# Patient Record
Sex: MALE | Race: WHITE | NOT HISPANIC OR LATINO | Employment: OTHER | URBAN - NONMETROPOLITAN AREA
[De-identification: names, ages, dates, MRNs, and addresses within clinical notes are randomized per-mention and may not be internally consistent; named-entity substitution may affect disease eponyms.]

---

## 2017-01-17 ENCOUNTER — APPOINTMENT (OUTPATIENT)
Dept: LAB | Facility: HOSPITAL | Age: 72
End: 2017-01-17
Attending: SURGERY
Payer: COMMERCIAL

## 2017-01-17 ENCOUNTER — TRANSFERRED RECORDS (OUTPATIENT)
Dept: HEALTH INFORMATION MANAGEMENT | Facility: HOSPITAL | Age: 72
End: 2017-01-17

## 2017-01-17 ENCOUNTER — RESULTS ONLY (OUTPATIENT)
Dept: LAB | Age: 72
End: 2017-01-17

## 2017-01-17 PROCEDURE — 88305 TISSUE EXAM BY PATHOLOGIST: CPT | Mod: TC | Performed by: SURGERY

## 2017-01-19 LAB — COPATH REPORT: NORMAL

## 2017-03-13 ENCOUNTER — AMBULATORY - GICH (OUTPATIENT)
Dept: SCHEDULING | Facility: OTHER | Age: 72
End: 2017-03-13

## 2018-01-16 RX ORDER — LISINOPRIL 20 MG/1
TABLET ORAL
Status: ON HOLD | COMMUNITY
End: 2021-09-12

## 2018-01-16 RX ORDER — TIOTROPIUM BROMIDE 18 UG/1
18 CAPSULE ORAL; RESPIRATORY (INHALATION)
COMMUNITY
End: 2021-08-04

## 2018-01-16 RX ORDER — ASPIRIN 81 MG/1
81 TABLET ORAL DAILY
COMMUNITY

## 2018-01-16 RX ORDER — AMLODIPINE BESYLATE 2.5 MG/1
TABLET ORAL
Status: ON HOLD | COMMUNITY
End: 2021-09-12

## 2018-01-16 RX ORDER — HYDROCODONE BITARTRATE AND ACETAMINOPHEN 5; 325 MG/1; MG/1
1 TABLET ORAL
COMMUNITY
Start: 2014-02-26 | End: 2021-08-04

## 2021-04-27 ENCOUNTER — TRANSFERRED RECORDS (OUTPATIENT)
Dept: HEALTH INFORMATION MANAGEMENT | Facility: CLINIC | Age: 76
End: 2021-04-27

## 2021-07-06 ENCOUNTER — TRANSCRIBE ORDERS (OUTPATIENT)
Dept: OTHER | Age: 76
End: 2021-07-06

## 2021-07-06 DIAGNOSIS — J44.9 COPD (CHRONIC OBSTRUCTIVE PULMONARY DISEASE) (H): Primary | ICD-10-CM

## 2021-07-08 NOTE — TELEPHONE ENCOUNTER
RECORDS STATUS - ALL OTHER DIAGNOSIS      RECORDS RECEIVED FROM: [facility]   DATE RECEIVED:    NOTES STATUS DETAILS   OFFICE NOTE from referring provider Self, notes in CE 4/27/21: Dr. Chon Riojas   OFFICE NOTE from medical oncologist     DISCHARGE SUMMARY from hospital     DISCHARGE REPORT from the ER CE - [facility] 2/9/20   Pulmonary Function Test/Spirometry Received 7/9 4/27/21, 8/22/19, 6/27/19,  6/19/18, 5/31/17, 10/27/15: [facility]   OPERATIVE REPORT CE - [facility] 1/13/16: Colonoscopy   MEDICATION LIST [facility]   CLINICAL TRIAL TREATMENTS TO DATE     LABS     PATHOLOGY REPORTS NA    ANYTHING RELATED TO DIAGNOSIS Epic/CE 6/11/21   GENONOMIC TESTING     TYPE:     IMAGING (NEED IMAGES & REPORT)     XR PACS 1/22/21, 6/22/18, 5/25/18: [facility]   CT SCANS PACS 6/11/21, 6/19/20, 6/12/19, 12/18/18: [facility]   MRI     MAMMO     ULTRASOUND     PET

## 2021-08-04 ENCOUNTER — VIRTUAL VISIT (OUTPATIENT)
Dept: PULMONOLOGY | Facility: CLINIC | Age: 76
End: 2021-08-04
Attending: INTERNAL MEDICINE
Payer: COMMERCIAL

## 2021-08-04 ENCOUNTER — PRE VISIT (OUTPATIENT)
Dept: PULMONOLOGY | Facility: CLINIC | Age: 76
End: 2021-08-04

## 2021-08-04 DIAGNOSIS — J43.2 CENTRILOBULAR EMPHYSEMA (H): Primary | ICD-10-CM

## 2021-08-04 PROCEDURE — 99204 OFFICE O/P NEW MOD 45 MIN: CPT | Mod: 95 | Performed by: INTERNAL MEDICINE

## 2021-08-04 NOTE — LETTER
8/4/2021       RE: Brent Villagomez  [address]  [place] [address]     Dear Colleague,    Thank you for referring your patient, Brent Villagomez, to the [facility] at [facility]. Please see a copy of my visit note below.    Brent is a 76 year old who is being evaluated via a billable video visit.      How would you like to obtain your AVS? MyChart  If the video visit is dropped, the invitation should be resent by: Send to e-mail at: [email or web address]  Will anyone else be joining your video visit? No     Stefania Shelley SEAN        Video-Visit Details    Type of service:  Video Visit    Video Time: 15 minutes    Originating Location (pt. Location): Home    Distant Location (provider location):  [facility]     Platform used for Video Visit: [facility]  Interventional Pulmonary Clinic Virtual Visit Note    August 4, 2021    Chief complaint:  Brent Villagomez is a 76 year old male seen for   Chief Complaint   Patient presents with     Video Visit     New pt- Consideration of endobronchial valve procedure for severe emphysema       Reason for clinic visit / Chief complaint:   Severe COPD    Assessment and Plan:  Severe COPD, evaluation for lung volume reduction.  Patient was sent by his pulmonologist for further evaluation of or his candidacy for bronchoscopic lung volume reduction.  He has had COPD at least for 10 to 15 years.  He has been on Trelegy and nebulizer which he uses recently every 4 hours.  He is also on home oxygen treatment as below.  We discussed bronchoscopic lung volume reduction and need for further testing to determine if he is a good candidate or not.  We will 6-minute walk test, comprehensive PFTs and Olympus selected CT chest.  He is in agreement with the plan.  I will see him again after these tests are done to discuss the results.  Former smoker  Hypertension on  medications      Billing: Based on Medical Decision Making (Complexity):  L4    History of Present Illness:  76 years old gentleman with known severe COPD has been on inhalers/nebulizer with limited physical capacity.  He has had PFTs done in April 2021 revealing FEV1 of 1.2 L.  His main symptom is dyspnea on exertion.  He appears to be having exacerbations averaging once a year but he has never been in the hospital or emergency room because of COPD in the past.     HOT 2 lpm/NC sitting, 4 lpm on exertion  trelogy inhaler  Nebs--q4 hours  Prednisone use-- once a year  No ED visit or hospitalization  Smoker 50 pack year, quit 5 years ago    Evaluation Checklist for Bronchoscopic Lung Volume Reduction (Spiration Valves):    Before Referring Your Patient to Interventional Pulmonology:  []Have the studies listed below been completed?  []My patient meets the below mentioned inclusion criteria  []My patient does not meet any exclusion criteria     Checklist of Studies to Complete Within 12 weeks of Initial Evaluation:  []CT scan of the chest (ok if within 6 months)  []Transthoracic echocardiogram   []Arterial blood gas on room air   []6 minute walk test   []Spirometry, lung volumes, and diffusing capacity     Inclusion Criteria Checklist:  []Heterogeneous, upper lobe predominant COPD    []Completion of a comprehensive pulmonary rehabilitation program or able to ambulate >140 meters (459 feet) on 6MWT   []FEV1 ?45% of predicted value  []TLC ?100% of predicted value  []RV ?150% of predicted value  []PaCO2 ?50 mmHg  []PaO2 ?45 mmHg (on room air)  []Willing to undergo bronchoscopy and follow-up  []No co-existing medical problem that would preclude bronchoscopy  []If female with childbearing potential, negative pregnancy test result    Exclusion Criteria Checklist:  []Homogeneous emphysema as seen on CT scan of the chest  []FEV1 ?20% of predicted value with DLCO ?20%  []Active asthma, chronic bronchitis, or clinically  "significant bronchiectasis  []Smoking within the last 4 months  []History of recurrent infections with clinically significant sputum production  []Giant bulla (greater than 1/3 volume of lung)  []Pulmonary arterial hypertension (peak systolic pulmonary artery pressure >45 mmHg)  []Requires >6 L/min O2 to keep SpO2 ?89% at rest  []Significant comorbidity      Hernán Jerome, et al. \"A multicenter trial of an intrabronchial valve for treatment of severe emphysema.\" The Journal of thoracic and cardiovascular surgery 133.1 (2007): 65-73      Allergies   Allergen Reactions     No Clinical Screening - See Comments      Dust and pollen        Past Medical History:   Diagnosis Date     Chronic hepatitis C (H)     No Comments Provided     Disorder of porphyrin metabolism (H)     No Comments Provided     Lumbar sprain     No Comments Provided     Other and unspecified alcohol dependence, unspecified drinking behavior     No Comments Provided     Other specified chronic obstructive airways disease     No Comments Provided     Personal history of other specified diseases     occupational, hammering     Polycythemia, secondary     No Comments Provided     Unspecified visual loss     near and far        Past Surgical History:   Procedure Laterality Date     CLOSED REDUCTION ANKLE      Fx ankle with plate        Social History     Socioeconomic History     Marital status: Single     Spouse name: Not on file     Number of children: Not on file     Years of education: Not on file     Highest education level: Not on file   Occupational History     Not on file   Tobacco Use     Smoking status: Current Every Day Smoker     Packs/day: 1.00     Types: Cigarettes     Smokeless tobacco: Never Used   Substance and Sexual Activity     Alcohol use: No     Drug use: Unknown     Types: Other     Comment: Drug use: No     Sexual activity: Not on file   Other Topics Concern     Not on file   Social History Narrative    Living on [address] [facility] [address] [place].  Lives by self, seperated, 2 children grown.  Disabled from COPD, Hepatitis C.  p 7/19/2013.     Social Determinants of Health     Financial Resource Strain:      Difficulty of Paying Living Expenses:    Food Insecurity:      Worried About Running Out of Food in the Last Year:      Ran Out of Food in the Last Year:    Transportation Needs:      Lack of Transportation (Medical):      Lack of Transportation (Non-Medical):    Physical Activity:      Days of Exercise per Week:      Minutes of Exercise per Session:    Stress:      Feeling of Stress :    Social Connections:      Frequency of Communication with Friends and Family:      Frequency of Social Gatherings with Friends and Family:      Attends Tenri[employer] Services:      Active Member of Clubs or Organizations:      Attends Club or Organization Meetings:      Marital Status:    Intimate Partner Violence:      Fear of Current or Ex-Partner:      Emotionally Abused:      Physically Abused:      Sexually Abused:         Family History   Problem Relation Age of Onset     Heart Disease Mother         Heart Disease,valve disease/rheumatic     Other - See Comments Father         sudden death age 70     Arthritis Father         Arthritis     Cancer Brother         Cancer,Skin cancer        Immunization History   Administered Date(s) Administered     COVID-19,PF,Pfizer 02/24/2021, 03/24/2021       Current Outpatient Medications   Medication Sig     amLODIPine (NORVASC) 2.5 MG tablet      aspirin EC 81 MG EC tablet Take 81 mg by mouth     lisinopril (PRINIVIL/ZESTRIL) 20 MG tablet      No current facility-administered medications for this visit.        Review of Systems:  I have done 10 points of review systems and all negative except for those mentioned in HPI    Physical examination  Constitutional: Oriented, not in distress  Vitals: unavailable  Eyes: No icterus, nystagmus, pupils isocoric  Head and neck: normal posture and movements  Respiratory:  Normal tidal breathing, no shortness of breath, no audible wheezing or stridor over the phone or video visit  Musculoskeletal: Normal muscle mass, no deformity on hands/fingers  Integumentary:  No rash on visible skin areas on video visit  Neurological: Alert, orientedx3, no motor deficits  Psychiatric:  Mood and affect are appropriate with insight into his/her medical condition    Data:      LARISA Cole MD      Again, thank you for allowing me to participate in the care of your patient.      Sincerely,    Fausto Cole MD

## 2021-08-04 NOTE — LETTER
8/4/2021      RE: Brent Villagomez  [address] [address]         [facility]  Interventional Pulmonary Clinic Virtual Visit Note    August 4, 2021    Chief complaint:  Brent Villagomez is a 76 year old male seen for   Chief Complaint   Patient presents with     Video Visit     New pt- Consideration of endobronchial valve procedure for severe emphysema       Reason for clinic visit / Chief complaint:   Severe COPD    Assessment and Plan:  Severe COPD, evaluation for lung volume reduction.  Patient was sent by his pulmonologist for further evaluation of or his candidacy for bronchoscopic lung volume reduction.  He has had COPD at least for 10 to 15 years.  He has been on Trelegy and nebulizer which he uses recently every 4 hours.  He is also on home oxygen treatment as below.  We discussed bronchoscopic lung volume reduction and need for further testing to determine if he is a good candidate or not.  We will 6-minute walk test, comprehensive PFTs and Olympus selected CT chest.  He is in agreement with the plan.  I will see him again after these tests are done to discuss the results.  Former smoker  Hypertension on medications      Billing: Based on Medical Decision Making (Complexity):  L4    History of Present Illness:  76 years old gentleman with known severe COPD has been on inhalers/nebulizer with limited physical capacity.  He has had PFTs done in April 2021 revealing FEV1 of 1.2 L.  His main symptom is dyspnea on exertion.  He appears to be having exacerbations averaging once a year but he has never been in the hospital or emergency room because of COPD in the past.     HOT 2 lpm/NC sitting, 4 lpm on exertion  trelogy inhaler  Nebs--q4 hours  Prednisone use-- once a year  No ED visit or hospitalization  Smoker 50 pack year, quit 5 years ago    Evaluation Checklist for Bronchoscopic Lung Volume Reduction (Spiration Valves):    Before Referring Your Patient to Interventional Pulmonology:  []Have  "the studies listed below been completed?  []My patient meets the below mentioned inclusion criteria  []My patient does not meet any exclusion criteria     Checklist of Studies to Complete Within 12 weeks of Initial Evaluation:  []CT scan of the chest (ok if within 6 months)  []Transthoracic echocardiogram   []Arterial blood gas on room air   []6 minute walk test   []Spirometry, lung volumes, and diffusing capacity     Inclusion Criteria Checklist:  []Heterogeneous, upper lobe predominant COPD    []Completion of a comprehensive pulmonary rehabilitation program or able to ambulate >140 meters (459 feet) on 6MWT   []FEV1 ?45% of predicted value  []TLC ?100% of predicted value  []RV ?150% of predicted value  []PaCO2 ?50 mmHg  []PaO2 ?45 mmHg (on room air)  []Willing to undergo bronchoscopy and follow-up  []No co-existing medical problem that would preclude bronchoscopy  []If female with childbearing potential, negative pregnancy test result    Exclusion Criteria Checklist:  []Homogeneous emphysema as seen on CT scan of the chest  []FEV1 ?20% of predicted value with DLCO ?20%  []Active asthma, chronic bronchitis, or clinically significant bronchiectasis  []Smoking within the last 4 months  []History of recurrent infections with clinically significant sputum production  []Giant bulla (greater than 1/3 volume of lung)  []Pulmonary arterial hypertension (peak systolic pulmonary artery pressure >45 mmHg)  []Requires >6 L/min O2 to keep SpO2 ?89% at rest  []Significant comorbidity      Hernán Jerome, et al. \"A multicenter trial of an intrabronchial valve for treatment of severe emphysema.\" The Journal of thoracic and cardiovascular surgery 133.1 (2007): 65-73      Allergies   Allergen Reactions     No Clinical Screening - See Comments      Dust and pollen        Past Medical History:   Diagnosis Date     Chronic hepatitis C (H)     No Comments Provided     Disorder of porphyrin metabolism (H)     No Comments Provided     " Lumbar sprain     No Comments Provided     Other and unspecified alcohol dependence, unspecified drinking behavior     No Comments Provided     Other specified chronic obstructive airways disease     No Comments Provided     Personal history of other specified diseases     occupational, hammering     Polycythemia, secondary     No Comments Provided     Unspecified visual loss     near and far        Past Surgical History:   Procedure Laterality Date     CLOSED REDUCTION ANKLE      Fx ankle with plate        Social History     Socioeconomic History     Marital status: Single     Spouse name: Not on file     Number of children: Not on file     Years of education: Not on file     Highest education level: Not on file   Occupational History     Not on file   Tobacco Use     Smoking status: Current Every Day Smoker     Packs/day: 1.00     Types: Cigarettes     Smokeless tobacco: Never Used   Substance and Sexual Activity     Alcohol use: No     Drug use: Unknown     Types: Other     Comment: Drug use: No     Sexual activity: Not on file   Other Topics Concern     Not on file   Social History Narrative    Living on [address].  Lives by self, seperated, 2 children grown.  Disabled from COPD, Hepatitis C.  p 7/19/2013.     Social Determinants of Health     Financial Resource Strain:      Difficulty of Paying Living Expenses:    Food Insecurity:      Worried About Running Out of Food in the Last Year:      Ran Out of Food in the Last Year:    Transportation Needs:      Lack of Transportation (Medical):      Lack of Transportation (Non-Medical):    Physical Activity:      Days of Exercise per Week:      Minutes of Exercise per Session:    Stress:      Feeling of Stress :    Social Connections:      Frequency of Communication with Friends and Family:      Frequency of Social Gatherings with Friends and Family:      Attends [employer]ism Services:      Active Member of Clubs or Organizations:      Attends  Club or Organization Meetings:      Marital Status:    Intimate Partner Violence:      Fear of Current or Ex-Partner:      Emotionally Abused:      Physically Abused:      Sexually Abused:         Family History   Problem Relation Age of Onset     Heart Disease Mother         Heart Disease,valve disease/rheumatic     Other - See Comments Father         sudden death age 70     Arthritis Father         Arthritis     Cancer Brother         Cancer,Skin cancer        Immunization History   Administered Date(s) Administered     COVID-19,PF,Pfizer 02/24/2021, 03/24/2021       Current Outpatient Medications   Medication Sig     amLODIPine (NORVASC) 2.5 MG tablet      aspirin EC 81 MG EC tablet Take 81 mg by mouth     lisinopril (PRINIVIL/ZESTRIL) 20 MG tablet      No current facility-administered medications for this visit.        Review of Systems:  I have done 10 points of review systems and all negative except for those mentioned in HPI    Physical examination  Constitutional: Oriented, not in distress  Vitals: unavailable  Eyes: No icterus, nystagmus, pupils isocoric  Head and neck: normal posture and movements  Respiratory: Normal tidal breathing, no shortness of breath, no audible wheezing or stridor over the phone or video visit  Musculoskeletal: Normal muscle mass, no deformity on hands/fingers  Integumentary:  No rash on visible skin areas on video visit  Neurological: Alert, orientedx3, no motor deficits  Psychiatric:  Mood and affect are appropriate with insight into his/her medical condition    Data:      LARISA Cole MD

## 2021-08-04 NOTE — PROGRESS NOTES
Brent is a 76 year old who is being evaluated via a billable video visit.      How would you like to obtain your AVS? MyChart  If the video visit is dropped, the invitation should be resent by: Send to e-mail at: [email or web address]  Will anyone else be joining your video visit? Jazmyne     Stefania BOLDEN        Video-Visit Details    Type of service:  Video Visit    Video Time: 15 minutes    Originating Location (pt. Location): Home    Distant Location (provider location):  [facility]     Platform used for Video Visit: [facility]  Interventional Pulmonary Clinic Virtual Visit Note    August 4, 2021    Chief complaint:  Brent Villagomez is a 76 year old male seen for   Chief Complaint   Patient presents with     Video Visit     New pt- Consideration of endobronchial valve procedure for severe emphysema       Reason for clinic visit / Chief complaint:   Severe COPD    Assessment and Plan:  Severe COPD, evaluation for lung volume reduction.  Patient was sent by his pulmonologist for further evaluation of or his candidacy for bronchoscopic lung volume reduction.  He has had COPD at least for 10 to 15 years.  He has been on Trelegy and nebulizer which he uses recently every 4 hours.  He is also on home oxygen treatment as below.  We discussed bronchoscopic lung volume reduction and need for further testing to determine if he is a good candidate or not.  We will 6-minute walk test, comprehensive PFTs and Olymp[facility] selected CT chest.  He is in agreement with the plan.  I will see him again after these tests are done to discuss the results.  Former smoker  Hypertension on medications      Billing: Based on Medical Decision Making (Complexity):  L4    History of Present Illness:  76 years old gentleman with known severe COPD has been on inhalers/nebulizer with limited physical capacity.  He has had PFTs done in April 2021 revealing FEV1 of 1.2 L.  His main symptom is dyspnea on  exertion.  He appears to be having exacerbations averaging once a year but he has never been in the hospital or emergency room because of COPD in the past.     HOT 2 lpm/NC sitting, 4 lpm on exertion  trelogy inhaler  Nebs--q4 hours  Prednisone use-- once a year  No ED visit or hospitalization  Smoker 50 pack year, quit 5 years ago    Evaluation Checklist for Bronchoscopic Lung Volume Reduction (Spiration Valves):    Before Referring Your Patient to Interventional Pulmonology:  []Have the studies listed below been completed?  []My patient meets the below mentioned inclusion criteria  []My patient does not meet any exclusion criteria     Checklist of Studies to Complete Within 12 weeks of Initial Evaluation:  []CT scan of the chest (ok if within 6 months)  []Transthoracic echocardiogram   []Arterial blood gas on room air   []6 minute walk test   []Spirometry, lung volumes, and diffusing capacity     Inclusion Criteria Checklist:  []Heterogeneous, upper lobe predominant COPD    []Completion of a comprehensive pulmonary rehabilitation program or able to ambulate >140 meters (459 feet) on 6MWT   []FEV1 ?45% of predicted value  []TLC ?100% of predicted value  []RV ?150% of predicted value  []PaCO2 ?50 mmHg  []PaO2 ?45 mmHg (on room air)  []Willing to undergo bronchoscopy and follow-up  []No co-existing medical problem that would preclude bronchoscopy  []If female with childbearing potential, negative pregnancy test result    Exclusion Criteria Checklist:  []Homogeneous emphysema as seen on CT scan of the chest  []FEV1 ?20% of predicted value with DLCO ?20%  []Active asthma, chronic bronchitis, or clinically significant bronchiectasis  []Smoking within the last 4 months  []History of recurrent infections with clinically significant sputum production  []Giant bulla (greater than 1/3 volume of lung)  []Pulmonary arterial hypertension (peak systolic pulmonary artery pressure >45 mmHg)  []Requires >6 L/min O2 to keep SpO2  "?89% at rest  []Significant comorbidity      Hernán Jerome, et al. \"A multicenter trial of an intrabronchial valve for treatment of severe emphysema.\" The Journal of thoracic and cardiovascular surgery 133.1 (2007): 65-73      Allergies   Allergen Reactions     No Clinical Screening - See Comments      Dust and pollen        Past Medical History:   Diagnosis Date     Chronic hepatitis C (H)     No Comments Provided     Disorder of porphyrin metabolism (H)     No Comments Provided     Lumbar sprain     No Comments Provided     Other and unspecified alcohol dependence, unspecified drinking behavior     No Comments Provided     Other specified chronic obstructive airways disease     No Comments Provided     Personal history of other specified diseases     occupational, hammering     Polycythemia, secondary     No Comments Provided     Unspecified visual loss     near and far        Past Surgical History:   Procedure Laterality Date     CLOSED REDUCTION ANKLE      Fx ankle with plate        Social History     Socioeconomic History     Marital status: Single     Spouse name: Not on file     Number of children: Not on file     Years of education: Not on file     Highest education level: Not on file   Occupational History     Not on file   Tobacco Use     Smoking status: Current Every Day Smoker     Packs/day: 1.00     Types: Cigarettes     Smokeless tobacco: Never Used   Substance and Sexual Activity     Alcohol use: No     Drug use: Unknown     Types: Other     Comment: Drug use: No     Sexual activity: Not on file   Other Topics Concern     Not on file   Social History Narrative    Living [place] [facility] [place] [facility].  Lives by self, seperated, 2 children grown.  Disabled from COPD, Hepatitis C.  p 7/19/2013.     Social Determinants of Health     Financial Resource Strain:      Difficulty of Paying Living Expenses:    Food Insecurity:      Worried About Running Out of Food in the Last Year:      Ran Out " of Food in the Last Year:    Transportation Needs:      Lack of Transportation (Medical):      Lack of Transportation (Non-Medical):    Physical Activity:      Days of Exercise per Week:      Minutes of Exercise per Session:    Stress:      Feeling of Stress :    Social Connections:      Frequency of Communication with Friends and Family:      Frequency of Social Gatherings with Friends and Family:      Attends [employer] Services:      Active Member of Clubs or Organizations:      Attends Club or Organization Meetings:      Marital Status:    Intimate Partner Violence:      Fear of Current or Ex-Partner:      Emotionally Abused:      Physically Abused:      Sexually Abused:         Family History   Problem Relation Age of Onset     Heart Disease Mother         Heart Disease,valve disease/rheumatic     Other - See Comments Father         sudden death age 70     Arthritis Father         Arthritis     Cancer Brother         Cancer,Skin cancer        Immunization History   Administered Date(s) Administered     COVID-19,PF,Pfizer 02/24/2021, 03/24/2021       Current Outpatient Medications   Medication Sig     amLODIPine (NORVASC) 2.5 MG tablet      aspirin EC 81 MG EC tablet Take 81 mg by mouth     lisinopril (PRINIVIL/ZESTRIL) 20 MG tablet      No current facility-administered medications for this visit.        Review of Systems:  I have done 10 points of review systems and all negative except for those mentioned in HPI    Physical examination  Constitutional: Oriented, not in distress  Vitals: unavailable  Eyes: No icterus, nystagmus, pupils isocoric  Head and neck: normal posture and movements  Respiratory: Normal tidal breathing, no shortness of breath, no audible wheezing or stridor over the phone or video visit  Musculoskeletal: Normal muscle mass, no deformity on hands/fingers  Integumentary:  No rash on visible skin areas on video visit  Neurological: Alert, orientedx3, no motor deficits  Psychiatric:  Mood and  affect are appropriate with insight into his/her medical condition    Data:      LARISA Cole MD

## 2021-08-07 ENCOUNTER — HEALTH MAINTENANCE LETTER (OUTPATIENT)
Age: 76
End: 2021-08-07

## 2021-08-10 ENCOUNTER — TELEPHONE (OUTPATIENT)
Dept: ONCOLOGY | Facility: CLINIC | Age: 76
End: 2021-08-10

## 2021-08-11 ENCOUNTER — TELEPHONE (OUTPATIENT)
Dept: ONCOLOGY | Facility: CLINIC | Age: 76
End: 2021-08-11

## 2021-08-11 ENCOUNTER — TELEPHONE (OUTPATIENT)
Dept: PULMONOLOGY | Facility: CLINIC | Age: 76
End: 2021-08-11

## 2021-08-11 NOTE — TELEPHONE ENCOUNTER
[facility] Call Center    Phone Message    May a detailed message be left on voicemail: yes     Reason for Call: Other: Geri calling from [facility] because pt is trying to have a PFT and 6 minute walk done there, but they need the orders. Please fax orders for PFT and 6 minute walk to [phone]. Please call back if any questions.     Action Taken: Message routed to:  [facility] ([facility]): pulm    Travel Screening: Not Applicable

## 2021-08-11 NOTE — TELEPHONE ENCOUNTER
Faxed the PFT order, patient demographic information, and 6 minute walk order to the below fax number. Confirmation received.

## 2021-08-13 ENCOUNTER — TELEPHONE (OUTPATIENT)
Dept: ONCOLOGY | Facility: CLINIC | Age: 76
End: 2021-08-13

## 2021-08-17 ENCOUNTER — TRANSFERRED RECORDS (OUTPATIENT)
Dept: HEALTH INFORMATION MANAGEMENT | Facility: CLINIC | Age: 76
End: 2021-08-17

## 2021-08-19 ENCOUNTER — ANCILLARY PROCEDURE (OUTPATIENT)
Dept: CT IMAGING | Facility: CLINIC | Age: 76
End: 2021-08-19
Attending: INTERNAL MEDICINE
Payer: COMMERCIAL

## 2021-08-19 DIAGNOSIS — J43.2 CENTRILOBULAR EMPHYSEMA (H): ICD-10-CM

## 2021-08-19 PROCEDURE — 71250 CT THORAX DX C-: CPT | Mod: GC | Performed by: RADIOLOGY

## 2021-08-25 NOTE — TELEPHONE ENCOUNTER
I have faxed the the orders for PFT and 6 min walk to Michelle @ [phone]    Suzanne Bernard MA      
[phone]

## 2021-09-11 ENCOUNTER — APPOINTMENT (OUTPATIENT)
Dept: GENERAL RADIOLOGY | Facility: OTHER | Age: 76
DRG: 193 | End: 2021-09-11
Attending: PHYSICIAN ASSISTANT
Payer: COMMERCIAL

## 2021-09-11 ENCOUNTER — HOSPITAL ENCOUNTER (INPATIENT)
Facility: OTHER | Age: 76
LOS: 5 days | Discharge: HOME | DRG: 193 | End: 2021-09-16
Attending: PHYSICIAN ASSISTANT | Admitting: FAMILY MEDICINE
Payer: COMMERCIAL

## 2021-09-11 ENCOUNTER — APPOINTMENT (OUTPATIENT)
Dept: CT IMAGING | Facility: OTHER | Age: 76
DRG: 193 | End: 2021-09-11
Attending: PHYSICIAN ASSISTANT
Payer: COMMERCIAL

## 2021-09-11 DIAGNOSIS — Z79.51 LONG TERM CURRENT USE OF INHALED STEROID: ICD-10-CM

## 2021-09-11 DIAGNOSIS — J96.21 ACUTE ON CHRONIC RESPIRATORY FAILURE WITH HYPOXIA (H): Primary | ICD-10-CM

## 2021-09-11 DIAGNOSIS — J44.1 OBSTRUCTIVE CHRONIC BRONCHITIS WITH EXACERBATION (H): ICD-10-CM

## 2021-09-11 DIAGNOSIS — Z79.82 ENCOUNTER FOR LONG-TERM (CURRENT) USE OF ASPIRIN: ICD-10-CM

## 2021-09-11 DIAGNOSIS — R09.02 HYPOXEMIA: ICD-10-CM

## 2021-09-11 DIAGNOSIS — B18.2 CHRONIC HEPATITIS C WITH HEPATIC COMA (H): ICD-10-CM

## 2021-09-11 DIAGNOSIS — F17.210 CIGARETTE SMOKER: ICD-10-CM

## 2021-09-11 DIAGNOSIS — J44.1 COPD EXACERBATION (H): ICD-10-CM

## 2021-09-11 DIAGNOSIS — R09.02 HYPOXIA: ICD-10-CM

## 2021-09-11 DIAGNOSIS — Z11.52 ENCOUNTER FOR SCREENING LABORATORY TESTING FOR COVID-19 VIRUS: ICD-10-CM

## 2021-09-11 DIAGNOSIS — R06.82 TACHYPNEA: ICD-10-CM

## 2021-09-11 DIAGNOSIS — Z79.899 ENCOUNTER FOR LONG-TERM (CURRENT) USE OF MEDICATIONS: ICD-10-CM

## 2021-09-11 DIAGNOSIS — Z79.899 NEED FOR PROPHYLACTIC CHEMOTHERAPY: ICD-10-CM

## 2021-09-11 DIAGNOSIS — J18.9 PNEUMONIA OF RIGHT LOWER LOBE DUE TO INFECTIOUS ORGANISM: ICD-10-CM

## 2021-09-11 LAB
ALBUMIN SERPL-MCNC: 4.1 G/DL (ref 3.5–5.7)
ALBUMIN UR-MCNC: NEGATIVE MG/DL
ALP SERPL-CCNC: 63 U/L (ref 34–104)
ALT SERPL W P-5'-P-CCNC: 28 U/L (ref 7–52)
ANION GAP SERPL CALCULATED.3IONS-SCNC: 8 MMOL/L (ref 3–14)
APPEARANCE UR: CLEAR
AST SERPL W P-5'-P-CCNC: 26 U/L (ref 13–39)
BASE EXCESS BLDA CALC-SCNC: 0.9 MMOL/L (ref -9–1.8)
BASOPHILS # BLD AUTO: 0.1 10E3/UL (ref 0–0.2)
BASOPHILS NFR BLD AUTO: 1 %
BILIRUB SERPL-MCNC: 0.6 MG/DL (ref 0.3–1)
BILIRUB UR QL STRIP: NEGATIVE
BUN SERPL-MCNC: 21 MG/DL (ref 7–25)
CALCIUM SERPL-MCNC: 9.5 MG/DL (ref 8.6–10.3)
CHLORIDE BLD-SCNC: 104 MMOL/L (ref 98–107)
CO2 SERPL-SCNC: 27 MMOL/L (ref 21–31)
COLOR UR AUTO: ABNORMAL
CREAT SERPL-MCNC: 1.01 MG/DL (ref 0.7–1.3)
CRP SERPL-MCNC: 43.6 MG/L
D DIMER PPP FEU-MCNC: 1.4 UG/ML FEU (ref 0–0.5)
EOSINOPHIL # BLD AUTO: 0.4 10E3/UL (ref 0–0.7)
EOSINOPHIL NFR BLD AUTO: 3 %
ERYTHROCYTE [DISTWIDTH] IN BLOOD BY AUTOMATED COUNT: 12.7 % (ref 10–15)
GFR SERPL CREATININE-BSD FRML MDRD: 73 ML/MIN/1.73M2
GLUCOSE BLD-MCNC: 99 MG/DL (ref 70–105)
GLUCOSE UR STRIP-MCNC: NEGATIVE MG/DL
HCO3 BLD-SCNC: 25 MMOL/L (ref 21–28)
HCT VFR BLD AUTO: 45.4 % (ref 40–53)
HGB BLD-MCNC: 16.2 G/DL (ref 13.3–17.7)
HGB UR QL STRIP: NEGATIVE
IMM GRANULOCYTES # BLD: 0 10E3/UL
IMM GRANULOCYTES NFR BLD: 0 %
KETONES UR STRIP-MCNC: NEGATIVE MG/DL
LACTATE SERPL-SCNC: 1.2 MMOL/L (ref 0.7–2)
LEUKOCYTE ESTERASE UR QL STRIP: NEGATIVE
LYMPHOCYTES # BLD AUTO: 1.3 10E3/UL (ref 0.8–5.3)
LYMPHOCYTES NFR BLD AUTO: 11 %
MAGNESIUM SERPL-MCNC: 1.8 MG/DL (ref 1.9–2.7)
MCH RBC QN AUTO: 35.1 PG (ref 26.5–33)
MCHC RBC AUTO-ENTMCNC: 35.7 G/DL (ref 31.5–36.5)
MCV RBC AUTO: 99 FL (ref 78–100)
MONOCYTES # BLD AUTO: 1.2 10E3/UL (ref 0–1.3)
MONOCYTES NFR BLD AUTO: 11 %
MUCOUS THREADS #/AREA URNS LPF: PRESENT /LPF
NEUTROPHILS # BLD AUTO: 8.1 10E3/UL (ref 1.6–8.3)
NEUTROPHILS NFR BLD AUTO: 74 %
NITRATE UR QL: NEGATIVE
NRBC # BLD AUTO: 0 10E3/UL
NRBC BLD AUTO-RTO: 0 /100
O2/TOTAL GAS SETTING VFR VENT: 2 %
OXYHGB MFR BLD: 91 % (ref 92–100)
PCO2 BLD: 38 MM HG (ref 35–45)
PH BLD: 7.43 [PH] (ref 7.35–7.45)
PH UR STRIP: 6 [PH] (ref 5–9)
PLATELET # BLD AUTO: 210 10E3/UL (ref 150–450)
PO2 BLD: 57 MM HG (ref 80–105)
POTASSIUM BLD-SCNC: 4.2 MMOL/L (ref 3.5–5.1)
PROT SERPL-MCNC: 6.8 G/DL (ref 6.4–8.9)
RBC # BLD AUTO: 4.61 10E6/UL (ref 4.4–5.9)
RBC URINE: <1 /HPF
SARS-COV-2 RNA RESP QL NAA+PROBE: NEGATIVE
SODIUM SERPL-SCNC: 139 MMOL/L (ref 134–144)
SP GR UR STRIP: 1.03 (ref 1–1.03)
TROPONIN I SERPL-MCNC: 9.9 PG/ML (ref 0–34)
UROBILINOGEN UR STRIP-MCNC: NORMAL MG/DL
WBC # BLD AUTO: 10.9 10E3/UL (ref 4–11)
WBC URINE: 0 /HPF

## 2021-09-11 PROCEDURE — U0003 INFECTIOUS AGENT DETECTION BY NUCLEIC ACID (DNA OR RNA); SEVERE ACUTE RESPIRATORY SYNDROME CORONAVIRUS 2 (SARS-COV-2) (CORONAVIRUS DISEASE [COVID-19]), AMPLIFIED PROBE TECHNIQUE, MAKING USE OF HIGH THROUGHPUT TECHNOLOGIES AS DESCRIBED BY CMS-2020-01-R: HCPCS | Performed by: PHYSICIAN ASSISTANT

## 2021-09-11 PROCEDURE — 99285 EMERGENCY DEPT VISIT HI MDM: CPT | Mod: 25 | Performed by: PHYSICIAN ASSISTANT

## 2021-09-11 PROCEDURE — 93005 ELECTROCARDIOGRAM TRACING: CPT | Performed by: PHYSICIAN ASSISTANT

## 2021-09-11 PROCEDURE — 71045 X-RAY EXAM CHEST 1 VIEW: CPT

## 2021-09-11 PROCEDURE — 96375 TX/PRO/DX INJ NEW DRUG ADDON: CPT | Mod: XU | Performed by: PHYSICIAN ASSISTANT

## 2021-09-11 PROCEDURE — 120N000001 HC R&B MED SURG/OB

## 2021-09-11 PROCEDURE — 71275 CT ANGIOGRAPHY CHEST: CPT

## 2021-09-11 PROCEDURE — C9803 HOPD COVID-19 SPEC COLLECT: HCPCS | Performed by: PHYSICIAN ASSISTANT

## 2021-09-11 PROCEDURE — 83605 ASSAY OF LACTIC ACID: CPT | Performed by: PHYSICIAN ASSISTANT

## 2021-09-11 PROCEDURE — 258N000003 HC RX IP 258 OP 636: Performed by: PHYSICIAN ASSISTANT

## 2021-09-11 PROCEDURE — 999N000105 HC STATISTIC NO DOCUMENTATION TO SUPPORT CHARGE

## 2021-09-11 PROCEDURE — 250N000009 HC RX 250: Performed by: PHYSICIAN ASSISTANT

## 2021-09-11 PROCEDURE — 87040 BLOOD CULTURE FOR BACTERIA: CPT | Performed by: PHYSICIAN ASSISTANT

## 2021-09-11 PROCEDURE — 94640 AIRWAY INHALATION TREATMENT: CPT

## 2021-09-11 PROCEDURE — 96366 THER/PROPH/DIAG IV INF ADDON: CPT | Performed by: PHYSICIAN ASSISTANT

## 2021-09-11 PROCEDURE — 99285 EMERGENCY DEPT VISIT HI MDM: CPT | Performed by: PHYSICIAN ASSISTANT

## 2021-09-11 PROCEDURE — 83735 ASSAY OF MAGNESIUM: CPT | Performed by: PHYSICIAN ASSISTANT

## 2021-09-11 PROCEDURE — 36415 COLL VENOUS BLD VENIPUNCTURE: CPT | Performed by: PHYSICIAN ASSISTANT

## 2021-09-11 PROCEDURE — 85025 COMPLETE CBC W/AUTO DIFF WBC: CPT | Performed by: PHYSICIAN ASSISTANT

## 2021-09-11 PROCEDURE — 96365 THER/PROPH/DIAG IV INF INIT: CPT | Mod: XU | Performed by: PHYSICIAN ASSISTANT

## 2021-09-11 PROCEDURE — 81001 URINALYSIS AUTO W/SCOPE: CPT | Performed by: PHYSICIAN ASSISTANT

## 2021-09-11 PROCEDURE — 250N000011 HC RX IP 250 OP 636: Performed by: PHYSICIAN ASSISTANT

## 2021-09-11 PROCEDURE — 93010 ELECTROCARDIOGRAM REPORT: CPT | Performed by: INTERNAL MEDICINE

## 2021-09-11 PROCEDURE — 85379 FIBRIN DEGRADATION QUANT: CPT | Performed by: PHYSICIAN ASSISTANT

## 2021-09-11 PROCEDURE — 86140 C-REACTIVE PROTEIN: CPT | Performed by: PHYSICIAN ASSISTANT

## 2021-09-11 PROCEDURE — 36600 WITHDRAWAL OF ARTERIAL BLOOD: CPT | Performed by: PHYSICIAN ASSISTANT

## 2021-09-11 PROCEDURE — 80053 COMPREHEN METABOLIC PANEL: CPT | Performed by: PHYSICIAN ASSISTANT

## 2021-09-11 PROCEDURE — 82805 BLOOD GASES W/O2 SATURATION: CPT | Performed by: PHYSICIAN ASSISTANT

## 2021-09-11 PROCEDURE — 94664 DEMO&/EVAL PT USE INHALER: CPT

## 2021-09-11 PROCEDURE — 84484 ASSAY OF TROPONIN QUANT: CPT | Performed by: PHYSICIAN ASSISTANT

## 2021-09-11 PROCEDURE — 999N000157 HC STATISTIC RCP TIME EA 10 MIN

## 2021-09-11 RX ORDER — AZITHROMYCIN 500 MG/5ML
500 INJECTION, POWDER, LYOPHILIZED, FOR SOLUTION INTRAVENOUS
Status: DISCONTINUED | OUTPATIENT
Start: 2021-09-12 | End: 2021-09-12

## 2021-09-11 RX ORDER — ALBUTEROL SULFATE 0.83 MG/ML
2.5 SOLUTION RESPIRATORY (INHALATION)
Status: DISCONTINUED | OUTPATIENT
Start: 2021-09-11 | End: 2021-09-12

## 2021-09-11 RX ORDER — MONTELUKAST SODIUM 10 MG/1
10 TABLET ORAL AT BEDTIME
Status: ON HOLD | COMMUNITY
Start: 2021-08-17 | End: 2023-04-15

## 2021-09-11 RX ORDER — CEFTRIAXONE SODIUM 1 G/50ML
1 INJECTION, SOLUTION INTRAVENOUS EVERY 24 HOURS
Status: DISCONTINUED | OUTPATIENT
Start: 2021-09-12 | End: 2021-09-14

## 2021-09-11 RX ORDER — AMLODIPINE BESYLATE 5 MG/1
2.5 TABLET ORAL DAILY
COMMUNITY
Start: 2021-05-24

## 2021-09-11 RX ORDER — IPRATROPIUM BROMIDE AND ALBUTEROL SULFATE 2.5; .5 MG/3ML; MG/3ML
3 SOLUTION RESPIRATORY (INHALATION)
Status: DISCONTINUED | OUTPATIENT
Start: 2021-09-11 | End: 2021-09-16 | Stop reason: HOSPADM

## 2021-09-11 RX ORDER — BUPROPION HYDROCHLORIDE 150 MG/1
TABLET, EXTENDED RELEASE ORAL
COMMUNITY
Start: 2021-05-27 | End: 2021-09-11

## 2021-09-11 RX ORDER — ALBUTEROL SULFATE 90 UG/1
2 AEROSOL, METERED RESPIRATORY (INHALATION) EVERY 6 HOURS PRN
COMMUNITY
Start: 2021-04-27 | End: 2021-09-23

## 2021-09-11 RX ORDER — SODIUM CHLORIDE 9 MG/ML
INJECTION, SOLUTION INTRAVENOUS CONTINUOUS
Status: DISCONTINUED | OUTPATIENT
Start: 2021-09-11 | End: 2021-09-16 | Stop reason: HOSPADM

## 2021-09-11 RX ORDER — ALBUTEROL SULFATE 0.83 MG/ML
SOLUTION RESPIRATORY (INHALATION)
Status: ON HOLD | COMMUNITY
Start: 2021-08-26 | End: 2021-09-12

## 2021-09-11 RX ORDER — NEBULIZER ACCESSORIES
EACH MISCELLANEOUS
COMMUNITY
Start: 2021-09-01

## 2021-09-11 RX ORDER — ONDANSETRON 4 MG/1
4 TABLET, ORALLY DISINTEGRATING ORAL EVERY 6 HOURS PRN
Status: DISCONTINUED | OUTPATIENT
Start: 2021-09-11 | End: 2021-09-16 | Stop reason: HOSPADM

## 2021-09-11 RX ORDER — ONDANSETRON 2 MG/ML
4 INJECTION INTRAMUSCULAR; INTRAVENOUS EVERY 6 HOURS PRN
Status: DISCONTINUED | OUTPATIENT
Start: 2021-09-11 | End: 2021-09-16 | Stop reason: HOSPADM

## 2021-09-11 RX ORDER — ALBUTEROL SULFATE 0.83 MG/ML
2.5 SOLUTION RESPIRATORY (INHALATION) EVERY 4 HOURS PRN
COMMUNITY
Start: 2021-08-26 | End: 2021-09-23

## 2021-09-11 RX ORDER — SODIUM CHLORIDE 9 MG/ML
INJECTION, SOLUTION INTRAVENOUS CONTINUOUS
Status: DISCONTINUED | OUTPATIENT
Start: 2021-09-11 | End: 2021-09-12

## 2021-09-11 RX ORDER — NEBULIZER ACCESSORIES
KIT MISCELLANEOUS
COMMUNITY
Start: 2021-08-30

## 2021-09-11 RX ORDER — PREDNISONE 20 MG/1
TABLET ORAL
COMMUNITY
Start: 2021-01-22 | End: 2021-09-11

## 2021-09-11 RX ORDER — LISINOPRIL AND HYDROCHLOROTHIAZIDE 12.5; 2 MG/1; MG/1
1 TABLET ORAL DAILY
COMMUNITY
Start: 2021-05-27 | End: 2022-10-25

## 2021-09-11 RX ORDER — METHYLPREDNISOLONE SODIUM SUCCINATE 125 MG/2ML
125 INJECTION, POWDER, LYOPHILIZED, FOR SOLUTION INTRAMUSCULAR; INTRAVENOUS 2 TIMES DAILY
Status: DISCONTINUED | OUTPATIENT
Start: 2021-09-12 | End: 2021-09-12

## 2021-09-11 RX ORDER — BUDESONIDE 0.5 MG/2ML
0.5 INHALANT ORAL 2 TIMES DAILY
Status: DISCONTINUED | OUTPATIENT
Start: 2021-09-12 | End: 2021-09-16 | Stop reason: HOSPADM

## 2021-09-11 RX ORDER — LORAZEPAM 1 MG/1
1 TABLET ORAL 3 TIMES DAILY PRN
COMMUNITY
Start: 2021-08-27 | End: 2021-09-23

## 2021-09-11 RX ORDER — LORAZEPAM 0.5 MG/1
TABLET ORAL
Status: ON HOLD | COMMUNITY
Start: 2021-02-26 | End: 2021-09-12

## 2021-09-11 RX ORDER — CEFTRIAXONE SODIUM 1 G/50ML
1 INJECTION, SOLUTION INTRAVENOUS ONCE
Status: COMPLETED | OUTPATIENT
Start: 2021-09-11 | End: 2021-09-11

## 2021-09-11 RX ORDER — METHYLPREDNISOLONE SODIUM SUCCINATE 125 MG/2ML
125 INJECTION, POWDER, LYOPHILIZED, FOR SOLUTION INTRAMUSCULAR; INTRAVENOUS ONCE
Status: COMPLETED | OUTPATIENT
Start: 2021-09-11 | End: 2021-09-11

## 2021-09-11 RX ORDER — AZITHROMYCIN 250 MG/1
TABLET, FILM COATED ORAL
COMMUNITY
Start: 2021-01-22 | End: 2021-09-11

## 2021-09-11 RX ORDER — TAMSULOSIN HYDROCHLORIDE 0.4 MG/1
0.8 CAPSULE ORAL DAILY
COMMUNITY
Start: 2021-08-17 | End: 2022-10-25

## 2021-09-11 RX ORDER — IOPAMIDOL 755 MG/ML
100 INJECTION, SOLUTION INTRAVASCULAR ONCE
Status: COMPLETED | OUTPATIENT
Start: 2021-09-11 | End: 2021-09-11

## 2021-09-11 RX ORDER — LORAZEPAM 1 MG/1
1 TABLET ORAL
Status: ON HOLD | COMMUNITY
Start: 2021-08-23 | End: 2021-09-12

## 2021-09-11 RX ORDER — SULFACETAMIDE SODIUM 100 MG/ML
SOLUTION/ DROPS OPHTHALMIC
COMMUNITY
Start: 2021-06-25 | End: 2021-09-11

## 2021-09-11 RX ORDER — LORATADINE 10 MG/1
10 TABLET ORAL 2 TIMES DAILY
COMMUNITY
Start: 2021-07-14

## 2021-09-11 RX ORDER — MAGNESIUM OXIDE 400 MG/1
400 TABLET ORAL 2 TIMES DAILY
Status: DISCONTINUED | OUTPATIENT
Start: 2021-09-12 | End: 2021-09-16 | Stop reason: HOSPADM

## 2021-09-11 RX ADMIN — IPRATROPIUM BROMIDE AND ALBUTEROL SULFATE 3 ML: 2.5; .5 SOLUTION RESPIRATORY (INHALATION) at 23:47

## 2021-09-11 RX ADMIN — CEFTRIAXONE SODIUM 1 G: 1 INJECTION, SOLUTION INTRAVENOUS at 22:09

## 2021-09-11 RX ADMIN — SODIUM CHLORIDE: 9 INJECTION, SOLUTION INTRAVENOUS at 20:24

## 2021-09-11 RX ADMIN — IOPAMIDOL 100 ML: 755 INJECTION, SOLUTION INTRAVENOUS at 21:19

## 2021-09-11 RX ADMIN — METHYLPREDNISOLONE SODIUM SUCCINATE 125 MG: 125 INJECTION, POWDER, FOR SOLUTION INTRAMUSCULAR; INTRAVENOUS at 22:58

## 2021-09-11 ASSESSMENT — ENCOUNTER SYMPTOMS
FACIAL ASYMMETRY: 0
FLANK PAIN: 0
EYE PAIN: 0
ABDOMINAL PAIN: 0
WHEEZING: 1
FACIAL SWELLING: 0
CONFUSION: 0
SHORTNESS OF BREATH: 1
BACK PAIN: 0
TREMORS: 0
AGITATION: 0
SEIZURES: 0
STRIDOR: 0
FEVER: 0

## 2021-09-11 ASSESSMENT — MIFFLIN-ST. JEOR: SCORE: 1569.53

## 2021-09-11 NOTE — ED TRIAGE NOTES
ED Nursing Triage Note (General)   ________________________________    Brent Villagomez is a 76 year old Male that presents to triage ambulance  With history of irregular hr and waking up today from a nap without his oxygen on (hx of COPD). EMS stated heart rate was between 70-90 en route.   There were no vitals taken for this visit.t  Patient appears alert , in no acute distress., and cooperative behavior.    GCS Total = 15  Airway: intact  Breathing noted as Normal  Circulation Normal  Skin:  Normal      PRE HOSPITAL PRIOR LIVING SITUATION Alone

## 2021-09-12 PROBLEM — B18.2 CHRONIC HEPATITIS C WITHOUT HEPATIC COMA (H): Status: RESOLVED | Noted: 2021-09-12 | Resolved: 2021-09-12

## 2021-09-12 PROBLEM — B18.2 CHRONIC HEPATITIS C WITHOUT HEPATIC COMA (H): Status: ACTIVE | Noted: 2021-09-12

## 2021-09-12 LAB
ALBUMIN SERPL-MCNC: 3.8 G/DL (ref 3.5–5.7)
ALP SERPL-CCNC: 65 U/L (ref 34–104)
ALT SERPL W P-5'-P-CCNC: 27 U/L (ref 7–52)
ANION GAP SERPL CALCULATED.3IONS-SCNC: 8 MMOL/L (ref 3–14)
AST SERPL W P-5'-P-CCNC: 23 U/L (ref 13–39)
ATRIAL RATE - MUSE: 69 BPM
ATRIAL RATE - MUSE: 82 BPM
BASE EXCESS BLDA CALC-SCNC: -0.4 MMOL/L (ref -9–1.8)
BASOPHILS # BLD AUTO: 0 10E3/UL (ref 0–0.2)
BASOPHILS NFR BLD AUTO: 0 %
BILIRUB SERPL-MCNC: 0.5 MG/DL (ref 0.3–1)
BUN SERPL-MCNC: 21 MG/DL (ref 7–25)
CALCIUM SERPL-MCNC: 9.4 MG/DL (ref 8.6–10.3)
CHLORIDE BLD-SCNC: 106 MMOL/L (ref 98–107)
CO2 SERPL-SCNC: 23 MMOL/L (ref 21–31)
CREAT SERPL-MCNC: 0.9 MG/DL (ref 0.7–1.3)
DIASTOLIC BLOOD PRESSURE - MUSE: NORMAL MMHG
DIASTOLIC BLOOD PRESSURE - MUSE: NORMAL MMHG
EOSINOPHIL # BLD AUTO: 0 10E3/UL (ref 0–0.7)
EOSINOPHIL NFR BLD AUTO: 0 %
ERYTHROCYTE [DISTWIDTH] IN BLOOD BY AUTOMATED COUNT: 12.7 % (ref 10–15)
GFR SERPL CREATININE-BSD FRML MDRD: 84 ML/MIN/1.73M2
GLUCOSE BLD-MCNC: 155 MG/DL (ref 70–105)
HCO3 BLD-SCNC: 24 MMOL/L (ref 21–28)
HCT VFR BLD AUTO: 45.3 % (ref 40–53)
HGB BLD-MCNC: 16 G/DL (ref 13.3–17.7)
IMM GRANULOCYTES # BLD: 0 10E3/UL
IMM GRANULOCYTES NFR BLD: 1 %
INTERPRETATION ECG - MUSE: NORMAL
INTERPRETATION ECG - MUSE: NORMAL
LACTATE SERPL-SCNC: 1.5 MMOL/L (ref 0.7–2)
LYMPHOCYTES # BLD AUTO: 0.6 10E3/UL (ref 0.8–5.3)
LYMPHOCYTES NFR BLD AUTO: 10 %
MAGNESIUM SERPL-MCNC: 2 MG/DL (ref 1.9–2.7)
MCH RBC QN AUTO: 34.6 PG (ref 26.5–33)
MCHC RBC AUTO-ENTMCNC: 35.3 G/DL (ref 31.5–36.5)
MCV RBC AUTO: 98 FL (ref 78–100)
MONOCYTES # BLD AUTO: 0.1 10E3/UL (ref 0–1.3)
MONOCYTES NFR BLD AUTO: 2 %
NEUTROPHILS # BLD AUTO: 4.7 10E3/UL (ref 1.6–8.3)
NEUTROPHILS NFR BLD AUTO: 87 %
NRBC # BLD AUTO: 0 10E3/UL
NRBC BLD AUTO-RTO: 0 /100
O2/TOTAL GAS SETTING VFR VENT: 28 %
OXYHGB MFR BLD: 87 % (ref 92–100)
P AXIS - MUSE: 70 DEGREES
P AXIS - MUSE: 95 DEGREES
PCO2 BLD: 38 MM HG (ref 35–45)
PH BLD: 7.41 [PH] (ref 7.35–7.45)
PLATELET # BLD AUTO: 200 10E3/UL (ref 150–450)
PO2 BLD: 53 MM HG (ref 80–105)
POTASSIUM BLD-SCNC: 4.4 MMOL/L (ref 3.5–5.1)
PR INTERVAL - MUSE: 192 MS
PR INTERVAL - MUSE: 198 MS
PROCALCITONIN SERPL-MCNC: 1.01 NG/ML
PROT SERPL-MCNC: 6.5 G/DL (ref 6.4–8.9)
QRS DURATION - MUSE: 78 MS
QRS DURATION - MUSE: 86 MS
QT - MUSE: 350 MS
QT - MUSE: 408 MS
QTC - MUSE: 408 MS
QTC - MUSE: 437 MS
R AXIS - MUSE: -60 DEGREES
R AXIS - MUSE: -66 DEGREES
RBC # BLD AUTO: 4.62 10E6/UL (ref 4.4–5.9)
SODIUM SERPL-SCNC: 137 MMOL/L (ref 134–144)
SYSTOLIC BLOOD PRESSURE - MUSE: NORMAL MMHG
SYSTOLIC BLOOD PRESSURE - MUSE: NORMAL MMHG
T AXIS - MUSE: 50 DEGREES
T AXIS - MUSE: 57 DEGREES
VENTRICULAR RATE- MUSE: 69 BPM
VENTRICULAR RATE- MUSE: 82 BPM
WBC # BLD AUTO: 5.4 10E3/UL (ref 4–11)

## 2021-09-12 PROCEDURE — 250N000011 HC RX IP 250 OP 636: Performed by: PHYSICIAN ASSISTANT

## 2021-09-12 PROCEDURE — 94640 AIRWAY INHALATION TREATMENT: CPT | Mod: 76

## 2021-09-12 PROCEDURE — 99222 1ST HOSP IP/OBS MODERATE 55: CPT | Performed by: FAMILY MEDICINE

## 2021-09-12 PROCEDURE — 83735 ASSAY OF MAGNESIUM: CPT | Performed by: FAMILY MEDICINE

## 2021-09-12 PROCEDURE — 93010 ELECTROCARDIOGRAM REPORT: CPT | Performed by: INTERNAL MEDICINE

## 2021-09-12 PROCEDURE — 999N000157 HC STATISTIC RCP TIME EA 10 MIN

## 2021-09-12 PROCEDURE — 258N000003 HC RX IP 258 OP 636: Performed by: PHYSICIAN ASSISTANT

## 2021-09-12 PROCEDURE — 250N000009 HC RX 250: Performed by: FAMILY MEDICINE

## 2021-09-12 PROCEDURE — 84145 PROCALCITONIN (PCT): CPT | Performed by: FAMILY MEDICINE

## 2021-09-12 PROCEDURE — 94640 AIRWAY INHALATION TREATMENT: CPT

## 2021-09-12 PROCEDURE — 258N000003 HC RX IP 258 OP 636: Performed by: FAMILY MEDICINE

## 2021-09-12 PROCEDURE — 93005 ELECTROCARDIOGRAM TRACING: CPT

## 2021-09-12 PROCEDURE — 85025 COMPLETE CBC W/AUTO DIFF WBC: CPT | Performed by: PHYSICIAN ASSISTANT

## 2021-09-12 PROCEDURE — 250N000009 HC RX 250: Performed by: PHYSICIAN ASSISTANT

## 2021-09-12 PROCEDURE — 250N000013 HC RX MED GY IP 250 OP 250 PS 637: Performed by: FAMILY MEDICINE

## 2021-09-12 PROCEDURE — 82040 ASSAY OF SERUM ALBUMIN: CPT | Performed by: PHYSICIAN ASSISTANT

## 2021-09-12 PROCEDURE — 84450 TRANSFERASE (AST) (SGOT): CPT | Performed by: PHYSICIAN ASSISTANT

## 2021-09-12 PROCEDURE — 250N000012 HC RX MED GY IP 250 OP 636 PS 637: Performed by: FAMILY MEDICINE

## 2021-09-12 PROCEDURE — 87205 SMEAR GRAM STAIN: CPT | Performed by: FAMILY MEDICINE

## 2021-09-12 PROCEDURE — 36415 COLL VENOUS BLD VENIPUNCTURE: CPT | Performed by: PHYSICIAN ASSISTANT

## 2021-09-12 PROCEDURE — 120N000001 HC R&B MED SURG/OB

## 2021-09-12 PROCEDURE — 82805 BLOOD GASES W/O2 SATURATION: CPT | Performed by: FAMILY MEDICINE

## 2021-09-12 PROCEDURE — 36415 COLL VENOUS BLD VENIPUNCTURE: CPT | Performed by: FAMILY MEDICINE

## 2021-09-12 PROCEDURE — 83605 ASSAY OF LACTIC ACID: CPT | Performed by: FAMILY MEDICINE

## 2021-09-12 PROCEDURE — 36600 WITHDRAWAL OF ARTERIAL BLOOD: CPT | Performed by: FAMILY MEDICINE

## 2021-09-12 PROCEDURE — 250N000011 HC RX IP 250 OP 636: Performed by: FAMILY MEDICINE

## 2021-09-12 RX ORDER — MULTIVITAMIN,THERAPEUTIC
1 TABLET ORAL DAILY
COMMUNITY

## 2021-09-12 RX ORDER — LORAZEPAM 0.5 MG/1
.5-1 TABLET ORAL EVERY 4 HOURS PRN
Status: DISCONTINUED | OUTPATIENT
Start: 2021-09-12 | End: 2021-09-16 | Stop reason: HOSPADM

## 2021-09-12 RX ORDER — TAMSULOSIN HYDROCHLORIDE 0.4 MG/1
0.4 CAPSULE ORAL DAILY
Status: DISCONTINUED | OUTPATIENT
Start: 2021-09-12 | End: 2021-09-16 | Stop reason: HOSPADM

## 2021-09-12 RX ORDER — LISINOPRIL 20 MG/1
20 TABLET ORAL DAILY
Status: DISCONTINUED | OUTPATIENT
Start: 2021-09-12 | End: 2021-09-16 | Stop reason: HOSPADM

## 2021-09-12 RX ORDER — POLYETHYLENE GLYCOL 3350 17 G
2 POWDER IN PACKET (EA) ORAL
COMMUNITY
End: 2022-10-25

## 2021-09-12 RX ORDER — FAMOTIDINE 20 MG/1
20 TABLET, FILM COATED ORAL 2 TIMES DAILY
Status: DISCONTINUED | OUTPATIENT
Start: 2021-09-12 | End: 2021-09-16 | Stop reason: HOSPADM

## 2021-09-12 RX ORDER — AZITHROMYCIN 250 MG/1
250 TABLET, FILM COATED ORAL DAILY
Status: DISCONTINUED | OUTPATIENT
Start: 2021-09-12 | End: 2021-09-14

## 2021-09-12 RX ORDER — PREDNISONE 20 MG/1
40 TABLET ORAL DAILY
Status: DISCONTINUED | OUTPATIENT
Start: 2021-09-12 | End: 2021-09-16 | Stop reason: HOSPADM

## 2021-09-12 RX ORDER — ALBUTEROL SULFATE 0.83 MG/ML
2.5 SOLUTION RESPIRATORY (INHALATION)
Status: DISCONTINUED | OUTPATIENT
Start: 2021-09-12 | End: 2021-09-16 | Stop reason: HOSPADM

## 2021-09-12 RX ORDER — AMLODIPINE BESYLATE 5 MG/1
5 TABLET ORAL DAILY
Status: DISCONTINUED | OUTPATIENT
Start: 2021-09-12 | End: 2021-09-16 | Stop reason: HOSPADM

## 2021-09-12 RX ORDER — HYDROCHLOROTHIAZIDE 12.5 MG/1
12.5 CAPSULE ORAL DAILY
Status: DISCONTINUED | OUTPATIENT
Start: 2021-09-12 | End: 2021-09-16 | Stop reason: HOSPADM

## 2021-09-12 RX ORDER — ACETAMINOPHEN 325 MG/1
650 TABLET ORAL EVERY 4 HOURS PRN
Status: DISCONTINUED | OUTPATIENT
Start: 2021-09-12 | End: 2021-09-16 | Stop reason: HOSPADM

## 2021-09-12 RX ORDER — DOCUSATE SODIUM 100 MG/1
100 CAPSULE, LIQUID FILLED ORAL 2 TIMES DAILY PRN
COMMUNITY
End: 2022-08-12

## 2021-09-12 RX ORDER — LISINOPRIL AND HYDROCHLOROTHIAZIDE 12.5; 2 MG/1; MG/1
1 TABLET ORAL DAILY
Status: DISCONTINUED | OUTPATIENT
Start: 2021-09-12 | End: 2021-09-12 | Stop reason: CLARIF

## 2021-09-12 RX ADMIN — FAMOTIDINE 20 MG: 20 TABLET ORAL at 21:36

## 2021-09-12 RX ADMIN — MAGNESIUM OXIDE TAB 400 MG (241.3 MG ELEMENTAL MG) 400 MG: 400 (241.3 MG) TAB at 17:27

## 2021-09-12 RX ADMIN — LORAZEPAM 0.5 MG: 0.5 TABLET ORAL at 10:25

## 2021-09-12 RX ADMIN — LORAZEPAM 1 MG: 0.5 TABLET ORAL at 21:48

## 2021-09-12 RX ADMIN — ACETAMINOPHEN 650 MG: 325 TABLET, FILM COATED ORAL at 15:10

## 2021-09-12 RX ADMIN — ACETAMINOPHEN 650 MG: 325 TABLET, FILM COATED ORAL at 21:36

## 2021-09-12 RX ADMIN — SODIUM CHLORIDE: 9 INJECTION, SOLUTION INTRAVENOUS at 00:46

## 2021-09-12 RX ADMIN — FAMOTIDINE 20 MG: 20 TABLET ORAL at 10:12

## 2021-09-12 RX ADMIN — PREDNISONE 40 MG: 20 TABLET ORAL at 10:12

## 2021-09-12 RX ADMIN — IPRATROPIUM BROMIDE AND ALBUTEROL SULFATE 3 ML: 2.5; .5 SOLUTION RESPIRATORY (INHALATION) at 20:15

## 2021-09-12 RX ADMIN — TAMSULOSIN HYDROCHLORIDE 0.4 MG: 0.4 CAPSULE ORAL at 10:13

## 2021-09-12 RX ADMIN — MAGNESIUM OXIDE TAB 400 MG (241.3 MG ELEMENTAL MG) 400 MG: 400 (241.3 MG) TAB at 07:50

## 2021-09-12 RX ADMIN — AZITHROMYCIN MONOHYDRATE 250 MG: 250 TABLET ORAL at 10:12

## 2021-09-12 RX ADMIN — BUDESONIDE 0.5 MG: 0.5 INHALANT RESPIRATORY (INHALATION) at 07:27

## 2021-09-12 RX ADMIN — LORAZEPAM 1 MG: 0.5 TABLET ORAL at 15:15

## 2021-09-12 RX ADMIN — ENOXAPARIN SODIUM 40 MG: 100 INJECTION SUBCUTANEOUS at 10:12

## 2021-09-12 RX ADMIN — SODIUM CHLORIDE: 9 INJECTION, SOLUTION INTRAVENOUS at 12:06

## 2021-09-12 RX ADMIN — IPRATROPIUM BROMIDE AND ALBUTEROL SULFATE 3 ML: 2.5; .5 SOLUTION RESPIRATORY (INHALATION) at 07:27

## 2021-09-12 RX ADMIN — LISINOPRIL 20 MG: 20 TABLET ORAL at 10:13

## 2021-09-12 RX ADMIN — CEFTRIAXONE SODIUM 1 G: 1 INJECTION, SOLUTION INTRAVENOUS at 21:36

## 2021-09-12 RX ADMIN — AMLODIPINE BESYLATE 5 MG: 5 TABLET ORAL at 10:13

## 2021-09-12 RX ADMIN — AZITHROMYCIN MONOHYDRATE 500 MG: 500 INJECTION, POWDER, LYOPHILIZED, FOR SOLUTION INTRAVENOUS at 01:40

## 2021-09-12 RX ADMIN — BUDESONIDE 0.5 MG: 0.5 INHALANT RESPIRATORY (INHALATION) at 20:15

## 2021-09-12 RX ADMIN — FAMOTIDINE 20 MG: 10 INJECTION, SOLUTION INTRAVENOUS at 01:40

## 2021-09-12 RX ADMIN — IPRATROPIUM BROMIDE AND ALBUTEROL SULFATE 3 ML: 2.5; .5 SOLUTION RESPIRATORY (INHALATION) at 11:01

## 2021-09-12 RX ADMIN — HYDROCHLOROTHIAZIDE 12.5 MG: 12.5 CAPSULE, GELATIN COATED ORAL at 10:12

## 2021-09-12 RX ADMIN — IPRATROPIUM BROMIDE AND ALBUTEROL SULFATE 3 ML: 2.5; .5 SOLUTION RESPIRATORY (INHALATION) at 14:55

## 2021-09-12 ASSESSMENT — ACTIVITIES OF DAILY LIVING (ADL)
ADLS_ACUITY_SCORE: 17

## 2021-09-12 ASSESSMENT — MIFFLIN-ST. JEOR: SCORE: 1550.93

## 2021-09-12 NOTE — PROGRESS NOTES
Incentive Spirometry education completed.  Pt goal 2800 mls.  Pt achieved 2000 mls.  Pt instructed to perform 10/hr while awake with at least one deep breath and cough per hour until able to perform baseline activity.  RT will follow and re-assess as need.      Carmella Adames, RT on 9/12/2021 at 7:40 AM

## 2021-09-12 NOTE — PROGRESS NOTES
" [facility] ADMISSION NOTE    Patient admitted to room 333 at approximately 0030 via cart from emergency room. Patient was accompanied by spouse and transport tech.     Verbal SBAR report received from Elena prior to patient arrival.     Patient ambulated to bed with stand-by assist. Patient alert and oriented X 3. The patient is not having any pain.  . Admission vital signs: Blood pressure (!) 131/91, pulse 78, temperature 99.4  F (37.4  C), temperature source Tympanic, resp. rate 28, height 1.727 m (5' 8\"), weight 85.5 kg (188 lb 8 oz), SpO2 94 %. Patient was oriented to plan of care, call light, bed controls, tv, telephone, bathroom and visiting hours.     Risk Assessment    The following safety risks were identified during admission: fall. Yellow risk band applied: YES.     Skin Initial Assessment    This writer admitted this patient and completed a full skin assessment and Armani score in the Adult PCS flowsheet. Appropriate interventions initiated as needed.        Education    Patient has a [place] to Observation order: No  Observation education completed and documented: N/A      Robin Uribe RN    "

## 2021-09-12 NOTE — ASSESSMENT & PLAN NOTE
On home oxygen chronically, some increased oxygen needs in the ED  Monitor, wean back to baseline if possible  Will need ongoing oxygen support at discharge

## 2021-09-12 NOTE — H&P
[facility]    History and Physical - Hospitalist Service       Date of Admission:  9/11/2021    Assessment & Plan      Brent Villagomez is a 76 year old male admitted on 9/11/2021. He presents from home with concerns of irregular heart rate, but on arrival to the ED was complaining of increased shortness of breath and wheezing, worsening over the past day or so.  Patient has severe COPD, on home oxygen followed by pulmonary at [facility].  In the ED, he had a low-grade temperature and was working harder than baseline with some increased oxygen needs.  Lab work showed a normal CBC, electrolytes and liver tests were normal with a normal lactate but an elevated CRP and elevated D-dimer.  Chest x-ray showed a right perihilar infiltrate.  CT imaging to rule out pulmonary embolism was negative for pulmonary embolism.  Patient has been Covid vaccinated, and Covid testing was negative.  Patient has been admitted for the treatment of pneumonia with COPD exacerbation.  Currently being treated for bacterial pneumonia with Rocephin and azithromycin and also being treated for COPD exacerbation with steroids and frequent scheduled nebs.  Overnight he is feeling better at his baseline oxygen needs.  This morning will check a procalcitonin and encourage him to be up and about see how he does.  Cultures are pending.  This point will likely stay in the hospital until tomorrow unless he continues to improve and wants to be discharged later today.    COPD exacerbation (H)  Presenting to ER with increased shortness of breath  History of severe COPD on home oxygen at 2 L  Emergency department with increased oxygen requirements, chest x-ray showing right sided infiltrate, CT imaging not showing pulmonary embolism  Has been treated with steroids, nebs with improvement overnight  Continue antibiotics, continue frequent scheduled nebs and steroids  May be feeling closer to baseline, consider discharge sooner although at  this point he would rather stay in the hospital for another day.    Pneumonia of right lower lobe due to infectious organism  Present with increased shortness of breath, cough bringing up colored phlegm  Chest x-ray with increase markings consistent with right perihilar or basilar pneumonia  Has been started on Rocephin/azithromycin for community-acquired pneumonia  Continue for now, procalcitonin pending    Acute on chronic respiratory failure with hypoxia (H)  On home oxygen chronically, some increased oxygen needs in the ED  Monitor, wean back to baseline if possible  Will need ongoing oxygen support at discharge    Hypertension  Controlled at home taking lisinopril/hydrochlorothiazide and Norvasc  Continue home dosing         Diet: Regular Diet Adult    DVT Prophylaxis: Enoxaparin (Lovenox) SQ  Burris Catheter: Not present  Central Lines: None  Code Status: Full Code      Clinically Significant Risk Factors Present on Admission              # Platelet Defect: home medication list includes an antiplatelet medication      Disposition Plan   Expected discharge:  tomorrow recommended to prior living arrangement once antibiotic plan established and Stable pulmonary status.     The patient's care was discussed with the Patient.    Bryan Mason MD  [facility]  Securely message with the Vocera Web Console (learn more here)  Text page via [facility] Paging/Directory      ______________________________________________________________________    Chief Complaint   76-year-old male presenting with increased shortness of breath    History is obtained from the patient, electronic health record and emergency department physician    History of Present Illness   Brent Villagomez is a 76 year old male who presents to the emergency department with concerns of a regular heart rate.  Described that his heart was going fast and slow.  This was associated with increased cough and shortness of breath.  Patient has  severe COPD on home oxygen and states over the past day his breathing is gotten worse despite using nebs.  Bringing up colored phlegm.  Denies fever or chills.  Patient has been Covid vaccinated and Covid testing in the ED was negative.  Feeling more tired and weak, nauseated without vomiting, no change in stools.  History significant for severe COPD, stop smoking 5 years ago.  No previous heart issues.    Review of Systems    All other systems reviewed and negative other than noted in the HPI or here.       Past Medical History    I have reviewed this patient's medical history and updated it with pertinent information if needed.   Past Medical History:   Diagnosis Date     Chronic hepatitis C (H)     No Comments Provided     Disorder of porphyrin metabolism (H)     No Comments Provided     Lumbar sprain     No Comments Provided     Other and unspecified alcohol dependence, unspecified drinking behavior     No Comments Provided     Other specified chronic obstructive airways disease     No Comments Provided     Personal history of other specified diseases     occupational, hammering     Polycythemia, secondary     No Comments Provided     Unspecified visual loss     near and far       Past Surgical History   I have reviewed this patient's surgical history and updated it with pertinent information if needed.  Past Surgical History:   Procedure Laterality Date     CLOSED REDUCTION ANKLE      Fx ankle with plate       Social History   I have reviewed this patient's social history and updated it with pertinent information if needed.  Social History     Tobacco Use     Smoking status: Current Every Day Smoker     Packs/day: 1.00     Types: Cigarettes     Smokeless tobacco: Never Used   Substance Use Topics     Alcohol use: No     Drug use: Unknown     Types: Other     Comment: Drug use: No       Family History   I have reviewed this patient's family history and updated it with pertinent information if needed.  Family  History   Problem Relation Age of Onset     Heart Disease Mother         Heart Disease,valve disease/rheumatic     Other - See Comments Father         sudden death age 70     Arthritis Father         Arthritis     Cancer Brother         Cancer,Skin cancer       Prior to Admission Medications   Prior to Admission Medications   Prescriptions Last Dose Informant Patient Reported? Taking?   Fluticasone-Umeclidin-Vilanterol (TRELEGY ELLIPTA) 100-62.5-25 MCG/INH oral inhaler 9/11/2021 at am  Yes Yes   Sig: Inhale 1 puff into the lungs   LORazepam (ATIVAN) 0.5 MG tablet   Yes No   LORazepam (ATIVAN) 1 MG tablet 9/11/2021 at am  Yes Yes   Sig: Take 1 mg by mouth   LORazepam (ATIVAN) 1 MG tablet   Yes No   Respiratory Therapy Supplies (INNOSPIRE REPLACEMENT FILTER) MISC   Yes Yes   Respiratory Therapy Supplies (NEBULIZER/TUBING/MOUTHPIECE) KIT   Yes Yes   Sig: Use when taking nebs   TRELEGY ELLIPTA 100-62.5-25 MCG/INH oral inhaler   Yes No   albuterol (PROAIR HFA/PROVENTIL HFA/VENTOLIN HFA) 108 (90 Base) MCG/ACT inhaler Past Month at Unknown time  Yes Yes   albuterol (PROVENTIL) (2.5 MG/3ML) 0.083% neb solution 9/11/2021 at am  Yes Yes   Sig: Inhale 2.5 mg into the lungs   albuterol (PROVENTIL) (2.5 MG/3ML) 0.083% neb solution   Yes No   amLODIPine (NORVASC) 2.5 MG tablet   Yes No   amLODIPine (NORVASC) 5 MG tablet 9/11/2021 at am  Yes Yes   aspirin EC 81 MG EC tablet 9/11/2021 at am  Yes Yes   Sig: Take 81 mg by mouth   lisinopril (PRINIVIL/ZESTRIL) 20 MG tablet   Yes No   lisinopril-hydrochlorothiazide (ZESTORETIC) 20-12.5 MG tablet 9/11/2021 at am  Yes Yes   loratadine (CLARITIN) 10 MG tablet 9/11/2021 at am  Yes Yes   montelukast (SINGULAIR) 10 MG tablet 9/10/2021 at pm  Yes Yes   tamsulosin (FLOMAX) 0.4 MG capsule 9/11/2021 at am  Yes Yes      Facility-Administered Medications: None     Allergies   Allergies   Allergen Reactions     No Clinical Screening - See Comments      Dust and pollen       Physical Exam   Vital  Signs: Temp: 97.7  F (36.5  C) Temp src: Tympanic BP: 110/74 Pulse: 70   Resp: 24 SpO2: 90 % O2 Device: Nasal cannula Oxygen Delivery: 2 LPM  Weight: 184 lbs 6.4 oz    General Appearance: Lying on the bed, in no respiratory distress at this time with nasal oxygen in place  Eyes: Pupils equal, reactive, EOM intact  HEENT: Nose mouth and throat normal  Respiratory: Wheezing bilaterally, moving air fairly well  Cardiovascular: Regular rate rhythm, no murmur heard, no peripheral edema  GI: Abdomen is soft, nontender, no masses  Lymph/Hematologic: Cervical nodes negative  Genitourinary: Not examined  Skin: No lesions rashes or bruising  Musculoskeletal: Moving arms and legs equally, no signs of any deformities  Neurologic: No focal deficits, cranial nerves normal, did not attempt to walk  Psychiatric: Mood and affect are normal    Data   Data reviewed today: I reviewed all medications, new labs and imaging results over the last 24 hours. I personally reviewed the chest x-ray image(s) showing Increased markings in the perihilar region on the right side.    Results for orders placed or performed during the hospital encounter of 09/11/21   XR Chest Port 1 View     Status: None    Narrative    Procedure:XR CHEST PORT 1 VIEW    Clinical history:Male, 76 years, SOB    Technique: Single view was obtained.    Comparison: 1/22/2021    Findings: The cardiac silhouette is normal. The pulmonary vasculature  is normal.    The lungs are hyperinflated. There is increased density seen along the  medial aspect of the right hemidiaphragm, partially silhouetting the  right heart border. Bony structures are unremarkable.      Impression    Impression:   Medial right perihilar/ basilar pneumonia.    BRIAN BERMUDEZ MD         SYSTEM ID:  [id number]   CT Chest Pulmonary Embolism w Contrast     Status: None    Narrative    CT CHEST PULMONARY EMBOLISM W CONTRAST  9/11/2021 9:14 PM    CLINICAL HISTORY: Male, age 76 years,  PE suspected,  low/intermediate  prob, positive D-dimer;    Comparison:  CT scan of chest 6/11/2021.    TECHNIQUE:  CT was performed of the chest  with IV contrast.   Sagittal, coronal, axial and MIP reconstructions were reviewed.     FINDINGS:  Chest CT:    Excellent opacification of the pulmonary arteries demonstrates no  evidence of pulmonary embolus. Thoracic aorta is intact and  demonstrate scattered areas of calcified noncalcified atherosclerotic  plaque. No evidence of acute abnormality within the are    The lungs demonstrate severe emphysematous changes,, similar in  severity. There is now consolidation seen in the superior segment of  the right lower lobe. This atelectasis in the dependent portions of  the lungs. A 6 mm pleural-based nodule located posteriorly in the  right lower lobe appears slightly larger.    Thyroid gland and the esophagus are grossly normal. Mediastinal and  hilar lymph nodes is slightly larger than the current examination  without pathologic enlargement.      Visualized portions of the upper abdomen demonstrate a 2 cm low dense  lesion of the right kidney. No lymphadenopathy.    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      Impression    IMPRESSION:   No evidence of acute vascular abnormality. No evidence of pulmonary  embolus or aortic dissection.    Severe emphysematous changes with pneumonia/consolidation and  atelectasis in the right lower lobe.    6 mm to 7 mm pleural-based nodule located posteriorly in the right  lower lobe appears to be increased slightly in size dating back to  6/11/2021. This increase in size may be related to adjacent areas of  atelectasis.    Stable appearance of ununited fracture involving the posterior aspect  of the right 11th rib.    BRIAN BERMUDEZ MD         SYSTEM ID:  [id number]   CBC with platelets differential     Status: Abnormal    Narrative    The following orders were created for panel order CBC  with platelets differential.  Procedure                               Abnormality         Status                     ---------                               -----------         ------                     CBC with platelets and d...[[id number]]  Abnormal            Final result                 Please view results for these tests on the individual orders.   Comprehensive metabolic panel     Status: Normal   Result Value Ref Range    Sodium 139 134 - 144 mmol/L    Potassium 4.2 3.5 - 5.1 mmol/L    Chloride 104 98 - 107 mmol/L    Carbon Dioxide (CO2) 27 21 - 31 mmol/L    Anion Gap 8 3 - 14 mmol/L    Urea Nitrogen 21 7 - 25 mg/dL    Creatinine 1.01 0.70 - 1.30 mg/dL    Calcium 9.5 8.6 - 10.3 mg/dL    Glucose 99 70 - 105 mg/dL    Alkaline Phosphatase 63 34 - 104 U/L    AST 26 13 - 39 U/L    ALT 28 7 - 52 U/L    Protein Total 6.8 6.4 - 8.9 g/dL    Albumin 4.1 3.5 - 5.7 g/dL    Bilirubin Total 0.6 0.3 - 1.0 mg/dL    GFR Estimate 73 >60 mL/min/1.73m2   Lactic acid whole blood     Status: Normal   Result Value Ref Range    Lactic Acid 1.2 0.7 - 2.0 mmol/L   Troponin I     Status: Normal   Result Value Ref Range    Troponin I 9.9 0.0 - 34.0 pg/mL   Blood gas arterial and oxyhgb     Status: Abnormal   Result Value Ref Range    pH Arterial 7.43 7.35 - 7.45    pCO2 Arterial 38 35 - 45 mm Hg    pO2 Arterial 57 (L) 80 - 105 mm Hg    Bicarbonate Arterial 25 21 - 28 mmol/L    Oxyhemoglobin Arterial 91 (L) 92 - 100 %    Base Excess/Deficit (+/-) 0.9 -9.0 - 1.8 mmol/L    FIO2 2    CRP inflammation     Status: Abnormal   Result Value Ref Range    CRP Inflammation 43.6 (H) <10.0 mg/L   UA with Microscopic reflex to Culture     Status: Abnormal    Specimen: Urine, Clean Catch   Result Value Ref Range    Color Urine Light Yellow Colorless, Straw, Light Yellow, Yellow    Appearance Urine Clear Clear    Glucose Urine Negative Negative mg/dL    Bilirubin Urine Negative Negative    Ketones Urine Negative Negative mg/dL    Specific Gravity Urine  1.031 (H) 1.000 - 1.030    Blood Urine Negative Negative    pH Urine 6.0 5.0 - 9.0    Protein Albumin Urine Negative Negative mg/dL    Urobilinogen Urine Normal Normal, 2.0 mg/dL    Nitrite Urine Negative Negative    Leukocyte Esterase Urine Negative Negative    Mucus Urine Present (A) None Seen /LPF    RBC Urine <1 <=2 /HPF    WBC Urine 0 <=5 /HPF    Narrative    Urine Culture not indicated   Magnesium     Status: Abnormal   Result Value Ref Range    Magnesium 1.8 (L) 1.9 - 2.7 mg/dL   D dimer quantitative     Status: Abnormal   Result Value Ref Range    D-Dimer Quantitative 1.40 (H) 0.00 - 0.50 ug/mL FEU    Narrative    This D-dimer assay is intended for use in conjunction with a clinical pretest probability assessment model to exclude pulmonary embolism (PE) and deep venous thrombosis (DVT) in outpatients suspected of PE or DVT. The cut-off value is 0.50 ug/mL FEU.   CBC with platelets and differential     Status: Abnormal   Result Value Ref Range    WBC Count 10.9 4.0 - 11.0 10e3/uL    RBC Count 4.61 4.40 - 5.90 10e6/uL    Hemoglobin 16.2 13.3 - 17.7 g/dL    Hematocrit 45.4 40.0 - 53.0 %    MCV 99 78 - 100 fL    MCH 35.1 (H) 26.5 - 33.0 pg    MCHC 35.7 31.5 - 36.5 g/dL    RDW 12.7 10.0 - 15.0 %    Platelet Count 210 150 - 450 10e3/uL    % Neutrophils 74 %    % Lymphocytes 11 %    % Monocytes 11 %    % Eosinophils 3 %    % Basophils 1 %    % Immature Granulocytes 0 %    NRBCs per 100 WBC 0 <1 /100    Absolute Neutrophils 8.1 1.6 - 8.3 10e3/uL    Absolute Lymphocytes 1.3 0.8 - 5.3 10e3/uL    Absolute Monocytes 1.2 0.0 - 1.3 10e3/uL    Absolute Eosinophils 0.4 0.0 - 0.7 10e3/uL    Absolute Basophils 0.1 0.0 - 0.2 10e3/uL    Absolute Immature Granulocytes 0.0 <=0.0 10e3/uL    Absolute NRBCs 0.0 10e3/uL   Symptomatic COVID-19 Virus (Coronavirus) by PCR Nose     Status: Normal    Specimen: Nose; Swab   Result Value Ref Range    SARS CoV2 PCR Negative Negative    Narrative    Testing was performed using the Xpert  Xpress SARS-CoV-2 Assay on the   Cepheid Gene-Xpert Instrument Systems. Additional information about   this Emergency Use Authorization (EUA) assay can be found via the Lab   Guide. This test should be ordered for the detection of SARS-CoV-2 in   individuals who meet SARS-CoV-2 clinical and/or epidemiological   criteria. Test performance is unknown in asymptomatic patients. This   test is for in vitro diagnostic use under the FDA EUA for   laboratories certified under CLIA to perform high complexity testing.   This test has not been FDA cleared or approved. A negative result   does not rule out the presence of PCR inhibitors in the specimen or   target RNA in concentration below the limit of detection for the   assay. The possibility of a false negative should be considered if   the patient's recent exposure or clinical presentation suggests   COVID-19. This test was validated by [facility]. This laboratory is certified under the Clinic  al Laboratory Improvement Amendments (CLIA) as qualified to perform high complex  ity clinical laboratory testing.   Blood gas arterial with oxyhemoglobin     Status: Abnormal   Result Value Ref Range    pH Arterial 7.41 7.35 - 7.45    pCO2 Arterial 38 35 - 45 mm Hg    pO2 Arterial 53 (L) 80 - 105 mm Hg    Bicarbonate Arterial 24 21 - 28 mmol/L    Oxyhemoglobin Arterial 87 (L) 92 - 100 %    Base Excess/Deficit (+/-) -0.4 -9.0 - 1.8 mmol/L    FIO2 28    CBC with platelets differential     Status: Abnormal    Narrative    The following orders were created for panel order CBC with platelets differential.  Procedure                               Abnormality         Status                     ---------                               -----------         ------                     CBC with platelets and d...[[id number]]  Abnormal            Final result                 Please view results for these tests on the individual orders.   CBC  with platelets and differential     Status: Abnormal   Result Value Ref Range    WBC Count 5.4 4.0 - 11.0 10e3/uL    RBC Count 4.62 4.40 - 5.90 10e6/uL    Hemoglobin 16.0 13.3 - 17.7 g/dL    Hematocrit 45.3 40.0 - 53.0 %    MCV 98 78 - 100 fL    MCH 34.6 (H) 26.5 - 33.0 pg    MCHC 35.3 31.5 - 36.5 g/dL    RDW 12.7 10.0 - 15.0 %    Platelet Count 200 150 - 450 10e3/uL    % Neutrophils 87 %    % Lymphocytes 10 %    % Monocytes 2 %    % Eosinophils 0 %    % Basophils 0 %    % Immature Granulocytes 1 %    NRBCs per 100 WBC 0 <1 /100    Absolute Neutrophils 4.7 1.6 - 8.3 10e3/uL    Absolute Lymphocytes 0.6 (L) 0.8 - 5.3 10e3/uL    Absolute Monocytes 0.1 0.0 - 1.3 10e3/uL    Absolute Eosinophils 0.0 0.0 - 0.7 10e3/uL    Absolute Basophils 0.0 0.0 - 0.2 10e3/uL    Absolute Immature Granulocytes 0.0 <=0.0 10e3/uL    Absolute NRBCs 0.0 10e3/uL

## 2021-09-12 NOTE — ASSESSMENT & PLAN NOTE
Present with increased shortness of breath, cough bringing up colored phlegm  Chest x-ray with increase markings consistent with right perihilar or basilar pneumonia  Has been started on Rocephin/azithromycin for community-acquired pneumonia  Continue for now, procalcitonin pending

## 2021-09-12 NOTE — PHARMACY-ADMISSION MEDICATION HISTORY
Pharmacy -- Admission Medication Reconciliation    Prior to admission (PTA) medications were reviewed and the patient's PTA medication list was updated.    Sources Consulted: patient pill bottles,     The reliability of this Medication Reconciliation is: Reliability: Reliable    The following significant changes were made:    Removed albuterol nebs duplicate    Removed amlodipine 2.5 mg rx    Removed lisinopril 20 mg rx    Removed lorazepam 0.5 mg rx    Removed duplicate lorazepam 1 mg rx        Added nicotine lozenge    Added mvi    Added stool softner    Added directions to albuterol mdi    Added directions to albuterol nebs    Added directions to amlodipine 5 mg rx    Added directions to lisinopril/hctz    Added directions to loratadine    Added directions to lorazepam    Added directions to montelukast    Added directions to tamsulosin        In addition, the patient's allergies were reviewed with the patient and updated as follows:   Allergies: No clinical screening - see comments    The pharmacist has reviewed with the patient that all personal medications should be removed from the building or locked in the belongings safe.  Patient shall only take medications ordered by the physician and administered by the nursing staff.       Medication barriers identified: none   Medication adherence concerns: none   Understanding of emergency medications: yes, has nebs and inhalers on hand    Kemi Torres RPH, 9/12/2021,  7:25 AM

## 2021-09-12 NOTE — PROGRESS NOTES
"Patient is alert and orientated. Reported feeling anxious but then declined ativan as he felt \"okay\". Denies pain at this time but states he has chronic back and arthritic pain. SOB at rest that worsens with exertion and states he requires up to 4 LPM at home to recover, tachypneic, and pursed lip breathing at times. Currently on chronic 2 LPM nasal cannula, IS use independently and encouraged with room visits. Infrequent congested, productive cough, sputum collection container at bedside, exp wheezes throughout and dim in bases. Heart regular, on tele. Trace edema and poor cap refill in feet, elevated as tolerated. Reported he occasionally experiences hesitancy and difficulty fully emptying his bladder, was able to void without difficulty this AM. SBA. VSS. Will continue to monitor.    /74   Pulse 82   Temp 97.7  F (36.5  C) (Tympanic)   Resp 24   Ht 1.727 m (5' 8\")   Wt 83.6 kg (184 lb 6.4 oz)   SpO2 90%   BMI 28.04 kg/m      "

## 2021-09-12 NOTE — ASSESSMENT & PLAN NOTE
Presenting to ER with increased shortness of breath  History of severe COPD on home oxygen at 2 L  Emergency department with increased oxygen requirements, chest x-ray showing right sided infiltrate, CT imaging not showing pulmonary embolism  Has been treated with steroids, nebs with improvement overnight  Continue antibiotics, continue frequent scheduled nebs and steroids  May be feeling closer to baseline, consider discharge sooner although at this point he would rather stay in the hospital for another day.

## 2021-09-12 NOTE — PLAN OF CARE
Patient admitted with COPD exacerbation.  He is alert, oriented, and pleasant.  He is up with SBA.  He is getting IV abx and scheduled nebs, he is on 2 lpm of O2 which he does use at home.  He has exp. Wheezes and SOB at rest and with activity.  He does have increased SOB with anxiety at times and did request Ativan twice on this shift.  Will continue to monitor and treat as indicated.

## 2021-09-12 NOTE — PROGRESS NOTES
Patient on 2L O2 as at home.  Nebs given as ordered and patient using IS.  Breath sounds exp wheezes throughout.

## 2021-09-12 NOTE — ED PROVIDER NOTES
History     Chief Complaint   Patient presents with     Palpitations     HPI  Brent Villagomez is a 76 year old male who reports he is been having a very irregular heart rate.  He states at times it seems like it has really fast and other times it seemed to get really slow.  He has been having increased shortness of breath and dyspnea with exertion.  He has a history of COPD and is oxygen dependent and currently uses up to 2 L nasal cannula.  Today he woke up from a nap and felt his heart rate was very irregular.  He did have a visor Covid vaccination series in March.  He was noted to have a low grade temp at 99.4.  He reports he uses albuterol with a nebulizer regularly.    Allergies:  Allergies   Allergen Reactions     No Clinical Screening - See Comments      Dust and pollen       Problem List:    Patient Active Problem List    Diagnosis Date Noted     Obesity 09/15/2009     Priority: Medium     Lichenification 11/19/2008     Priority: Medium     Benign neoplasm of colon 08/12/2008     Priority: Medium     Polycythemia, secondary 04/08/2008     Priority: Medium     Osteoarthritis, hand 09/12/2006     Priority: Medium     Tobacco user 05/10/2006     Priority: Medium     Degeneration of lumbar or lumbosacral intervertebral disc 03/27/2006     Priority: Medium     Actinic keratosis 06/21/2005     Priority: Medium     Obstructive chronic bronchitis without exacerbation (H) 07/01/2003     Priority: Medium     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     Priority: Medium     Acute hepatitis C virus infection 12/01/2000     Priority: Medium     Hypertension 06/22/2000     Priority: Medium        Past Medical History:    Past Medical History:   Diagnosis Date     Chronic hepatitis C (H)      Disorder of porphyrin metabolism (H)      Lumbar sprain      Other and unspecified alcohol dependence, unspecified drinking behavior      Other specified chronic obstructive airways disease      Personal history of other  specified diseases      Polycythemia, secondary      Unspecified visual loss        Past Surgical History:    Past Surgical History:   Procedure Laterality Date     CLOSED REDUCTION ANKLE      Fx ankle with plate       Family History:    Family History   Problem Relation Age of Onset     Heart Disease Mother         Heart Disease,valve disease/rheumatic     Other - See Comments Father         sudden death age 70     Arthritis Father         Arthritis     Cancer Brother         Cancer,Skin cancer       Social History:  Marital Status:  Single [1]  Social History     Tobacco Use     Smoking status: Current Every Day Smoker     Packs/day: 1.00     Types: Cigarettes     Smokeless tobacco: Never Used   Substance Use Topics     Alcohol use: No     Drug use: Unknown     Types: Other     Comment: Drug use: No        Medications:    albuterol (PROVENTIL) (2.5 MG/3ML) 0.083% neb solution  Fluticasone-Umeclidin-Vilanterol (TRELEGY ELLIPTA) 100-62.5-25 MCG/INH oral inhaler  LORazepam (ATIVAN) 1 MG tablet  Respiratory Therapy Supplies [place]IN[place]RE [place]AC[place] [facility] [place] Therapy Supp[place] (NEBULIZER/TUBING/MOUTHPIECE) KIT  albuterol (PROAIR HFA/PROVENTIL HFA/VENTOLIN HFA) 108 (90 Base) MCG/ACT inhaler  albuterol (PROVENTIL) (2.5 MG/3ML) 0.083% neb solution  amLODIPine (NORVASC) 2.5 MG tablet  amLODIPine (NORVASC) 5 MG tablet  aspirin EC 81 MG EC tablet  azithromycin (ZITHROMAX) 250 MG tablet  buPROPion (WELLBUTRIN SR) 150 MG 12 hr tablet  lisinopril (PRINIVIL/ZESTRIL) 20 MG tablet  lisinopril-hydrochlorothiazide (ZESTORETIC) 20-12.5 MG tablet  loratadine (CLARITIN) 10 MG tablet  LORazepam (ATIVAN) 0.5 MG tablet  LORazepam (ATIVAN) 1 MG tablet  montelukast (SINGULAIR) 10 MG tablet  predniSONE (DELTASONE) 20 MG tablet  sulfacetamide (BLEPH-10) 10 % ophthalmic solution  tamsulosin (FLOMAX) 0.4 MG capsule  TRELEGY ELLIPTA 100-62.5-25 MCG/INH oral inhaler          Review of Systems   Constitutional: Negative for fever.  "  HENT: Negative for drooling and facial swelling.    Eyes: Negative for pain and visual disturbance.   Respiratory: Positive for shortness of breath and wheezing. Negative for stridor.    Cardiovascular: Negative for chest pain.   Gastrointestinal: Negative for abdominal pain.   Genitourinary: Negative for flank pain.   Musculoskeletal: Negative for back pain.   Skin: Negative for pallor.   Neurological: Negative for tremors, seizures and facial asymmetry.   Psychiatric/Behavioral: Negative for agitation and confusion.   All other systems reviewed and are negative.      Physical Exam   BP: 113/83  Pulse: 89  Temp: 99.4  F (37.4  C)  Resp: 24  Height: 172.7 cm (5' 8\")  Weight: 85.5 kg (188 lb 8 oz)  SpO2: 91 %    Vitals:    09/11/21 2015 09/11/21 2030 09/11/21 2045 09/11/21 2100   BP:  125/77  121/85   Pulse: 84 91 85 83   Resp:  (!) 35 26 28   Temp:       TempSrc:       SpO2: 92% 92% 93% 92%   Weight:       Height:           Physical Exam  Vitals and nursing note reviewed.   Constitutional:       General: He is not in acute distress.     Appearance: Normal appearance. He is not ill-appearing or toxic-appearing.   HENT:      Head: Normocephalic. No raccoon eyes, right periorbital erythema or left periorbital erythema.      Right Ear: No drainage or tenderness.      Left Ear: No drainage or tenderness.      Nose: Nose normal.   Eyes:      General: Lids are normal. Gaze aligned appropriately. No scleral icterus.     Extraocular Movements: Extraocular movements intact.   Neck:      Trachea: No tracheal deviation.   Cardiovascular:      Rate and Rhythm: Normal rate.   Pulmonary:      Effort: Pulmonary effort is normal. No respiratory distress.      Breath sounds: No stridor. Wheezing present.      Comments: Lung sounds are decreased throughout but slight expiratory wheeze noted on the left.  He does not appear to be in any respiratory distress.  No tachypnea.  SaO2 is 91% on 2 L.  Abdominal:      Tenderness: There is no " abdominal tenderness.   Musculoskeletal:         General: No deformity or signs of injury. Normal range of motion.      Cervical back: Normal range of motion. No signs of trauma.   Skin:     General: Skin is warm and dry.      Coloration: Skin is not jaundiced or pale.   Neurological:      General: No focal deficit present.      Mental Status: He is alert and oriented to person, place, and time.      GCS: GCS eye subscore is 4. GCS verbal subscore is 5. GCS motor subscore is 6.      Motor: No tremor or seizure activity.   Psychiatric:         Attention and Perception: Attention normal.         Mood and Affect: Mood normal.         ED Course     Results for orders placed or performed during the hospital encounter of 09/11/21 (from the past 24 hour(s))   CBC with platelets differential    Narrative    The following orders were created for panel order CBC with platelets differential.  Procedure                               Abnormality         Status                     ---------                               -----------         ------                     CBC with platelets and d...[[id number]]  Abnormal            Final result                 Please view results for these tests on the individual orders.   Comprehensive metabolic panel   Result Value Ref Range    Sodium 139 134 - 144 mmol/L    Potassium 4.2 3.5 - 5.1 mmol/L    Chloride 104 98 - 107 mmol/L    Carbon Dioxide (CO2) 27 21 - 31 mmol/L    Anion Gap 8 3 - 14 mmol/L    Urea Nitrogen 21 7 - 25 mg/dL    Creatinine 1.01 0.70 - 1.30 mg/dL    Calcium 9.5 8.6 - 10.3 mg/dL    Glucose 99 70 - 105 mg/dL    Alkaline Phosphatase 63 34 - 104 U/L    AST 26 13 - 39 U/L    ALT 28 7 - 52 U/L    Protein Total 6.8 6.4 - 8.9 g/dL    Albumin 4.1 3.5 - 5.7 g/dL    Bilirubin Total 0.6 0.3 - 1.0 mg/dL    GFR Estimate 73 >60 mL/min/1.73m2   Lactic acid whole blood   Result Value Ref Range    Lactic Acid 1.2 0.7 - 2.0 mmol/L   Troponin I   Result Value Ref Range    Troponin I 9.9 0.0 -  34.0 pg/mL   Blood gas arterial and oxyhgb   Result Value Ref Range    pH Arterial 7.43 7.35 - 7.45    pCO2 Arterial 38 35 - 45 mm Hg    pO2 Arterial 57 (L) 80 - 105 mm Hg    Bicarbonate Arterial 25 21 - 28 mmol/L    Oxyhemoglobin Arterial 91 (L) 92 - 100 %    Base Excess/Deficit (+/-) 0.9 -9.0 - 1.8 mmol/L    FIO2 2    CRP inflammation   Result Value Ref Range    CRP Inflammation 43.6 (H) <10.0 mg/L   Magnesium   Result Value Ref Range    Magnesium 1.8 (L) 1.9 - 2.7 mg/dL   D dimer quantitative   Result Value Ref Range    D-Dimer Quantitative 1.40 (H) 0.00 - 0.50 ug/mL FEU    Narrative    This D-dimer assay is intended for use in conjunction with a clinical pretest probability assessment model to exclude pulmonary embolism (PE) and deep venous thrombosis (DVT) in outpatients suspected of PE or DVT. The cut-off value is 0.50 ug/mL FEU.   CBC with platelets and differential   Result Value Ref Range    WBC Count 10.9 4.0 - 11.0 10e3/uL    RBC Count 4.61 4.40 - 5.90 10e6/uL    Hemoglobin 16.2 13.3 - 17.7 g/dL    Hematocrit 45.4 40.0 - 53.0 %    MCV 99 78 - 100 fL    MCH 35.1 (H) 26.5 - 33.0 pg    MCHC 35.7 31.5 - 36.5 g/dL    RDW 12.7 10.0 - 15.0 %    Platelet Count 210 150 - 450 10e3/uL    % Neutrophils 74 %    % Lymphocytes 11 %    % Monocytes 11 %    % Eosinophils 3 %    % Basophils 1 %    % Immature Granulocytes 0 %    NRBCs per 100 WBC 0 <1 /100    Absolute Neutrophils 8.1 1.6 - 8.3 10e3/uL    Absolute Lymphocytes 1.3 0.8 - 5.3 10e3/uL    Absolute Monocytes 1.2 0.0 - 1.3 10e3/uL    Absolute Eosinophils 0.4 0.0 - 0.7 10e3/uL    Absolute Basophils 0.1 0.0 - 0.2 10e3/uL    Absolute Immature Granulocytes 0.0 <=0.0 10e3/uL    Absolute NRBCs 0.0 10e3/uL   XR Chest Port 1 View    Narrative    Procedure:XR CHEST PORT 1 VIEW    Clinical history:Male, 76 years, SOB    Technique: Single view was obtained.    Comparison: 1/22/2021    Findings: The cardiac silhouette is normal. The pulmonary vasculature  is normal.    The  lungs are hyperinflated. There is increased density seen along the  medial aspect of the right hemidiaphragm, partially silhouetting the  right heart border. Bony structures are unremarkable.      Impression    Impression:   Medial right perihilar/ basilar pneumonia.    BRIAN BERMUDEZ MD         SYSTEM ID:  [id number]   CT Chest Pulmonary Embolism w Contrast    Narrative    CT CHEST PULMONARY EMBOLISM W CONTRAST  9/11/2021 9:14 PM    CLINICAL HISTORY: Male, age 76 years,  PE suspected, low/intermediate  prob, positive D-dimer;    Comparison:  CT scan of chest 6/11/2021.    TECHNIQUE:  CT was performed of the chest  with IV contrast.   Sagittal, coronal, axial and MIP reconstructions were reviewed.     FINDINGS:  Chest CT:    Excellent opacification of the pulmonary arteries demonstrates no  evidence of pulmonary embolus. Thoracic aorta is intact and  demonstrate scattered areas of calcified noncalcified atherosclerotic  plaque. No evidence of acute abnormality within the are    The lungs demonstrate severe emphysematous changes,, similar in  severity. There is now consolidation seen in the superior segment of  the right lower lobe. This atelectasis in the dependent portions of  the lungs. A 6 mm pleural-based nodule located posteriorly in the  right lower lobe appears slightly larger.    Thyroid gland and the esophagus are grossly normal. Mediastinal and  hilar lymph nodes is slightly larger than the current examination  without pathologic enlargement.      Visualized portions of the upper abdomen demonstrate a 2 cm low dense  lesion of the right kidney. No lymphadenopathy.    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      Impression    IMPRESSION:   No evidence of acute vascular abnormality. No evidence of pulmonary  embolus or aortic dissection.    Severe emphysematous changes with pneumonia/consolidation and  atelectasis in the right lower lobe.    6  mm to 7 mm pleural-based nodule located posteriorly in the right  lower lobe appears to be increased slightly in size dating back to  6/11/2021. This increase in size may be related to adjacent areas of  atelectasis.    Stable appearance of ununited fracture involving the posterior aspect  of the right 11th rib.    BRIAN BERMUDEZ MD         SYSTEM ID:  [id number]   UA with Microscopic reflex to Culture    Specimen: Urine, Clean Catch   Result Value Ref Range    Color Urine Light Yellow Colorless, Straw, Light Yellow, Yellow    Appearance Urine Clear Clear    Glucose Urine Negative Negative mg/dL    Bilirubin Urine Negative Negative    Ketones Urine Negative Negative mg/dL    Specific Gravity Urine 1.031 (H) 1.000 - 1.030    Blood Urine Negative Negative    pH Urine 6.0 5.0 - 9.0    Protein Albumin Urine Negative Negative mg/dL    Urobilinogen Urine Normal Normal, 2.0 mg/dL    Nitrite Urine Negative Negative    Leukocyte Esterase Urine Negative Negative    Mucus Urine Present (A) None Seen /LPF    RBC Urine <1 <=2 /HPF    WBC Urine 0 <=5 /HPF    Narrative    Urine Culture not indicated       Medications   sodium chloride 0.9% infusion ( Intravenous New Bag 9/11/21 2024)   methylPREDNISolone sodium succinate (solu-MEDROL) injection 125 mg (has no administration in time range)   sodium chloride 0.9% infusion (has no administration in time range)   famotidine (PEPCID) infusion 20 mg (has no administration in time range)   ondansetron (ZOFRAN-ODT) ODT tab 4 mg (has no administration in time range)     Or   ondansetron (ZOFRAN) injection 4 mg (has no administration in time range)   methylPREDNISolone sodium succinate (solu-MEDROL) injection 125 mg (has no administration in time range)   ipratropium - albuterol 0.5 mg/2.5 mg/3 mL (DUONEB) neb solution 3 mL (has no administration in time range)   budesonide (PULMICORT) neb solution 0.5 mg (has no administration in time range)   albuterol (PROVENTIL) neb solution 2.5 mg  (has no administration in time range)   azithromycin 500 mg (ZITHROMAX) in 0.9% NaCl 250 mL intermittent infusion 500 mg (has no administration in time range)   cefTRIAXone in d5w (ROCEPHIN) intermittent infusion 1 g (has no administration in time range)   iopamidol (ISOVUE-370) solution 100 mL (100 mLs Intravenous Given 9/11/21 2119)   cefTRIAXone in d5w (ROCEPHIN) intermittent infusion 1 g (1 g Intravenous New Bag 9/11/21 2209)       Assessments & Plan (with Medical Decision Making)     I have reviewed the nursing notes.    I have reviewed the findings, diagnosis, plan and need for follow up with the patient.       ED to Inpatient Handoff:    Discussed with Dr. Mason at [facility]  Patient accepted for Inpatient Stay  Pending studies include blood cultures  Code Status: Not Addressed           New Prescriptions    No medications on file       Final diagnoses:   Pneumonia of right lower lobe due to infectious organism   COPD exacerbation (H)   Tachypnea   Hypoxia     Low-grade temp 99.4.  Vital signs stable but SaO2 noted at 91% on 2L via nasal cannula.  Tachypneic.  IV established.   His ABG shows a pH of 7.43, PCO2 is 38, PO2 is 57, HCO3 is 25, and his base excess is 0.9 with SaO2 of 91% with an FiO2 of 2 L.  Hypoxic.  CBC shows normal white blood cells no left shift.   Lactic acid is normal.  Blood cultures are pending.  D-dimer is elevated at 1.4.  His magnesium returns decreased at 1.8 and he was given oral replacement.  Chest x-ray shows  Medial right perihilar/ basilar pneumonia.  UA is unremarkable.  Given his elevated D-dimer a CT PE study was performed and this shows No evidence of acute vascular abnormality. No evidence of pulmonary embolus or aortic dissection.  Severe emphysematous changes with pneumonia/consolidation and atelectasis in the right lower lobe. 6 mm to 7 mm pleural-based nodule located posteriorly in the right lower lobe appears to be increased slightly in size dating back to  6/11/2021. This  increase in size may be related to adjacent areas of atelectasis.  Stable appearance of ununited fracture involving the posterior aspect of the right 11th rib.  The patient has oxygen and nebulizers at home.  He was offered hospitalization and would prefer to be hospitalized at this time.  He was started on DuoNebs and Pulmicort.  He was given Rocephin IV.  Discussed this case with Dr. Mason and the patient will be admitted at this time for further medical management and evaluation.  His Covid is pending at this time.             9/11/2021   [facility] PA-C  09/11/21 0825

## 2021-09-13 LAB
ANION GAP SERPL CALCULATED.3IONS-SCNC: 10 MMOL/L (ref 3–14)
BUN SERPL-MCNC: 23 MG/DL (ref 7–25)
CALCIUM SERPL-MCNC: 9.2 MG/DL (ref 8.6–10.3)
CHLORIDE BLD-SCNC: 109 MMOL/L (ref 98–107)
CO2 SERPL-SCNC: 23 MMOL/L (ref 21–31)
CREAT SERPL-MCNC: 0.8 MG/DL (ref 0.7–1.3)
ERYTHROCYTE [DISTWIDTH] IN BLOOD BY AUTOMATED COUNT: 12.7 % (ref 10–15)
GFR SERPL CREATININE-BSD FRML MDRD: 88 ML/MIN/1.73M2
GLUCOSE BLD-MCNC: 99 MG/DL (ref 70–105)
HCT VFR BLD AUTO: 42.5 % (ref 40–53)
HGB BLD-MCNC: 14.6 G/DL (ref 13.3–17.7)
HOLD SPECIMEN: NORMAL
LACTATE SERPL-SCNC: 1.8 MMOL/L (ref 0.7–2)
LACTATE SERPL-SCNC: 2.3 MMOL/L (ref 0.7–2)
MCH RBC QN AUTO: 34.4 PG (ref 26.5–33)
MCHC RBC AUTO-ENTMCNC: 34.4 G/DL (ref 31.5–36.5)
MCV RBC AUTO: 100 FL (ref 78–100)
PLATELET # BLD AUTO: 216 10E3/UL (ref 150–450)
POTASSIUM BLD-SCNC: 3.7 MMOL/L (ref 3.5–5.1)
RBC # BLD AUTO: 4.25 10E6/UL (ref 4.4–5.9)
SODIUM SERPL-SCNC: 142 MMOL/L (ref 134–144)
WBC # BLD AUTO: 12.3 10E3/UL (ref 4–11)

## 2021-09-13 PROCEDURE — 94640 AIRWAY INHALATION TREATMENT: CPT | Mod: 76

## 2021-09-13 PROCEDURE — 85027 COMPLETE CBC AUTOMATED: CPT | Performed by: FAMILY MEDICINE

## 2021-09-13 PROCEDURE — 999N000157 HC STATISTIC RCP TIME EA 10 MIN

## 2021-09-13 PROCEDURE — 258N000003 HC RX IP 258 OP 636: Performed by: FAMILY MEDICINE

## 2021-09-13 PROCEDURE — 80048 BASIC METABOLIC PNL TOTAL CA: CPT | Performed by: FAMILY MEDICINE

## 2021-09-13 PROCEDURE — 99232 SBSQ HOSP IP/OBS MODERATE 35: CPT | Performed by: FAMILY MEDICINE

## 2021-09-13 PROCEDURE — 250N000009 HC RX 250: Performed by: FAMILY MEDICINE

## 2021-09-13 PROCEDURE — 250N000013 HC RX MED GY IP 250 OP 250 PS 637: Performed by: FAMILY MEDICINE

## 2021-09-13 PROCEDURE — 36415 COLL VENOUS BLD VENIPUNCTURE: CPT | Performed by: FAMILY MEDICINE

## 2021-09-13 PROCEDURE — 250N000011 HC RX IP 250 OP 636: Performed by: FAMILY MEDICINE

## 2021-09-13 PROCEDURE — 120N000001 HC R&B MED SURG/OB

## 2021-09-13 PROCEDURE — 94640 AIRWAY INHALATION TREATMENT: CPT

## 2021-09-13 PROCEDURE — 83605 ASSAY OF LACTIC ACID: CPT | Performed by: FAMILY MEDICINE

## 2021-09-13 PROCEDURE — 250N000012 HC RX MED GY IP 250 OP 636 PS 637: Performed by: FAMILY MEDICINE

## 2021-09-13 RX ORDER — NICOTINE 21 MG/24HR
1 PATCH, TRANSDERMAL 24 HOURS TRANSDERMAL DAILY
Status: DISCONTINUED | OUTPATIENT
Start: 2021-09-13 | End: 2021-09-14

## 2021-09-13 RX ADMIN — SODIUM CHLORIDE: 9 INJECTION, SOLUTION INTRAVENOUS at 01:46

## 2021-09-13 RX ADMIN — MAGNESIUM OXIDE TAB 400 MG (241.3 MG ELEMENTAL MG) 400 MG: 400 (241.3 MG) TAB at 07:36

## 2021-09-13 RX ADMIN — BUDESONIDE 0.5 MG: 0.5 INHALANT RESPIRATORY (INHALATION) at 19:05

## 2021-09-13 RX ADMIN — ENOXAPARIN SODIUM 40 MG: 100 INJECTION SUBCUTANEOUS at 09:44

## 2021-09-13 RX ADMIN — BUDESONIDE 0.5 MG: 0.5 INHALANT RESPIRATORY (INHALATION) at 06:41

## 2021-09-13 RX ADMIN — HYDROCHLOROTHIAZIDE 12.5 MG: 12.5 CAPSULE, GELATIN COATED ORAL at 09:44

## 2021-09-13 RX ADMIN — MAGNESIUM OXIDE TAB 400 MG (241.3 MG ELEMENTAL MG) 400 MG: 400 (241.3 MG) TAB at 17:21

## 2021-09-13 RX ADMIN — FAMOTIDINE 20 MG: 20 TABLET ORAL at 21:53

## 2021-09-13 RX ADMIN — LORAZEPAM 1 MG: 0.5 TABLET ORAL at 17:34

## 2021-09-13 RX ADMIN — FAMOTIDINE 20 MG: 20 TABLET ORAL at 09:44

## 2021-09-13 RX ADMIN — IPRATROPIUM BROMIDE AND ALBUTEROL SULFATE 3 ML: 2.5; .5 SOLUTION RESPIRATORY (INHALATION) at 14:35

## 2021-09-13 RX ADMIN — AZITHROMYCIN MONOHYDRATE 250 MG: 250 TABLET ORAL at 09:44

## 2021-09-13 RX ADMIN — LORAZEPAM 1 MG: 0.5 TABLET ORAL at 23:41

## 2021-09-13 RX ADMIN — ACETAMINOPHEN 650 MG: 325 TABLET, FILM COATED ORAL at 21:55

## 2021-09-13 RX ADMIN — LORAZEPAM 0.5 MG: 0.5 TABLET ORAL at 10:40

## 2021-09-13 RX ADMIN — AMLODIPINE BESYLATE 5 MG: 5 TABLET ORAL at 09:44

## 2021-09-13 RX ADMIN — IPRATROPIUM BROMIDE AND ALBUTEROL SULFATE 3 ML: 2.5; .5 SOLUTION RESPIRATORY (INHALATION) at 06:41

## 2021-09-13 RX ADMIN — SODIUM CHLORIDE: 9 INJECTION, SOLUTION INTRAVENOUS at 17:29

## 2021-09-13 RX ADMIN — Medication 1 PATCH: at 09:48

## 2021-09-13 RX ADMIN — PREDNISONE 40 MG: 20 TABLET ORAL at 09:44

## 2021-09-13 RX ADMIN — TAMSULOSIN HYDROCHLORIDE 0.4 MG: 0.4 CAPSULE ORAL at 09:44

## 2021-09-13 RX ADMIN — SODIUM CHLORIDE 500 ML: 9 INJECTION, SOLUTION INTRAVENOUS at 17:28

## 2021-09-13 RX ADMIN — CEFTRIAXONE SODIUM 1 G: 1 INJECTION, SOLUTION INTRAVENOUS at 21:53

## 2021-09-13 RX ADMIN — LISINOPRIL 20 MG: 20 TABLET ORAL at 09:44

## 2021-09-13 RX ADMIN — IPRATROPIUM BROMIDE AND ALBUTEROL SULFATE 3 ML: 2.5; .5 SOLUTION RESPIRATORY (INHALATION) at 19:05

## 2021-09-13 RX ADMIN — IPRATROPIUM BROMIDE AND ALBUTEROL SULFATE 3 ML: 2.5; .5 SOLUTION RESPIRATORY (INHALATION) at 10:10

## 2021-09-13 RX ADMIN — VANCOMYCIN HYDROCHLORIDE 1500 MG: 500 INJECTION, POWDER, LYOPHILIZED, FOR SOLUTION INTRAVENOUS at 15:15

## 2021-09-13 ASSESSMENT — ACTIVITIES OF DAILY LIVING (ADL)
ADLS_ACUITY_SCORE: 17

## 2021-09-13 ASSESSMENT — MIFFLIN-ST. JEOR: SCORE: 1557.74

## 2021-09-13 NOTE — PROGRESS NOTES
SAFETY CHECKLIST  ID Bands and Risk clasps correct and in place (DNR, Fall risk, Allergy, Latex, Limb):  Yes  All Lines Reconciled and labeled correctly: Yes  Whiteboard updated:Yes  Environmental interventions (bed/chair alarm on, call light, side rails, restraints, sitter....): Yes  Verify Tele #: [id number]

## 2021-09-13 NOTE — PLAN OF CARE
"Patient is alert and orientated x4. Reports chronic back pain. Declines interventions. O2 >90% on 2.5 LPM via n/c. Per patient, chronic 2-4 LPM at home. Dyspnea with exertion. Wheezing throughout the lungs. Frequent, productive cough. PRN Ativan administered x1 for anxiousness. Patient has been resting intermittently. SBA/independent in room. Skin intact. VSS. Will continue to monitor. BP (!) 143/74   Pulse 68   Temp 98  F (36.7  C) (Tympanic)   Resp 24   Ht 1.727 m (5' 8\")   Wt 84.3 kg (185 lb 14.4 oz)   SpO2 90%   BMI 28.27 kg/m      "

## 2021-09-13 NOTE — PHARMACY-VANCOMYCIN DOSING SERVICE
Pharmacy Vancomycin Initial Note  Date of Service 2021  Patient's  1945  76 year old, male    Indication: Community Acquired Pneumonia    Current estimated CrCl = Estimated Creatinine Clearance: 85.7 mL/min (based on SCr of 0.8 mg/dL).    Creatinine for last 3 days  2021:  8:25 PM Creatinine 1.01 mg/dL  2021:  6:49 AM Creatinine 0.90 mg/dL  2021:  4:15 AM Creatinine 0.80 mg/dL    Recent Vancomycin Level(s) for last 3 days  No results found for requested labs within last 72 hours.      Vancomycin IV Administrations (past 72 hours)      No vancomycin orders with administrations in past 72 hours.                Nephrotoxins and other renal medications (From now, onward)    Start     Dose/Rate Route Frequency Ordered Stop    21 1400  vancomycin (VANCOCIN) 1,500 mg in sodium chloride 0.9 % 500 mL intermittent infusion      1,500 mg  over 90 Minutes Intravenous ONCE 21 1356      21 1000  lisinopril (ZESTRIL) tablet 20 mg      20 mg Oral DAILY 21 0728            Contrast Orders - past 72 hours (72h ago, onward)    Start     Dose/Rate Route Frequency Ordered Stop    21 2110  iopamidol (ISOVUE-370) solution 100 mL      100 mL Intravenous ONCE 21          Loading dose: 1500 mg at 14:00 2021.  Regimen: Give one time dose now, and reassess in AM, per Dr. Knox. Consider initiating 1000 mg IV every 12 hours pending clinical course, discretion of hospitalist  Start time: 13:57 on 2021  Exposure target: AUC24 (range)400-600 mg/L.hr   AUC24,ss: 521 mg/L.hr  Probability of AUC24 > 400: 77 %  Ctrough,ss: 16.8 mg/L  Probability of Ctrough,ss > 20: 35 %  Probability of nephrotoxicity (Lodise CHANDAN ): 12 %          Plan:  1. Give Vancomycin 1,500 mg x1 dose, and reassess in AM, per Dr. Knox  2. Vancomycin monitoring method: AUC  3. Vancomycin therapeutic monitoring goal: 400-600 mg*h/L  4. Pharmacy will check vancomycin levels  as appropriate in 1-3 Days.    5. Serum creatinine levels will be ordered daily for the first week of therapy and at least twice weekly for subsequent weeks.      Kevin uMnoz RPH

## 2021-09-13 NOTE — PROGRESS NOTES
[facility]    Medicine Progress Note - Hospitalist Service       Date of Admission:  9/11/2021    Assessment & Plan              Brent Villagomez is a 76 year old male admitted on 9/11/2021. He presented from home with concerns of irregular heart rate, but on arrival to the ED was complaining of increased shortness of breath and wheezing, worsening over the past day or so.  Patient has severe COPD, on home oxygen followed by pulmonary at [facility].  In the ED, he had a low-grade temperature and was working harder than baseline with some increased oxygen needs.  Lab work showed a normal CBC, electrolytes and liver tests were normal with a normal lactate but an elevated CRP and elevated D-dimer.  Chest x-ray showed a right perihilar infiltrate.  CT imaging to rule out pulmonary embolism was negative for pulmonary embolism.  Patient has been Covid vaccinated, and Covid testing was negative.  Patient was admitted for the treatment of pneumonia with COPD exacerbation.      Currently patient continues to be short of breath with evidence of acute on chronic COPD as well as community-acquired pneumonia.  Appears euvolemic.  Continue Rocephin/azithromycin.  Sputum culture with gram-positive cocci and rods.  We will add vancomycin and await speciation.  Likely to discharge in 2 to 3 days.      COPD exacerbation (H)  Presenting to ER with increased shortness of breath  History of severe COPD on home oxygen at 2 L  Emergency department with increased oxygen requirements, chest x-ray showing right sided infiltrate, CT imaging not showing pulmonary embolism  Has been treated with steroids, nebs with improvement overnight  Continue antibiotics, continue frequent scheduled nebs and steroids      Pneumonia of right lower lobe due to infectious organism  Present with increased shortness of breath, cough bringing up colored phlegm  Chest x-ray with increase markings consistent with right perihilar or basilar  pneumonia  Has been started on Rocephin/azithromycin for community-acquired pneumonia  Continue for now, procalcitonin elevated yesterday, recheck tomorrow.    Acute on chronic respiratory failure with hypoxia (H)  On home oxygen chronically, continues to require 2 to 3 L.  Monitor, wean back to baseline if possible  Will need ongoing oxygen support at discharge    Hypertension  Controlled at home taking lisinopril/hydrochlorothiazide and Norvasc  Continue home dosing           Diet: Regular Diet Adult    DVT Prophylaxis: Enoxaparin (Lovenox) SQ  Burris Catheter: Not present  Central Lines: None  Code Status: Full Code      Disposition Plan   Expected discharge: Likely 2 to 3 days.     The patient's care was discussed with the patient and his wife.    Rony Knox MD  Hospitalist Service  [facility]  Securely message with the Vocera Web Console (learn more here)  Text page via "[employer] Paging/Directory      Clinically Significant Risk Factors Present on Admission               ______________________________________________________________________    Interval History   Patient reports he does not feel any better than he did yesterday.  Does admit he is slightly improved from admission however.  Reports ongoing shortness of breath.  At rest is mostly comfortable but at times feels anxious.  Ativan helps.  After he receives albuterol nebs feels jittery and then will feel depressed.  With any activity becomes very short of breath and difficult to recover still.  No orthopnea or PND.  Appetite poor.  No fevers or chills overnight.    Data reviewed today: I reviewed all medications, new labs and imaging results over the last 24 hours. I personally reviewed no images or EKG's today.    Physical Exam   Vital Signs: Temp: 97.8  F (36.6  C) Temp src: Tympanic BP: (!) 149/94 Pulse: 76   Resp: 24 SpO2: 95 % O2 Device: Nasal cannula Oxygen Delivery: 2.5 LPM  Weight: 185 lbs 14.4 oz  Constitutional: awake,  alert, cooperative, no apparent distress, and appears stated age  Respiratory: Lung sounds distant.  No wheezing currently.  No rhonchi or consolidation.  Cardiovascular: Regular rate and rhythm.  No murmurs rubs or gallops.  GI: Abdomen is soft, nontender  Neuropsychiatric: General: normal, calm and normal eye contact  Orientation: oriented to self, place, time and situation  Memory and insight: memory for past and recent events intact and thought process normal    Data   Recent Labs   Lab 09/13/21  0415 09/12/21  0649 09/11/21 2025   WBC 12.3* 5.4 10.9   HGB 14.6 16.0 16.2    98 99    200 210    137 139   POTASSIUM 3.7 4.4 4.2   CHLORIDE 109* 106 104   CO2 23 23 27   BUN 23 21 21   CR 0.80 0.90 1.01   ANIONGAP 10 8 8   MARCY 9.2 9.4 9.5   GLC 99 155* 99   ALBUMIN  --  3.8 4.1   PROTTOTAL  --  6.5 6.8   BILITOTAL  --  0.5 0.6   ALKPHOS  --  65 63   ALT  --  27 28   AST  --  23 26     No results found for this or any previous visit (from the past 24 hour(s)).

## 2021-09-13 NOTE — PROGRESS NOTES
"Patient is alert and orientated. Mild complaints of chronic back discomfort, gave prn tylenol. Experienced episode of anxiety prior to bed, gave prn ativan. SOB at rest that worsens with exertion, abdominal muscle use, infrequent congested and productive cough, exp wheezes throughout and coarse crackles to bases with evening assessment, on chronic 2LPM nasal cannula. Heart regular, on tele. Voiding without difficulty, adequate output overnight. VSS. SBA. Will continue to monitor.    /62   Pulse 66   Temp 97.5  F (36.4  C) (Tympanic)   Resp 22   Ht 1.727 m (5' 8\")   Wt 84.3 kg (185 lb 14.4 oz)   SpO2 91%   BMI 28.27 kg/m        "

## 2021-09-13 NOTE — PROGRESS NOTES
SHIFT SUMMARY   Patient remained on 2L nc throughout the shift no changed made patient given morning tx with no adverse reaction noted.o2 is chronic home 02    Miles RT Adi on 9/13/2021 at 6:43 AM

## 2021-09-14 LAB
ANION GAP SERPL CALCULATED.3IONS-SCNC: 10 MMOL/L (ref 3–14)
BACTERIA SPT CULT: ABNORMAL
BUN SERPL-MCNC: 21 MG/DL (ref 7–25)
CALCIUM SERPL-MCNC: 8.8 MG/DL (ref 8.6–10.3)
CHLORIDE BLD-SCNC: 109 MMOL/L (ref 98–107)
CO2 SERPL-SCNC: 24 MMOL/L (ref 21–31)
CREAT SERPL-MCNC: 0.74 MG/DL (ref 0.7–1.3)
ERYTHROCYTE [DISTWIDTH] IN BLOOD BY AUTOMATED COUNT: 12.5 % (ref 10–15)
GFR SERPL CREATININE-BSD FRML MDRD: >90 ML/MIN/1.73M2
GLUCOSE BLD-MCNC: 91 MG/DL (ref 70–105)
GRAM STAIN RESULT: ABNORMAL
HCT VFR BLD AUTO: 41.6 % (ref 40–53)
HGB BLD-MCNC: 14.3 G/DL (ref 13.3–17.7)
HOLD SPECIMEN: NORMAL
MCH RBC QN AUTO: 34.2 PG (ref 26.5–33)
MCHC RBC AUTO-ENTMCNC: 34.4 G/DL (ref 31.5–36.5)
MCV RBC AUTO: 100 FL (ref 78–100)
PLATELET # BLD AUTO: 209 10E3/UL (ref 150–450)
POTASSIUM BLD-SCNC: 3.8 MMOL/L (ref 3.5–5.1)
PROCALCITONIN SERPL-MCNC: 1.11 NG/ML
RBC # BLD AUTO: 4.18 10E6/UL (ref 4.4–5.9)
SODIUM SERPL-SCNC: 143 MMOL/L (ref 134–144)
WBC # BLD AUTO: 8.8 10E3/UL (ref 4–11)

## 2021-09-14 PROCEDURE — 99232 SBSQ HOSP IP/OBS MODERATE 35: CPT | Performed by: FAMILY MEDICINE

## 2021-09-14 PROCEDURE — 94640 AIRWAY INHALATION TREATMENT: CPT | Mod: 76

## 2021-09-14 PROCEDURE — 999N000157 HC STATISTIC RCP TIME EA 10 MIN

## 2021-09-14 PROCEDURE — 258N000003 HC RX IP 258 OP 636: Performed by: FAMILY MEDICINE

## 2021-09-14 PROCEDURE — 250N000009 HC RX 250: Performed by: FAMILY MEDICINE

## 2021-09-14 PROCEDURE — 80048 BASIC METABOLIC PNL TOTAL CA: CPT | Performed by: FAMILY MEDICINE

## 2021-09-14 PROCEDURE — 94640 AIRWAY INHALATION TREATMENT: CPT

## 2021-09-14 PROCEDURE — 250N000013 HC RX MED GY IP 250 OP 250 PS 637: Performed by: FAMILY MEDICINE

## 2021-09-14 PROCEDURE — 36415 COLL VENOUS BLD VENIPUNCTURE: CPT | Performed by: FAMILY MEDICINE

## 2021-09-14 PROCEDURE — 85027 COMPLETE CBC AUTOMATED: CPT | Performed by: FAMILY MEDICINE

## 2021-09-14 PROCEDURE — 250N000012 HC RX MED GY IP 250 OP 636 PS 637: Performed by: FAMILY MEDICINE

## 2021-09-14 PROCEDURE — 120N000001 HC R&B MED SURG/OB

## 2021-09-14 PROCEDURE — 250N000011 HC RX IP 250 OP 636: Performed by: FAMILY MEDICINE

## 2021-09-14 PROCEDURE — 84145 PROCALCITONIN (PCT): CPT | Performed by: FAMILY MEDICINE

## 2021-09-14 RX ORDER — CEFTRIAXONE SODIUM 2 G/50ML
2 INJECTION, SOLUTION INTRAVENOUS EVERY 24 HOURS
Status: DISCONTINUED | OUTPATIENT
Start: 2021-09-14 | End: 2021-09-14

## 2021-09-14 RX ORDER — AZITHROMYCIN 250 MG/1
500 TABLET, FILM COATED ORAL DAILY
Status: DISCONTINUED | OUTPATIENT
Start: 2021-09-14 | End: 2021-09-14 | Stop reason: ALTCHOICE

## 2021-09-14 RX ORDER — NICOTINE 21 MG/24HR
1 PATCH, TRANSDERMAL 24 HOURS TRANSDERMAL DAILY
Status: DISCONTINUED | OUTPATIENT
Start: 2021-09-14 | End: 2021-09-16 | Stop reason: HOSPADM

## 2021-09-14 RX ORDER — AZITHROMYCIN 500 MG/5ML
500 INJECTION, POWDER, LYOPHILIZED, FOR SOLUTION INTRAVENOUS EVERY 24 HOURS
Status: COMPLETED | OUTPATIENT
Start: 2021-09-14 | End: 2021-09-15

## 2021-09-14 RX ORDER — CEFTRIAXONE SODIUM 2 G/50ML
2 INJECTION, SOLUTION INTRAVENOUS EVERY 24 HOURS
Status: DISCONTINUED | OUTPATIENT
Start: 2021-09-14 | End: 2021-09-16 | Stop reason: HOSPADM

## 2021-09-14 RX ADMIN — BUDESONIDE 0.5 MG: 0.5 INHALANT RESPIRATORY (INHALATION) at 06:59

## 2021-09-14 RX ADMIN — ENOXAPARIN SODIUM 40 MG: 100 INJECTION SUBCUTANEOUS at 09:50

## 2021-09-14 RX ADMIN — IPRATROPIUM BROMIDE AND ALBUTEROL SULFATE 3 ML: 2.5; .5 SOLUTION RESPIRATORY (INHALATION) at 18:23

## 2021-09-14 RX ADMIN — AMLODIPINE BESYLATE 5 MG: 5 TABLET ORAL at 09:50

## 2021-09-14 RX ADMIN — CEFTRIAXONE SODIUM 2 G: 2 INJECTION, SOLUTION INTRAVENOUS at 09:52

## 2021-09-14 RX ADMIN — FAMOTIDINE 20 MG: 20 TABLET ORAL at 21:12

## 2021-09-14 RX ADMIN — VANCOMYCIN HYDROCHLORIDE 1000 MG: 1 INJECTION, POWDER, LYOPHILIZED, FOR SOLUTION INTRAVENOUS at 10:42

## 2021-09-14 RX ADMIN — AZITHROMYCIN MONOHYDRATE 500 MG: 500 INJECTION, POWDER, LYOPHILIZED, FOR SOLUTION INTRAVENOUS at 12:57

## 2021-09-14 RX ADMIN — LISINOPRIL 20 MG: 20 TABLET ORAL at 09:49

## 2021-09-14 RX ADMIN — MAGNESIUM OXIDE TAB 400 MG (241.3 MG ELEMENTAL MG) 400 MG: 400 (241.3 MG) TAB at 07:49

## 2021-09-14 RX ADMIN — ACETAMINOPHEN 650 MG: 325 TABLET, FILM COATED ORAL at 21:17

## 2021-09-14 RX ADMIN — LORAZEPAM 1 MG: 0.5 TABLET ORAL at 15:10

## 2021-09-14 RX ADMIN — BUDESONIDE 0.5 MG: 0.5 INHALANT RESPIRATORY (INHALATION) at 18:23

## 2021-09-14 RX ADMIN — MAGNESIUM OXIDE TAB 400 MG (241.3 MG ELEMENTAL MG) 400 MG: 400 (241.3 MG) TAB at 17:50

## 2021-09-14 RX ADMIN — NICOTINE 1 PATCH: 21 PATCH, EXTENDED RELEASE TRANSDERMAL at 09:50

## 2021-09-14 RX ADMIN — VANCOMYCIN HYDROCHLORIDE 1000 MG: 1 INJECTION, POWDER, LYOPHILIZED, FOR SOLUTION INTRAVENOUS at 21:12

## 2021-09-14 RX ADMIN — PREDNISONE 40 MG: 20 TABLET ORAL at 09:49

## 2021-09-14 RX ADMIN — IPRATROPIUM BROMIDE AND ALBUTEROL SULFATE 3 ML: 2.5; .5 SOLUTION RESPIRATORY (INHALATION) at 10:02

## 2021-09-14 RX ADMIN — HYDROCHLOROTHIAZIDE 12.5 MG: 12.5 CAPSULE, GELATIN COATED ORAL at 09:50

## 2021-09-14 RX ADMIN — IPRATROPIUM BROMIDE AND ALBUTEROL SULFATE 3 ML: 2.5; .5 SOLUTION RESPIRATORY (INHALATION) at 14:40

## 2021-09-14 RX ADMIN — IPRATROPIUM BROMIDE AND ALBUTEROL SULFATE 3 ML: 2.5; .5 SOLUTION RESPIRATORY (INHALATION) at 06:59

## 2021-09-14 RX ADMIN — FAMOTIDINE 20 MG: 20 TABLET ORAL at 09:49

## 2021-09-14 RX ADMIN — TAMSULOSIN HYDROCHLORIDE 0.4 MG: 0.4 CAPSULE ORAL at 09:50

## 2021-09-14 RX ADMIN — LORAZEPAM 1 MG: 0.5 TABLET ORAL at 07:49

## 2021-09-14 RX ADMIN — LORAZEPAM 1 MG: 0.5 TABLET ORAL at 21:17

## 2021-09-14 ASSESSMENT — ACTIVITIES OF DAILY LIVING (ADL)
ADLS_ACUITY_SCORE: 15
ADLS_ACUITY_SCORE: 17

## 2021-09-14 NOTE — PLAN OF CARE
Patient is pleasant. Vitals are stable. Patient remains on 2.5LPM of oxygen.  Patient has expiratory wheezes throughout his lungs and crackles in both bases. Patient complained of chronic back pain and asked for tylenol. Patient also asked if he could have some ativan prior to bedtime. No new concerns at this time. Will continue to  monitor.  Rolan Mei RN on 9/14/2021 at 1:51 AM

## 2021-09-14 NOTE — PROGRESS NOTES
[facility]    Medicine Progress Note - Hospitalist Service       Date of Admission:  9/11/2021    Assessment & Plan                 Brent Villagomez is a 76 year old male admitted on 9/11/2021. He presented from home with concerns of irregular heart rate, but on arrival to the ED was complaining of increased shortness of breath and wheezing, worsening over the past day or so.  Patient has severe COPD, on home oxygen followed by pulmonary at [facility].  In the ED, he had a low-grade temperature and was working harder than baseline with some increased oxygen needs.  Lab work showed a normal CBC, electrolytes and liver tests were normal with a normal lactate but an elevated CRP and elevated D-dimer.  Chest x-ray showed a right perihilar infiltrate.  CT imaging to rule out pulmonary embolism was negative for pulmonary embolism.  Patient has been Covid vaccinated, and Covid testing was negative.  Patient was admitted for the treatment of pneumonia with COPD exacerbation.      Currently patient continues to be short of breath with evidence of acute on chronic COPD as well as community-acquired pneumonia.  Appears euvolemic.  Continue Rocephin/azithromycin.  Sputum culture with gram-positive cocci and rods.  We will add vancomycin and await speciation.  Likely to discharge in 2 to 3 days.      COPD exacerbation (H)  Presenting to ER with increased shortness of breath  History of severe COPD on home oxygen at 2 L  Emergency department with increased oxygen requirements, chest x-ray showing right sided infiltrate, CT imaging not showing pulmonary embolism  Has been treated with steroids, nebs continues to have slow improvement.  Continue antibiotics, continue frequent scheduled nebs and steroids      Pneumonia of right lower lobe due to infectious organism  Present with increased shortness of breath, cough bringing up colored phlegm  Chest x-ray with increase markings consistent with right perihilar or  "basilar pneumonia  Has been started on Rocephin/azithromycin for community-acquired pneumonia  Vancomycin added given sputum culture with positive cocci and slow clinical improvement.    Acute on chronic respiratory failure with hypoxia (H)  On home oxygen chronically, continues to require 2 to 3 L.  Monitor, wean back to baseline if possible  Will need ongoing oxygen support at discharge    Hypertension  Controlled at home taking lisinopril/hydrochlorothiazide and Norvasc  Continue home dosing             Diet: Regular Diet Adult    DVT Prophylaxis: Enoxaparin (Lovenox) SQ  Burris Catheter: Not present  Central Lines: None  Code Status: Full Code      Disposition Plan   Expected discharge: Likely 2d     The patient's care was discussed with the Patient and Patient's Family.    Rony Knox MD  Hospitalist Service  [facility]  Securely message with the Vocera Web Console (learn more here)  Text page via [employer] Paging/Directory      Clinically Significant Risk Factors Present on Admission               ______________________________________________________________________    Interval History   Reports that he feels \"a little better\".  Definitely not worse.  No fevers or chills overnight.  Continues with nonproductive cough.  Feels short of breath with any activity.  At rest fairly comfortable.  No chest pain.  Eating and drinking okay.  No new issues.    Data reviewed today: I reviewed all medications, new labs and imaging results over the last 24 hours. I personally reviewed no images or EKG's today.    Physical Exam   Vital Signs: Temp: 97.1  F (36.2  C) Temp src: Tympanic BP: 129/75 Pulse: 71   Resp: 22 SpO2: 92 % O2 Device: Nasal cannula Oxygen Delivery: 2.5 LPM  Weight: 185 lbs 14.4 oz  Constitutional: awake, alert, cooperative, no apparent distress, and appears stated age  Respiratory: No increased work of breathing but lung sounds distant.  No wheezing.  No significant change from " yesterday.  Cardiovascular: Regular rate and rhythm.  No murmurs rubs or gallops heard.   GI: Abdomen Soft, non-tender.  No masses, rebound or guarding.   Musculoskeletal: No synovitis.  Muscle strength age and body habitus appropriateas well as equal and even.   Neuropsychiatric: General: normal, calm and normal eye contact  Orientation: oriented to self, place, time and situation  Memory and insight: memory for past and recent events intact and thought process normal    Data   Recent Labs   Lab 09/14/21  0447 09/13/21  0415 09/12/21  0649 09/11/21 2025   WBC 8.8 12.3* 5.4 10.9   HGB 14.3 14.6 16.0 16.2    100 98 99    216 200 210    142 137 139   POTASSIUM 3.8 3.7 4.4 4.2   CHLORIDE 109* 109* 106 104   CO2 24 23 23 27   BUN 21 23 21 21   CR 0.74 0.80 0.90 1.01   ANIONGAP 10 10 8 8   MARCY 8.8 9.2 9.4 9.5   GLC 91 99 155* 99   ALBUMIN  --   --  3.8 4.1   PROTTOTAL  --   --  6.5 6.8   BILITOTAL  --   --  0.5 0.6   ALKPHOS  --   --  65 63   ALT  --   --  27 28   AST  --   --  23 26     No results found for this or any previous visit (from the past 24 hour(s)).

## 2021-09-14 NOTE — PROGRESS NOTES
SAFETY CHECKLIST  ID Bands and Risk clasps correct and in place (DNR, Fall risk, Allergy, Latex, Limb):  Yes  All Lines Reconciled and labeled correctly: Yes  Whiteboard updated:Yes  Environmental interventions (bed/chair alarm on, call light, side rails, restraints, sitter....): Yes  Verify Tele #:  4

## 2021-09-14 NOTE — PHARMACY-VANCOMYCIN DOSING SERVICE
Pharmacy Vancomycin Note  Date of Service 2021  Patient's  1945   76 year old, male    Indication: Community Acquired Pneumonia  Day of Therapy: 2  Current vancomycin regimen:   Patient received Vancomycin  Current vancomycin monitoring method: AUC  Current vancomycin therapeutic monitoring goal: 400-600 mg*h/L    Current estimated CrCl = Estimated Creatinine Clearance: 92.7 mL/min (based on SCr of 0.74 mg/dL).    Creatinine for last 3 days  2021:  8:25 PM Creatinine 1.01 mg/dL  2021:  6:49 AM Creatinine 0.90 mg/dL  2021:  4:15 AM Creatinine 0.80 mg/dL  2021:  4:47 AM Creatinine 0.74 mg/dL    Recent Vancomycin Levels (past 3 days)  No results found for requested labs within last 72 hours.    Vancomycin IV Administrations (past 72 hours)                   vancomycin (VANCOCIN) 1,500 mg in sodium chloride 0.9 % 500 mL intermittent infusion (mg) 1,500 mg New Bag 21 1515                Nephrotoxins and other renal medications (From now, onward)    Start     Dose/Rate Route Frequency Ordered Stop    21 0900  vancomycin (VANCOCIN) 1,000 mg in sodium chloride 0.9 % 250 mL intermittent infusion      1,000 mg  over 90 Minutes Intravenous EVERY 12 HOURS 21 0834      21 1000  lisinopril (ZESTRIL) tablet 20 mg      20 mg Oral DAILY 21 0728               Contrast Orders - past 72 hours (72h ago, onward)    Start     Dose/Rate Route Frequency Ordered Stop    21 2110  iopamidol (ISOVUE-370) solution 100 mL      100 mL Intravenous ONCE 21          Has serum creatinine changed greater than 50% in last 72 hours: No    Urine output:  good urine output    Renal Function: Stable    Loading dose: 1,500 mg x1 on   Regimen: 1000 mg IV every 12 hours.  Start time: 08:32 on 2021  Exposure target: AUC24 (range)400-600 mg/L.hr   AUC24,ss: 490 mg/L.hr  Probability of AUC24 > 400: 71 %  Ctrough,ss: 15.5 mg/L  Probability of Ctrough,ss  > 20: 29 %  Probability of nephrotoxicity (Lodise CHANDAN 2009): 11 %      Plan:  1. Initiate Vancomycin 1,000 mg Q12H  2. Vancomycin monitoring method: AUC  3. Vancomycin therapeutic monitoring goal: 400-600 mg*h/L  4. Pharmacy will check vancomycin levels as appropriate in 1-3 Days.  5. Serum creatinine levels will be ordered daily for the first week of therapy and at least twice weekly for subsequent weeks.    Kevin Munoz RPH

## 2021-09-14 NOTE — PLAN OF CARE
Patient denies pain. VSS. Wheezing throughout lungs. Frequent congested cough. O2 stable on 2.5 LPM via n/c. Chronic 2-4 LPM at home. Ambulated to bathroom SBA/independent. Scattered bruises on upper extremities. PRN Ativan administered per MAR for anxiousness. Patient resting at this time.     Temp: 97.5  F (36.4  C) Temp src: Tympanic BP: 135/69 Pulse: 83   Resp: 24 SpO2: 91 % O2 Device: Nasal cannula Oxygen Delivery: 2.5 LPM

## 2021-09-15 LAB
ANION GAP SERPL CALCULATED.3IONS-SCNC: 9 MMOL/L (ref 3–14)
BUN SERPL-MCNC: 19 MG/DL (ref 7–25)
CALCIUM SERPL-MCNC: 8.8 MG/DL (ref 8.6–10.3)
CHLORIDE BLD-SCNC: 107 MMOL/L (ref 98–107)
CO2 SERPL-SCNC: 23 MMOL/L (ref 21–31)
CREAT SERPL-MCNC: 0.79 MG/DL (ref 0.7–1.3)
CREAT SERPL-MCNC: 0.8 MG/DL (ref 0.7–1.3)
GFR SERPL CREATININE-BSD FRML MDRD: 88 ML/MIN/1.73M2
GFR SERPL CREATININE-BSD FRML MDRD: 88 ML/MIN/1.73M2
GLUCOSE BLD-MCNC: 87 MG/DL (ref 70–105)
HOLD SPECIMEN: NORMAL
HOLD SPECIMEN: NORMAL
PLATELET # BLD AUTO: 209 10E3/UL (ref 150–450)
POTASSIUM BLD-SCNC: 3.7 MMOL/L (ref 3.5–5.1)
SODIUM SERPL-SCNC: 139 MMOL/L (ref 134–144)

## 2021-09-15 PROCEDURE — 94640 AIRWAY INHALATION TREATMENT: CPT | Mod: 76

## 2021-09-15 PROCEDURE — 258N000003 HC RX IP 258 OP 636: Performed by: FAMILY MEDICINE

## 2021-09-15 PROCEDURE — 250N000013 HC RX MED GY IP 250 OP 250 PS 637: Performed by: FAMILY MEDICINE

## 2021-09-15 PROCEDURE — 94640 AIRWAY INHALATION TREATMENT: CPT

## 2021-09-15 PROCEDURE — 999N000157 HC STATISTIC RCP TIME EA 10 MIN

## 2021-09-15 PROCEDURE — 250N000012 HC RX MED GY IP 250 OP 636 PS 637: Performed by: FAMILY MEDICINE

## 2021-09-15 PROCEDURE — 120N000001 HC R&B MED SURG/OB

## 2021-09-15 PROCEDURE — 250N000011 HC RX IP 250 OP 636: Performed by: FAMILY MEDICINE

## 2021-09-15 PROCEDURE — 80048 BASIC METABOLIC PNL TOTAL CA: CPT | Performed by: FAMILY MEDICINE

## 2021-09-15 PROCEDURE — 250N000009 HC RX 250: Performed by: FAMILY MEDICINE

## 2021-09-15 PROCEDURE — 99232 SBSQ HOSP IP/OBS MODERATE 35: CPT | Performed by: FAMILY MEDICINE

## 2021-09-15 PROCEDURE — 82565 ASSAY OF CREATININE: CPT | Performed by: FAMILY MEDICINE

## 2021-09-15 PROCEDURE — 85049 AUTOMATED PLATELET COUNT: CPT | Performed by: FAMILY MEDICINE

## 2021-09-15 PROCEDURE — 36415 COLL VENOUS BLD VENIPUNCTURE: CPT | Performed by: FAMILY MEDICINE

## 2021-09-15 RX ADMIN — IPRATROPIUM BROMIDE AND ALBUTEROL SULFATE 3 ML: 2.5; .5 SOLUTION RESPIRATORY (INHALATION) at 15:16

## 2021-09-15 RX ADMIN — MAGNESIUM OXIDE TAB 400 MG (241.3 MG ELEMENTAL MG) 400 MG: 400 (241.3 MG) TAB at 17:22

## 2021-09-15 RX ADMIN — LORAZEPAM 1 MG: 0.5 TABLET ORAL at 20:21

## 2021-09-15 RX ADMIN — IPRATROPIUM BROMIDE AND ALBUTEROL SULFATE 3 ML: 2.5; .5 SOLUTION RESPIRATORY (INHALATION) at 10:29

## 2021-09-15 RX ADMIN — IPRATROPIUM BROMIDE AND ALBUTEROL SULFATE 3 ML: 2.5; .5 SOLUTION RESPIRATORY (INHALATION) at 18:22

## 2021-09-15 RX ADMIN — TAMSULOSIN HYDROCHLORIDE 0.4 MG: 0.4 CAPSULE ORAL at 10:04

## 2021-09-15 RX ADMIN — LORAZEPAM 1 MG: 0.5 TABLET ORAL at 08:47

## 2021-09-15 RX ADMIN — BUDESONIDE 0.5 MG: 0.5 INHALANT RESPIRATORY (INHALATION) at 06:48

## 2021-09-15 RX ADMIN — LORAZEPAM 1 MG: 0.5 TABLET ORAL at 14:42

## 2021-09-15 RX ADMIN — VANCOMYCIN HYDROCHLORIDE 1000 MG: 1 INJECTION, POWDER, LYOPHILIZED, FOR SOLUTION INTRAVENOUS at 08:41

## 2021-09-15 RX ADMIN — BUDESONIDE 0.5 MG: 0.5 INHALANT RESPIRATORY (INHALATION) at 18:22

## 2021-09-15 RX ADMIN — PREDNISONE 40 MG: 20 TABLET ORAL at 10:07

## 2021-09-15 RX ADMIN — HYDROCHLOROTHIAZIDE 12.5 MG: 12.5 CAPSULE, GELATIN COATED ORAL at 10:04

## 2021-09-15 RX ADMIN — FAMOTIDINE 20 MG: 20 TABLET ORAL at 10:04

## 2021-09-15 RX ADMIN — ACETAMINOPHEN 650 MG: 325 TABLET, FILM COATED ORAL at 21:27

## 2021-09-15 RX ADMIN — SODIUM CHLORIDE: 9 INJECTION, SOLUTION INTRAVENOUS at 20:46

## 2021-09-15 RX ADMIN — MAGNESIUM OXIDE TAB 400 MG (241.3 MG ELEMENTAL MG) 400 MG: 400 (241.3 MG) TAB at 08:41

## 2021-09-15 RX ADMIN — LISINOPRIL 20 MG: 20 TABLET ORAL at 10:04

## 2021-09-15 RX ADMIN — NICOTINE 1 PATCH: 21 PATCH, EXTENDED RELEASE TRANSDERMAL at 10:04

## 2021-09-15 RX ADMIN — AMLODIPINE BESYLATE 5 MG: 5 TABLET ORAL at 10:04

## 2021-09-15 RX ADMIN — AZITHROMYCIN MONOHYDRATE 500 MG: 500 INJECTION, POWDER, LYOPHILIZED, FOR SOLUTION INTRAVENOUS at 11:21

## 2021-09-15 RX ADMIN — FAMOTIDINE 20 MG: 20 TABLET ORAL at 21:27

## 2021-09-15 RX ADMIN — ENOXAPARIN SODIUM 40 MG: 100 INJECTION SUBCUTANEOUS at 10:04

## 2021-09-15 RX ADMIN — CEFTRIAXONE SODIUM 2 G: 2 INJECTION, SOLUTION INTRAVENOUS at 10:31

## 2021-09-15 RX ADMIN — IPRATROPIUM BROMIDE AND ALBUTEROL SULFATE 3 ML: 2.5; .5 SOLUTION RESPIRATORY (INHALATION) at 06:48

## 2021-09-15 ASSESSMENT — ACTIVITIES OF DAILY LIVING (ADL)
ADLS_ACUITY_SCORE: 15

## 2021-09-15 NOTE — PROGRESS NOTES
Shift Summary:  Pt remained on 2.5L NC with SpO2 92%. Scheduled tx given. No further respiratory interventions done. Shahida Recio RRT

## 2021-09-15 NOTE — PLAN OF CARE
Patient is pleasant. Vitals are stable. Patient remains on 2.5L of oxygen. Patient lungs have expiratory wheezes throughout. Patient stated that he feels better and feels he can breath a little better than he did yesterday. No new concerns at this time. Will continue to monitor.   Rolan Mei RN on 9/15/2021 at 12:25 AM

## 2021-09-15 NOTE — PROGRESS NOTES
[facility]    Medicine Progress Note - Hospitalist Service       Date of Admission:  9/11/2021    Assessment & Plan           Brent Villagomez is a 76 year old male admitted on 9/11/2021. He presented from home with concerns of irregular heart rate, but on arrival to the ED was complaining of increased shortness of breath and wheezing, worsening over the past day or so.  Patient has severe COPD, on home oxygen followed by pulmonary at [facility].  In the ED, he had a low-grade temperature and was working harder than baseline with some increased oxygen needs.  Lab work showed a normal CBC, electrolytes and liver tests were normal with a normal lactate but an elevated CRP and elevated D-dimer.  Chest x-ray showed a right perihilar infiltrate.  CT imaging to rule out pulmonary embolism was negative for pulmonary embolism.  Patient has been Covid vaccinated, and Covid testing was negative.  Patient was admitted for the treatment of pneumonia with COPD exacerbation.      Currently patient continues to be short of breath with evidence of acute on chronic COPD as well as community-acquired pneumonia.  Appears euvolemic.  He has however improved substantially over the last 24 hours.    Continue Rocephin/azithromycin.  Sputum culture showed gram-positive cocci and rods but ended up with normal shavon.  We did add vancomycin but will discontinue that today.  I suspect he will likely discharge in 2 days.      COPD exacerbation (H)  Presenting to ER with increased shortness of breath  History of severe COPD on home oxygen at 2 L  Emergency department with increased oxygen requirements, chest x-ray showing right sided infiltrate, CT imaging not showing pulmonary embolism  Has been treated with steroids, nebs  Continue antibiotics, continue frequent scheduled nebs and steroids  Today with more substantial improvement in respiratory status      Pneumonia of right lower lobe due to infectious organism  Present with  increased shortness of breath, cough bringing up colored phlegm  Chest x-ray with increase markings consistent with right perihilar or basilar pneumonia  Has been started on Rocephin/azithromycin for community-acquired pneumonia  Discontinue vancomycin.    Acute on chronic respiratory failure with hypoxia (H)  On home oxygen chronically, continues to require 2 to 3 L.  Monitor, wean back to baseline if possible  Will need ongoing oxygen support at discharge    Hypertension  Controlled at home taking lisinopril/hydrochlorothiazide and Norvasc  Continue home dosing         Diet: Regular Diet Adult    DVT Prophylaxis: Enoxaparin (Lovenox) SQ  Burris Catheter: Not present  Central Lines: None  Code Status: Full Code      Disposition Plan   Expected discharge: Likely to discharge to home in 1 to 2 days.     The patient's care was discussed with the Patient.    Rony Knox MD  Hospitalist Service  [facility]  Securely message with the Vocera Web Console (learn more here)  Text page via [employer] Paging/Directory      Clinically Significant Risk Factors Present on Admission               ______________________________________________________________________    Interval History   Patient reports significant improvement in breathing since yesterday.  He is now comfortable sitting in bed.  Is able to move around the bed as well and sit up without becoming short of breath.  Still very short of breath with any activity out of bed.  Has to stop after a couple of steps as he starts to feel short of breath and dizzy.  Denies any chest pain.  No fevers or chills.  Cough remains largely nonproductive.  No new issues.    Data reviewed today: I reviewed all medications, new labs and imaging results over the last 24 hours. I personally reviewed no images or EKG's today.    Physical Exam   Vital Signs: Temp: 98.5  F (36.9  C) Temp src: Tympanic BP: (!) 153/86 Pulse: 77   Resp: 18 SpO2: 91 % O2 Device: None (Room  air) Oxygen Delivery: 2.5 LPM  Weight: 185 lbs 14.4 oz  Constitutional: awake, alert, cooperative, no apparent distress, and appears stated age  Respiratory: Lung sounds distant but much better aeration than yesterday.  Scattered rhonchi.  Cardiovascular: Regular rate and rhythm.  No murmurs rubs or gallops.  GI: Abdomen soft, nontender  Musculoskeletal: No synovitis.  Muscle strength age and body habitus appropriateas well as equal and even.   Neuropsychiatric: General: normal, calm and normal eye contact  Orientation: oriented to self, place, time and situation  Memory and insight: memory for past and recent events intact and thought process normal    Data   Recent Labs   Lab 09/15/21  0455 09/14/21  0447 09/13/21  0415 09/12/21  0649 09/12/21  0649 09/11/21 2025 09/11/21 2025   WBC  --  8.8 12.3*  --  5.4   < > 10.9   HGB  --  14.3 14.6  --  16.0   < > 16.2   MCV  --  100 100  --  98   < > 99    209 216   < > 200   < > 210    143 142   < > 137   < > 139   POTASSIUM 3.7 3.8 3.7   < > 4.4   < > 4.2   CHLORIDE 107 109* 109*   < > 106   < > 104   CO2 23 24 23   < > 23   < > 27   BUN 19 21 23   < > 21   < > 21   CR 0.79  0.80 0.74 0.80   < > 0.90   < > 1.01   ANIONGAP 9 10 10   < > 8   < > 8   MARCY 8.8 8.8 9.2   < > 9.4   < > 9.5   GLC 87 91 99   < > 155*   < > 99   ALBUMIN  --   --   --   --  3.8  --  4.1   PROTTOTAL  --   --   --   --  6.5  --  6.8   BILITOTAL  --   --   --   --  0.5  --  0.6   ALKPHOS  --   --   --   --  65  --  63   ALT  --   --   --   --  27  --  28   AST  --   --   --   --  23  --  26    < > = values in this interval not displayed.     No results found for this or any previous visit (from the past 24 hour(s)).

## 2021-09-15 NOTE — PROGRESS NOTES
Pt had no co of pain thru out the day. Pt was able to rest this afternoon and reported feeling rested. Tele discontinued per MD orders.

## 2021-09-16 VITALS
HEART RATE: 74 BPM | HEIGHT: 68 IN | DIASTOLIC BLOOD PRESSURE: 71 MMHG | BODY MASS INDEX: 28.17 KG/M2 | WEIGHT: 185.9 LBS | SYSTOLIC BLOOD PRESSURE: 136 MMHG | OXYGEN SATURATION: 93 % | TEMPERATURE: 98.1 F | RESPIRATION RATE: 18 BRPM

## 2021-09-16 PROCEDURE — 250N000009 HC RX 250: Performed by: FAMILY MEDICINE

## 2021-09-16 PROCEDURE — 250N000013 HC RX MED GY IP 250 OP 250 PS 637: Performed by: FAMILY MEDICINE

## 2021-09-16 PROCEDURE — 250N000011 HC RX IP 250 OP 636: Performed by: FAMILY MEDICINE

## 2021-09-16 PROCEDURE — 94640 AIRWAY INHALATION TREATMENT: CPT

## 2021-09-16 PROCEDURE — 999N000157 HC STATISTIC RCP TIME EA 10 MIN

## 2021-09-16 PROCEDURE — 94640 AIRWAY INHALATION TREATMENT: CPT | Mod: 76

## 2021-09-16 PROCEDURE — 250N000012 HC RX MED GY IP 250 OP 636 PS 637: Performed by: FAMILY MEDICINE

## 2021-09-16 PROCEDURE — 99239 HOSP IP/OBS DSCHRG MGMT >30: CPT | Performed by: FAMILY MEDICINE

## 2021-09-16 RX ORDER — CEFUROXIME AXETIL 500 MG/1
500 TABLET ORAL 2 TIMES DAILY
Qty: 6 TABLET | Refills: 0 | Status: SHIPPED | OUTPATIENT
Start: 2021-09-16 | End: 2021-09-19

## 2021-09-16 RX ORDER — PREDNISONE 20 MG/1
TABLET ORAL
Qty: 18 TABLET | Refills: 0 | Status: SHIPPED | OUTPATIENT
Start: 2021-09-16 | End: 2021-10-01

## 2021-09-16 RX ADMIN — HYDROCHLOROTHIAZIDE 12.5 MG: 12.5 CAPSULE, GELATIN COATED ORAL at 10:20

## 2021-09-16 RX ADMIN — LISINOPRIL 20 MG: 20 TABLET ORAL at 10:20

## 2021-09-16 RX ADMIN — MAGNESIUM OXIDE TAB 400 MG (241.3 MG ELEMENTAL MG) 400 MG: 400 (241.3 MG) TAB at 07:35

## 2021-09-16 RX ADMIN — BUDESONIDE 0.5 MG: 0.5 INHALANT RESPIRATORY (INHALATION) at 06:29

## 2021-09-16 RX ADMIN — FAMOTIDINE 20 MG: 20 TABLET ORAL at 10:20

## 2021-09-16 RX ADMIN — CEFTRIAXONE SODIUM 2 G: 2 INJECTION, SOLUTION INTRAVENOUS at 10:19

## 2021-09-16 RX ADMIN — AMLODIPINE BESYLATE 5 MG: 5 TABLET ORAL at 10:20

## 2021-09-16 RX ADMIN — IPRATROPIUM BROMIDE AND ALBUTEROL SULFATE 3 ML: 2.5; .5 SOLUTION RESPIRATORY (INHALATION) at 06:29

## 2021-09-16 RX ADMIN — TAMSULOSIN HYDROCHLORIDE 0.4 MG: 0.4 CAPSULE ORAL at 10:20

## 2021-09-16 RX ADMIN — LORAZEPAM 1 MG: 0.5 TABLET ORAL at 07:35

## 2021-09-16 RX ADMIN — IPRATROPIUM BROMIDE AND ALBUTEROL SULFATE 3 ML: 2.5; .5 SOLUTION RESPIRATORY (INHALATION) at 10:37

## 2021-09-16 RX ADMIN — PREDNISONE 40 MG: 20 TABLET ORAL at 10:20

## 2021-09-16 RX ADMIN — ENOXAPARIN SODIUM 40 MG: 100 INJECTION SUBCUTANEOUS at 10:19

## 2021-09-16 ASSESSMENT — ACTIVITIES OF DAILY LIVING (ADL)
ADLS_ACUITY_SCORE: 15
ADLS_ACUITY_SCORE: 4

## 2021-09-16 NOTE — PROGRESS NOTES
NSG DISCHARGE NOTE    Patient discharged to home at 1:19 PM via wheel chair. Accompanied by spouse and staff. Discharge instructions reviewed with patient and spouse, opportunity offered to ask questions. Prescriptions sent to patients preferred pharmacy. All belongings sent with patient.    Antonette Mccann RN

## 2021-09-16 NOTE — DISCHARGE SUMMARY
[facility]  Hospitalist Discharge Summary      Date of Admission:  9/11/2021  Date of Discharge:  9/16/2021  Discharging Provider: Rony Knox MD      Discharge Diagnoses   Acute on chronic respiratory failure due to  COPD exacerbation - POA and improved  Community-acquired pneumonia right upper lobe -POA and improved    Follow-ups Needed After Discharge   Follow-up Appointments     Follow-up and recommended labs and tests       Follow up with primary care provider, Dr. Proctor on 9/23 for hospital   follow- up.             Unresulted Labs Ordered in the Past 30 Days of this Admission     Date and Time Order Name Status Description    9/11/2021  7:57 PM Blood Culture Line, venous In process     9/11/2021  7:57 PM Blood Culture Arm, Left In process       These results will be followed up by NA    Discharge Disposition   Discharged to home  Condition at discharge: Stable      Hospital Course              Brent Villagomez is a 76 year old male admitted on 9/11/2021. He presented from home with concerns of irregular heart rate, but on arrival to the ED was complaining of increased shortness of breath and wheezing, worsening over the past day or so.  Patient has severe COPD, on home oxygen followed by pulmonary at [facility].  In the ED, he had a low-grade temperature and was working harder than baseline with some increased oxygen needs.  Lab work showed a normal CBC, electrolytes and liver tests were normal with a normal lactate but an elevated CRP and elevated D-dimer.  Chest x-ray showed a right perihilar infiltrate.  CT imaging to rule out pulmonary embolism was negative for pulmonary embolism.  Patient has been Covid vaccinated, and Covid testing was negative.  Patient was admitted for the treatment of pneumonia with COPD exacerbation.      Patient was started on Rocephin and azithromycin.  Initially his sputum culture showed gram-positive cocci and he had made little progress in respiratory  status so vancomycin was started.  It was then discontinued when the culture resulted with normal shavon.  Patient continued to improve and over the last 48 hours has had significant improvement in his functional status.  He was able to ambulate around the room with only minimal shortness of breath on his home oxygen requirement of 2 L.    On the day of discharge patient felt well and essentially at baseline.  He wanted to return home.  He will be continued on a prednisone taper for 15 days duration.  He has completed 5 days of azithromycin and will remain on Ceftin for an additional 3 days to complete 10 days of cephalosporin.  I set him up for a follow-up appointment with Dr. Proctor regarding chronic management of his COPD.    Currently patient continues to be short of breath with evidence of acute on chronic COPD as well as community-acquired pneumonia.  Appears euvolemic.  He has however improved substantially over the last 24 hours.    Continue Rocephin/azithromycin.  Sputum culture showed gram-positive cocci and rods but ended up with normal shavon.  We did add vancomycin but will discontinue that today.  I suspect he will likely discharge in 2 days.      Patient appears to be euvolemic over the past 3 days and will continue on his home medications.        Consultations This Hospital Stay   PHARMACY TO DOSE VANCO  PHARMACY TO DOSE VANCO    Code Status   Full Code    Time Spent on this Encounter   I, Rony Knox MD, personally saw the patient today and spent greater than 30 minutes discharging this patient.       Rony Knox MD  [facility]  [address]  [facility] [address]  Phone: [phone]  Fax: [phone]  ______________________________________________________________________    Physical Exam   Vital Signs: Temp: 98.1  F (36.7  C) Temp src: Tympanic BP: 136/71 Pulse: 74   Resp: 18 SpO2: 93 % O2 Device: Nasal cannula Oxygen Delivery: 2 LPM  Weight: 185 lbs  14.4 oz  Constitutional: awake, alert, cooperative, no apparent distress, and appears stated age  Respiratory: No increased work of breathing, good air exchange, clear to auscultation bilaterally, no crackles or wheezing  Cardiovascular: Regularrate and rhythm.  No murmurs rubs or gallops heard.   GI: Abdomen Soft, non-tender.  No masses, rebound or guarding.   Musculoskeletal: No synovitis.  Muscle strength age and body habitus appropriateas well as equal and even.   Neuropsychiatric: General: normal, calm and normal eye contact  Orientation: oriented to self, place, time and situation  Memory and insight: memory for past and recent events intact and thought process normal       Primary Care Physician   Laura Luciano    Discharge Orders      Reason for your hospital stay    You had a severe COPD exacerbation as well as probable pneumonia.  We started you on steroids, breathing treatments and antibiotics.  You initially had slow recovery but over the last 2 days have made excellent gains in your breathing.  It is important that you finish the course of antibiotics and continue on the steroid taper.  Please follow-up in clinic.  Come back to the ED if symptoms become severe again.     Follow-up and recommended labs and tests     Follow up with primary care provider, Dr. Proctor on 9/23 for hospital follow- up.     Activity    Your activity upon discharge: activity as tolerated     Oxygen Adult/Peds    Oxygen Documentation:   I certify that this patient, Brent Villagomez has been under my care (or a nurse practitioner or physican's assistant working with me). This is the face-to-face encounter for oxygen medical necessity.      Brent Villagomez is now in a chronic stable state and continues to require supplemental oxygen. Patient has continued oxygen desaturation due to Chronic Respiratory Failure with Hypoxia J93.11  COPD J44.9.    Alternative treatment(s) tried or considered and deemed clinically infective for  treatment of Chronic Respiratory Failure with Hypoxia J93.11  COPD J44.9 include nebulizers, inhalers, steroids, and pulmonary toileting.  If portability is ordered, is the patient mobile within the home? yes    **Patients who qualify for home O2 coverage under the CMS guidelines require ABG tests or O2 sat readings obtained closest to, but no earlier than 2 days prior to the discharge, as evidence of the need for home oxygen therapy. Testing must be performed while patient is in the chronic stable state. See notes for O2 sats.**     Diet    Follow this diet upon discharge: Orders Placed This Encounter      Regular Diet Adult       Significant Results and Procedures   Most Recent 3 CBC's:Recent Labs   Lab Test 09/15/21  0455 09/14/21 0447 09/13/21  0415 09/12/21  0649 09/12/21  0649   WBC  --  8.8 12.3*  --  5.4   HGB  --  14.3 14.6  --  16.0   MCV  --  100 100  --  98    209 216   < > 200    < > = values in this interval not displayed.     Most Recent 3 BMP's:Recent Labs   Lab Test 09/15/21  0455 09/14/21  0447 09/13/21  0415    143 142   POTASSIUM 3.7 3.8 3.7   CHLORIDE 107 109* 109*   CO2 23 24 23   BUN 19 21 23   CR 0.79  0.80 0.74 0.80   ANIONGAP 9 10 10   MARCY 8.8 8.8 9.2   GLC 87 91 99     Most Recent 2 LFT's:Recent Labs   Lab Test 09/12/21 0649 09/11/21 2025   AST 23 26   ALT 27 28   ALKPHOS 65 63   BILITOTAL 0.5 0.6     Most Recent 6 Bacteria Isolates From Any Culture (See EPIC Reports for Culture Details):No lab results found.,   Results for orders placed or performed during the hospital encounter of 09/11/21   XR Chest Port 1 View    Narrative    Procedure:XR CHEST PORT 1 VIEW    Clinical history:Male, 76 years, SOB    Technique: Single view was obtained.    Comparison: 1/22/2021    Findings: The cardiac silhouette is normal. The pulmonary vasculature  is normal.    The lungs are hyperinflated. There is increased density seen along the  medial aspect of the right hemidiaphragm, partially  silhouetting the  right heart border. Bony structures are unremarkable.      Impression    Impression:   Medial right perihilar/ basilar pneumonia.    BRIAN BERMUDEZ MD         SYSTEM ID:  [id number]   CT Chest Pulmonary Embolism w Contrast    Narrative    CT CHEST PULMONARY EMBOLISM W CONTRAST  9/11/2021 9:14 PM    CLINICAL HISTORY: Male, age 76 years,  PE suspected, low/intermediate  prob, positive D-dimer;    Comparison:  CT scan of chest 6/11/2021.    TECHNIQUE:  CT was performed of the chest  with IV contrast.   Sagittal, coronal, axial and MIP reconstructions were reviewed.     FINDINGS:  Chest CT:    Excellent opacification of the pulmonary arteries demonstrates no  evidence of pulmonary embolus. Thoracic aorta is intact and  demonstrate scattered areas of calcified noncalcified atherosclerotic  plaque. No evidence of acute abnormality within the are    The lungs demonstrate severe emphysematous changes,, similar in  severity. There is now consolidation seen in the superior segment of  the right lower lobe. This atelectasis in the dependent portions of  the lungs. A 6 mm pleural-based nodule located posteriorly in the  right lower lobe appears slightly larger.    Thyroid gland and the esophagus are grossly normal. Mediastinal and  hilar lymph nodes is slightly larger than the current examination  without pathologic enlargement.      Visualized portions of the upper abdomen demonstrate a 2 cm low dense  lesion of the right kidney. No lymphadenopathy.    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      Impression    IMPRESSION:   No evidence of acute vascular abnormality. No evidence of pulmonary  embolus or aortic dissection.    Severe emphysematous changes with pneumonia/consolidation and  atelectasis in the right lower lobe.    6 mm to 7 mm pleural-based nodule located posteriorly in the right  lower lobe appears to be increased slightly in size  dating back to  6/11/2021. This increase in size may be related to adjacent areas of  atelectasis.    Stable appearance of ununited fracture involving the posterior aspect  of the right 11th rib.    BRIAN BERMUDEZ MD         SYSTEM ID:  [id number]       Discharge Medications   Current Discharge Medication List      START taking these medications    Details   cefuroxime (CEFTIN) 500 MG tablet Take 1 tablet (500 mg) by mouth 2 times daily for 3 days  Qty: 6 tablet, Refills: 0    Associated Diagnoses: Pneumonia of right lower lobe due to infectious organism      predniSONE (DELTASONE) 20 MG tablet Take 2 tablets (40 mg) by mouth daily (with breakfast) for 5 days, THEN 1 tablet (20 mg) daily (with breakfast) for 5 days, THEN 0.5 tablets (10 mg) daily (with breakfast) for 5 days.  Qty: 18 tablet, Refills: 0    Associated Diagnoses: Acute on chronic respiratory failure with hypoxia (H)         CONTINUE these medications which have NOT CHANGED    Details   albuterol (PROAIR HFA/PROVENTIL HFA/VENTOLIN HFA) 108 (90 Base) MCG/ACT inhaler Inhale 2 puffs into the lungs every 6 hours as needed       albuterol (PROVENTIL) (2.5 MG/3ML) 0.083% neb solution Inhale 2.5 mg into the lungs every 4 hours as needed       amLODIPine (NORVASC) 5 MG tablet Take 2.5 mg by mouth daily       aspirin EC 81 MG EC tablet Take 81 mg by mouth daily       docusate sodium (COLACE) 100 MG capsule Take 100 mg by mouth 2 times daily as needed       Fluticasone-Umeclidin-Vilanterol (TRELEGY ELLIPTA) 100-62.5-25 MCG/INH oral inhaler Inhale 1 puff into the lungs daily       lisinopril-hydrochlorothiazide (ZESTORETIC) 20-12.5 MG tablet Take 1 tablet by mouth daily       loratadine (CLARITIN) 10 MG tablet Take 10 mg by mouth 2 times daily       LORazepam (ATIVAN) 1 MG tablet Take 1 mg by mouth 3 times daily as needed       montelukast (SINGULAIR) 10 MG tablet Take 10 mg by mouth At Bedtime       multivitamin, therapeutic (THERA-VIT) TABS tablet Take 1  tablet by mouth daily      nicotine (COMMIT) 2 MG lozenge Place 2 mg inside cheek every hour as needed for smoking cessation      Respiratory Therapy Supplies (INNOSPIRE REPLACEMENT FILTER) MISC       Respiratory Therapy Supplies (NEBULIZER/TUBING/MOUTHPIECE) KIT Use when taking nebs      tamsulosin (FLOMAX) 0.4 MG capsule Take 0.8 mg by mouth daily            Allergies   Allergies   Allergen Reactions     No Clinical Screening - See Comments      Dust and pollen

## 2021-09-16 NOTE — PLAN OF CARE
Patient was pleasant. Vitals are stable. Patient remains on 2L of oxygen. Lung sounds are expiratory wheezes throughout. Patient was able to sleep tonight. No new concerns at this time. Will continue to monitor.   Rolan Mei RN on 9/16/2021 at 5:18 AM

## 2021-09-16 NOTE — PHARMACY - DISCHARGE MEDICATION RECONCILIATION AND EDUCATION
Pharmacy: Discharge Counseling and Medication Reconciliation    Brent Villagomez  [address] [address]  [phone] (home)   76 year old male  PCP:Laura Luciano    Allergies   Allergen Reactions     No Clinical Screening - See Comments      Dust and pollen       Discharge Counseling:    Pharmacist met with patient today to review the medication portion of the After Visit Summary (with an emphasis on NEW medications) and to address patient's questions/concerns.     Summary of Education:   Met with patient at time of discharge to review all changes in medications including new cefuroxime and prednisone. Discussed indications, directions for use, and possible side effects. Patient asked one question regarding resumption of home medications and all concerns were addressed.     **Discussed prednisone taper instructions and timing of taking the medication in morning. Also discussed cefuroxime should be started tomorrow morning    **Informed patient that he can resume his other home medications tomorrow as he received home medications inpatient today.     Materials Provided:   MedCounselor sheets printed from Clinical Pharmacology on: cefuroxime, prdinisone    Discharge Medication Reconciliation:    Miriam Cisneros has reviewed the patient's discharge medication orders and has compared them to the inpatient medication administration record and to what the patient was taking prior to admission- any discrepancies have been resolved.     It has been determined that the patient has an adequate supply of medications available or which can be obtained from the patient's preferred pharmacy, which has been confirmed as: [facility].     Thank you for the consult.     Miriam Cisneros ....................  9/16/2021   10:29 AM

## 2021-09-17 ENCOUNTER — PATIENT OUTREACH (OUTPATIENT)
Dept: CARE COORDINATION | Facility: CLINIC | Age: 76
End: 2021-09-17

## 2021-09-17 LAB
BACTERIA BLD CULT: NO GROWTH
BACTERIA BLD CULT: NO GROWTH

## 2021-09-17 NOTE — PROGRESS NOTES
Clinic Care Coordination Contact    Call out to patient on this date to touch base after recent hospitalization, patient states doing much better, home with spouse and no concerns.     Transitional Care Management Phone Call    Summary of hospitalization:  [facility] discharge summary reviewed  DISCHARGE DIAGNOSIS: Chronic COPD    DATE OF DISCHARGE: 09/16/2021    Diagnostic Tests/Treatments reviewed.  Follow up needed: 09/23/2021    Post Discharge Medication Reconciliation: discharge medications reconciled, continue medications without change.    Medications reviewed by: by myself    Problems taking medications regularly:  None  Problems adhering to non-medication therapy:  None    Other Healthcare Providers Involved in Patient s Care:         None     Update since discharge: improved.     Plan of care communicated with patient    Just a friendly reminder that you appointment is   Next 5 appointments (look out 90 days)    Sep 23, 2021  9:00 AM  Office Visit with Prince Proctor MD  [facility] ([facility] ) [address]  [place] [address] [phone]      .  We encourage you to keep this appointment.    Please remember to bring all of your pills in their bottles (including any vitamins or over the counter pills) with you to your appointment.     The patient indicates understanding of these issues and agrees with the plan of care.   Yes    Was the patient contacted within the 2 business days or other approved timeframe?  Yes     Was the Medication reconciliation and management done since the patient was discharged? Yes    NIDHI Salcido  9/17/2021 8:47 AM

## 2021-09-23 ENCOUNTER — OFFICE VISIT (OUTPATIENT)
Dept: INTERNAL MEDICINE | Facility: OTHER | Age: 76
End: 2021-09-23
Attending: STUDENT IN AN ORGANIZED HEALTH CARE EDUCATION/TRAINING PROGRAM
Payer: COMMERCIAL

## 2021-09-23 VITALS
OXYGEN SATURATION: 93 % | HEART RATE: 60 BPM | SYSTOLIC BLOOD PRESSURE: 132 MMHG | HEIGHT: 69 IN | TEMPERATURE: 96.9 F | WEIGHT: 185.8 LBS | RESPIRATION RATE: 18 BRPM | BODY MASS INDEX: 27.52 KG/M2 | DIASTOLIC BLOOD PRESSURE: 78 MMHG

## 2021-09-23 DIAGNOSIS — Z79.899 OTHER LONG TERM (CURRENT) DRUG THERAPY: ICD-10-CM

## 2021-09-23 DIAGNOSIS — J44.9 STAGE 3 SEVERE COPD BY GOLD CLASSIFICATION (H): ICD-10-CM

## 2021-09-23 DIAGNOSIS — F41.9 ANXIETY: ICD-10-CM

## 2021-09-23 DIAGNOSIS — Z09 HOSPITAL DISCHARGE FOLLOW-UP: Primary | ICD-10-CM

## 2021-09-23 DIAGNOSIS — J43.9 PULMONARY EMPHYSEMA, UNSPECIFIED EMPHYSEMA TYPE (H): ICD-10-CM

## 2021-09-23 DIAGNOSIS — E80.20 DISORDER OF PORPHYRIN METABOLISM (H): ICD-10-CM

## 2021-09-23 DIAGNOSIS — J44.89 OBSTRUCTIVE CHRONIC BRONCHITIS WITHOUT EXACERBATION (H): ICD-10-CM

## 2021-09-23 DIAGNOSIS — Z02.89 PAIN MEDICATION AGREEMENT: ICD-10-CM

## 2021-09-23 DIAGNOSIS — Z23 NEED FOR IMMUNIZATION AGAINST INFLUENZA: ICD-10-CM

## 2021-09-23 DIAGNOSIS — F10.21 ALCOHOL DEPENDENCE IN REMISSION (H): ICD-10-CM

## 2021-09-23 PROBLEM — R91.8 MULTIPLE PULMONARY NODULES: Status: ACTIVE | Noted: 2018-06-20

## 2021-09-23 PROBLEM — Z78.9 HEALTH CARE DIRECTIVE ON FILE: Status: ACTIVE | Noted: 2020-12-02

## 2021-09-23 PROCEDURE — G0463 HOSPITAL OUTPT CLINIC VISIT: HCPCS

## 2021-09-23 PROCEDURE — 99496 TRANSJ CARE MGMT HIGH F2F 7D: CPT | Performed by: STUDENT IN AN ORGANIZED HEALTH CARE EDUCATION/TRAINING PROGRAM

## 2021-09-23 RX ORDER — LORAZEPAM 1 MG/1
1 TABLET ORAL 3 TIMES DAILY PRN
Qty: 90 TABLET | Refills: 0 | Status: SHIPPED | OUTPATIENT
Start: 2021-09-23 | End: 2022-08-12

## 2021-09-23 RX ORDER — ALBUTEROL SULFATE 0.83 MG/ML
SOLUTION RESPIRATORY (INHALATION)
COMMUNITY
Start: 2021-02-02 | End: 2021-09-23

## 2021-09-23 RX ORDER — ALBUTEROL SULFATE 0.83 MG/ML
SOLUTION RESPIRATORY (INHALATION)
Qty: 90 ML | Refills: 3 | Status: SHIPPED | OUTPATIENT
Start: 2021-09-23 | End: 2022-10-25

## 2021-09-23 RX ORDER — PROPRANOLOL HYDROCHLORIDE 20 MG/1
20 TABLET ORAL 4 TIMES DAILY PRN
Qty: 240 TABLET | Refills: 4 | Status: SHIPPED | OUTPATIENT
Start: 2021-09-23 | End: 2022-08-12

## 2021-09-23 RX ORDER — LORAZEPAM 1 MG/1
1 TABLET ORAL 3 TIMES DAILY PRN
Qty: 90 TABLET | Refills: 0 | Status: SHIPPED | OUTPATIENT
Start: 2021-09-23 | End: 2022-10-25

## 2021-09-23 RX ORDER — ALBUTEROL SULFATE 90 UG/1
2 AEROSOL, METERED RESPIRATORY (INHALATION) EVERY 6 HOURS PRN
Qty: 8.5 G | Refills: 11 | Status: SHIPPED | OUTPATIENT
Start: 2021-09-23 | End: 2022-10-25

## 2021-09-23 RX ORDER — LORAZEPAM 1 MG/1
1 TABLET ORAL 3 TIMES DAILY PRN
Qty: 90 TABLET | Refills: 0 | Status: SHIPPED | OUTPATIENT
Start: 2021-10-23 | End: 2022-08-12

## 2021-09-23 ASSESSMENT — ENCOUNTER SYMPTOMS
COUGH: 0
WHEEZING: 1
SHORTNESS OF BREATH: 0
PALPITATIONS: 0
UNEXPECTED WEIGHT CHANGE: 0
APNEA: 0
CHEST TIGHTNESS: 0
ABDOMINAL PAIN: 0
ABDOMINAL DISTENTION: 0

## 2021-09-23 ASSESSMENT — ANXIETY QUESTIONNAIRES
7. FEELING AFRAID AS IF SOMETHING AWFUL MIGHT HAPPEN: NOT AT ALL
GAD7 TOTAL SCORE: 3
6. BECOMING EASILY ANNOYED OR IRRITABLE: SEVERAL DAYS
5. BEING SO RESTLESS THAT IT IS HARD TO SIT STILL: NOT AT ALL
2. NOT BEING ABLE TO STOP OR CONTROL WORRYING: NOT AT ALL
1. FEELING NERVOUS, ANXIOUS, OR ON EDGE: SEVERAL DAYS
3. WORRYING TOO MUCH ABOUT DIFFERENT THINGS: SEVERAL DAYS
IF YOU CHECKED OFF ANY PROBLEMS ON THIS QUESTIONNAIRE, HOW DIFFICULT HAVE THESE PROBLEMS MADE IT FOR YOU TO DO YOUR WORK, TAKE CARE OF THINGS AT HOME, OR GET ALONG WITH OTHER PEOPLE: EXTREMELY DIFFICULT

## 2021-09-23 ASSESSMENT — PATIENT HEALTH QUESTIONNAIRE - PHQ9
SUM OF ALL RESPONSES TO PHQ QUESTIONS 1-9: 3
5. POOR APPETITE OR OVEREATING: NOT AT ALL

## 2021-09-23 ASSESSMENT — PAIN SCALES - GENERAL: PAINLEVEL: NO PAIN (0)

## 2021-09-23 ASSESSMENT — MIFFLIN-ST. JEOR: SCORE: 1568.41

## 2021-09-23 NOTE — LETTER
[facility]  09/23/21  Patient: Brent Villagomez  YOB: 1945  Medical Record Number: [id number]                                                                                  Non-Opioid Controlled Substance Agreement    This is an agreement between you and your provider regarding safe and appropriate use of controlled substances prescribed by your care team. Controlled substances are?medicines that can cause physical and mental dependence (abuse).     There are strict laws about having and using these medicines. We here at [facility] are  committed to working with you in your efforts to get better. To support you in this work, we'll help you schedule regular office appointments for medicine refills. If we must cancel or change your appointment for any reason, we'll make sure you have enough medicine to last until your next appointment.     As a Provider, I will:     Listen carefully to your concerns while treating you with respect.     Recommend a treatment plan that I believe is in your best interest and may involve therapies other than medicine.      Talk with you often about the possible benefits and the risk of harm of any medicine that we prescribe for you.    Assess the safety of this medicine and check how well it works.      Provide a plan on how to taper (discontinue or go off) using this medicine if the decision is made to stop its use.      ::  As a Patient, I understand controlled substances:       Are prescribed by my care provider to help me function or work and manage my condition(s).?    Are strong medicines and can cause serious side effects.       Need to be taken exactly as prescribed.?Combining controlled substances with certain medicines or chemicals (such as illegal drugs, alcohol, sedatives, sleeping pills, and benzodiazepines) can be dangerous or even fatal.? If I stop taking my medicines suddenly, I may have severe withdrawal symptoms.     The risks,  benefits, and side effects of these medicine(s) were explained to me. I agree that:    1. I will take part in other treatments as advised by my care team. This may be psychiatry or counseling, physical therapy, behavioral therapy, group treatment or a referral to specialist.    2. I will keep all my appointments and understand this is part of the monitoring of controlled substances.?My care team may require an office visit for EVERY controlled substance refill. If I miss appointments or don t follow instructions, my care team may stop my medicine    3. I will take my medicines as prescribed. I will not change the dose or schedule unless my care team tells me to. There will be no refills if I run out early.    4. I may be asked to come to the clinic and complete a urine drug test or complete a pill count. If I don t give a urine sample or participate in a pill count, the care team may stop my medicine.  5. I will only receive controlled substance prescriptions from this clinic. If I am treated by another provider, I will tell them that I am taking controlled substances and that I have a treatment agreement with this provider. I will inform my [facility] care team within one business day if I am given a prescription for any controlled substance by another healthcare provider. My [facility] care team can contact other providers and pharmacists about my use of any medicines.    6. It is up to me to make sure that I don't run out of my medicines on weekends or holidays.?If my care team is willing to refill my prescription without a visit, I must request refills only during office hours. Refills may take up to 3 business days to process. I will use one pharmacy to fill all my controlled substance prescriptions. I will notify the clinic about any changes to my insurance or medicine availability.    7. I am responsible for my prescriptions. If the medicine/prescription is lost, stolen or destroyed, it will not  be replaced.?I also agree not to share controlled substance medicines with anyone.     8. I am aware I should not use any illegal or recreational drugs. I agree not to drink alcohol unless my care team says I can.     9. If I enroll in the [place] Medical Cannabis program, I will tell my care team before my next refill.    10. I will tell my care team right away if I become pregnant, have a new medical problem treated outside of my regular clinic, or have a change in my medicines.     11. I understand that this medicine can affect my thinking, judgment and reaction time.? Alcohol and drugs affect the brain and body, which can affect the safety of my driving. Being under the influence of alcohol or drugs can affect my decision-making, behaviors, personal safety and the safety of others. Driving while impaired (DWI) can occur if a person is driving, operating or in physical control of a car, motorcycle, boat, snowmobile, ATV, motorbike, off-road vehicle or any other motor vehicle ([place] Statute 169A.20). I understand the risk if I choose to drive or operate any vehicle or machinery.    I understand that if I do not follow any of the conditions above, my prescriptions or treatment may be stopped or changed.   I agree that my provider, clinic care team and pharmacy may work with any city, state or federal law enforcement agency that investigates the misuse, sale or other diversion of my controlled medicine. I will allow my provider to discuss my care with, or share a copy of, this agreement with any other treating provider, pharmacy or emergency room where I receive care.     I have read this agreement and have asked questions about anything I did not understand.    ________________________________________________________  Patient Signature - Brent Villagomez     ___________________                   Date     ________________________________________________________  Provider Signature - Prince Proctor MD        ___________________                   Date     ________________________________________________________  Witness Signature (required if provider not present while patient signing)          ___________________                   Date

## 2021-09-23 NOTE — PROGRESS NOTES
SUBJECTIVE:                                                      Patient presents for Hospital Followup Visit:    Hospital:  [facility]      Date of Admission: 9/11/21  Date of Discharge: 9/16/21  Transitional Care Management Phone Call: 9/17/21  Reason(s) for Admission: COPD exacerbation, community-acquired pneumonia            Problems taking medications regularly:  None       Medication changes since discharge: None       Problems adhering to non-medication therapy:  None      Summary of hospitalization:  [facility] discharge summary reviewed    Patient presents to the ED on the day of admission with concerns of irregular heart rate, but had issues with shortness of breath and recent throat.  Patient has severe COPD on home oxygen followed by pulmonary medicine at [facility].  Labs remarkable for normal CBC, but elevated CRP and D-dimer.  CXR showed right perihilar infiltrate, CT ordered which was negative for PE.  Treated with ceftriaxone and azithromycin.  Prior to discharge he was able to return to his home oxygen requirement of 2 L/min.  He was discharged with a 15-day prednisone taper and a 10-day course of cephalosporin.      2-3L at home.   None at rest.   Still feels like has some crackles in RLL.     Albuterol neb. inhaler 2-3 times daily    Follows with pulm. Nodules in bottom of lung.   Follow up on October 6th.   Plan for valve procedure of lung.     Uses ativan for anxiety and shakes associated with albuterol.   Not driving afterward.   Discussed switching to propranolol      Diagnostic Tests/Treatments reviewed.  Follow up needed: none  Other Healthcare Providers Involved in Patient s Care:           Update since discharge: improved.         Current Outpatient Medications:      albuterol (PROAIR HFA/PROVENTIL HFA/VENTOLIN HFA) 108 (90 Base) MCG/ACT inhaler, Inhale 2 puffs into the lungs every 6 hours as needed for shortness of breath / dyspnea, Disp: 8.5 g, Rfl:  11     albuterol (PROVENTIL) (2.5 MG/3ML) 0.083% neb solution, TAKE 3 ML BY NEBULIZATION EVERY FOUR HOURS AS NEEDED FOR SHORTNESS OF BREATH., Disp: 90 mL, Rfl: 3     LORazepam (ATIVAN) 1 MG tablet, Take 1 tablet (1 mg) by mouth 3 times daily as needed for anxiety, Disp: 90 tablet, Rfl: 0     [START ON 10/23/2021] LORazepam (ATIVAN) 1 MG tablet, Take 1 tablet (1 mg) by mouth 3 times daily as needed for anxiety, Disp: 90 tablet, Rfl: 0     LORazepam (ATIVAN) 1 MG tablet, Take 1 tablet (1 mg) by mouth 3 times daily as needed for anxiety, Disp: 90 tablet, Rfl: 0     propranolol (INDERAL) 20 MG tablet, Take 1 tablet (20 mg) by mouth 4 times daily as needed (palpitations, tremor), Disp: 240 tablet, Rfl: 4     amLODIPine (NORVASC) 5 MG tablet, Take 2.5 mg by mouth daily , Disp: , Rfl:      aspirin EC 81 MG EC tablet, Take 81 mg by mouth daily , Disp: , Rfl:      docusate sodium (COLACE) 100 MG capsule, Take 100 mg by mouth 2 times daily as needed , Disp: , Rfl:      Fluticasone-Umeclidin-Vilanterol (TRELEGY ELLIPTA) 100-62.5-25 MCG/INH oral inhaler, Inhale 1 puff into the lungs daily , Disp: , Rfl:      lisinopril-hydrochlorothiazide (ZESTORETIC) 20-12.5 MG tablet, Take 1 tablet by mouth daily , Disp: , Rfl:      loratadine (CLARITIN) 10 MG tablet, Take 10 mg by mouth 2 times daily , Disp: , Rfl:      montelukast (SINGULAIR) 10 MG tablet, Take 10 mg by mouth At Bedtime , Disp: , Rfl:      multivitamin, therapeutic (THERA-VIT) TABS tablet, Take 1 tablet by mouth daily, Disp: , Rfl:      nicotine (COMMIT) 2 MG lozenge, Place 2 mg inside cheek every hour as needed for smoking cessation, Disp: , Rfl:      predniSONE (DELTASONE) 20 MG tablet, Take 2 tablets (40 mg) by mouth daily (with breakfast) for 5 days, THEN 1 tablet (20 mg) daily (with breakfast) for 5 days, THEN 0.5 tablets (10 mg) daily (with breakfast) for 5 days., Disp: 18 tablet, Rfl: 0     Respiratory Therapy Supplies (INNOSPIRE REPLACEMENT FILTER) MISC, , Disp: ,  "Rfl:      Respiratory Therapy Supplies (NEBULIZER/TUBING/MOUTHPIECE) KIT, Use when taking nebs, Disp: , Rfl:      tamsulosin (FLOMAX) 0.4 MG capsule, Take 0.8 mg by mouth daily , Disp: , Rfl:       Review of Systems   Constitutional: Negative for unexpected weight change.   HENT: Negative for dental problem.    Respiratory: Positive for wheezing. Negative for apnea, cough, chest tightness and shortness of breath.    Cardiovascular: Negative for chest pain, palpitations and leg swelling.   Gastrointestinal: Negative for abdominal distention and abdominal pain.        OBJECTIVE:                                                      Vitals:    09/23/21 0911 09/23/21 0912   BP: 132/78    BP Location: Right arm    Patient Position: Sitting    Cuff Size: Adult Regular    Pulse: 60    Resp: 24 18   Temp: 96.9  F (36.1  C)    TempSrc: Tympanic    SpO2: (!) 87% 93%   Weight: 84.3 kg (185 lb 12.8 oz)    Height: 1.745 m (5' 8.7\")       Physical Exam  Vitals and nursing note reviewed.   Constitutional:       General: He is not in acute distress.     Appearance: Normal appearance. He is well-developed and normal weight. He is not diaphoretic.   HENT:      Head: Normocephalic and atraumatic.   Eyes:      General: No scleral icterus.     Conjunctiva/sclera: Conjunctivae normal.   Cardiovascular:      Rate and Rhythm: Normal rate and regular rhythm.   Pulmonary:      Effort: Pulmonary effort is normal.      Breath sounds: Normal breath sounds.   Musculoskeletal:         General: No deformity.      Cervical back: Neck supple.   Lymphadenopathy:      Cervical: No cervical adenopathy.   Skin:     General: Skin is warm and dry.      Coloration: Skin is not jaundiced.      Findings: No rash.   Neurological:      Mental Status: He is alert and oriented to person, place, and time. Mental status is at baseline.   Psychiatric:         Mood and Affect: Mood normal.         Behavior: Behavior normal.          ASSESSMENT/PLAN:                   "                                      ICD-10-CM    1. Hospital discharge follow-up  Z09    2. Need for immunization against influenza  Z23    3. 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 Obstructive chronic bronchitis without exacerbation (H)  J44.9    6. Stage 3 severe COPD by GOLD classification (H)  J44.9 albuterol (PROVENTIL) (2.5 MG/3ML) 0.083% neb solution     albuterol (PROAIR HFA/PROVENTIL HFA/VENTOLIN HFA) 108 (90 Base) MCG/ACT inhaler   7. Pulmonary emphysema, unspecified emphysema type (H)  J43.9 albuterol (PROVENTIL) (2.5 MG/3ML) 0.083% neb solution     albuterol (PROAIR HFA/PROVENTIL HFA/VENTOLIN HFA) 108 (90 Base) MCG/ACT inhaler   8. Anxiety  F41.9 propranolol (INDERAL) 20 MG tablet     LORazepam (ATIVAN) 1 MG tablet     LORazepam (ATIVAN) 1 MG tablet     LORazepam (ATIVAN) 1 MG tablet       Post hospital follow-up, COPD, emphysema: Patient is on triple therapy with Trelegy inhaler, has follow-up with his pulmonologist in a week and a half.  No changes to his medications, refilled his albuterol inhaler and neb solution.  Breathing is improved and is to continue his prednisone taper.  Has gotten his flu shot.  Encouraged oxygen use when ambulating or when hypoxic.  Okay to take oxygen off when at rest and saturations above 88%.  No longer smoker.  Patient taking lorazepam as needed for anxiety and tremor associated with his albuterol use.  Discussed attempting taking propranolol which is nonpulmonary selective to assist with the symptoms.  He will attempt to can revert from Ativan to propranolol in the future and let me know how this works.  -Continue prednisone taper  -Antibiotics complete  -Lorazepam 1 mg 3 times daily, convert to propranolol as needed for tremors and anxiety as needed      Post Discharge Medication Reconciliation: discharge medications reconciled, continue medications without change.  Issues to address:   Plan of care  communicated with patient and family    Follow up with me in 3 to 6 months for annual exam, sooner if needed for acute visit    Prince Proctor MD

## 2021-09-23 NOTE — NURSING NOTE
Patient is followed by JUANITA MORA for ongoing prescription of benzodiazepines.  All refills should be approved by this provider, or covering partner.    Medication(s): LORazepam (ATIVAN) 1 MG tablet.   Dora Ramsey LPN .............9/23/2021  9:51 AM

## 2021-09-23 NOTE — PROGRESS NOTES
Medication Reconciliation: complete   Advance Care Directive Reviewed    FOOD SECURITY SCREENING QUESTIONS  Hunger Vital Signs:  Within the past 12 months we worried whether our food would run out before we got money to buy more. Never  Within the past 12 months the food we bought just didn't last and we didn't have money to get more. Never  Dora Ramsey LPN .............9/23/2021  9:22 AM

## 2021-09-24 ASSESSMENT — ANXIETY QUESTIONNAIRES: GAD7 TOTAL SCORE: 3

## 2021-10-06 ENCOUNTER — VIRTUAL VISIT (OUTPATIENT)
Dept: PULMONOLOGY | Facility: CLINIC | Age: 76
End: 2021-10-06
Attending: INTERNAL MEDICINE
Payer: COMMERCIAL

## 2021-10-06 DIAGNOSIS — J43.2 CENTRILOBULAR EMPHYSEMA (H): Primary | ICD-10-CM

## 2021-10-06 PROCEDURE — 99214 OFFICE O/P EST MOD 30 MIN: CPT | Mod: 95 | Performed by: INTERNAL MEDICINE

## 2021-10-06 PROCEDURE — 999N001193 HC VIDEO/TELEPHONE VISIT; NO CHARGE

## 2021-10-06 NOTE — PROGRESS NOTES
Brent is a 76 year old who is being evaluated via a billable video visit.      How would you like to obtain your AVS? MyChart  If the video visit is dropped, the invitation should be resent by: Send to e-mail at: [email or web address]  Will anyone else be joining your video visit? Yes, wife        Video-Visit Details    Type of service:  Video Visit    Video Time: 12 minutes  Total visit time: 30 minutes, including video time, reviewing CT chest, PFTs and documentation during the day of the visit.    Originating Location (pt. Location): Home    Distant Location (provider location):  [facility]     Platform used for Video Visit: [facility]  Interventional Pulmonary Clinic Virtual Visit Note    October 6, 2021    Chief complaint:  Brent Villagomez is a 76 year old male seen for   Chief Complaint   Patient presents with     Video Visit     return       Reason for clinic visit / Chief complaint:   Severe COPD     Assessment and Plan:  Severe COPD, evaluation for lung volume reduction.  Patient was sent by his pulmonologist for further evaluation of or his candidacy for bronchoscopic lung volume reduction.  He has had COPD at least for 10 to 15 years.  He has been on Trelegy and nebulizer which he uses recently every 4 hours.  He is also on home oxygen treatment as below.  We discussed bronchoscopic lung volume reduction and need for further testing to determine if he is a good candidate or not.  6-minute walk test, comprehensive PFTs are done and aceceptable and [facility] selected CT chest done in August 2021 waiting to be evaluated by [facility] pending portal establishment between [facility] and [email or web address].    Recent hospitalization (first time) for copd exacerbation. Off prednisone  Former smoker  Hypertension on medications        Billing: Based on Medical Decision Making (Complexity):  L4     History of Present Illness:  76 years old gentleman with known severe COPD has been on  inhalers/nebulizer with limited physical capacity.  He has had PFTs done in 2021 revealing FEV1 of 1.2 L.  His main symptom is dyspnea on exertion.  He appears to be having exacerbations averaging once a year but he has never been in the hospital or emergency room because of COPD in the past. Recent PFTs from 2021 showed FEV1 of 1.27L (44%)--severe obstruction with % increase following administration of a bronchodilator, DLCOc 23% and TLC of 119% of predicted. Same day, 6MWT showed no desaturations, covering 712 feet     HOT 2 lpm/NC sitting, 4 lpm on exertion  trelogy inhaler  Nebs--q4 hours  Prednisone use-- once a year. First hospitalization for copd exacerbation 2 month ago  Smoker 50 pack year, quit 5 years ago      Allergies   Allergen Reactions     No Clinical Screening - See Comments      Dust and pollen        Past Medical History:   Diagnosis Date     Chronic hepatitis C (H)     No Comments Provided     Disorder of porphyrin metabolism (H)     No Comments Provided     Lumbar sprain     No Comments Provided     Other and unspecified alcohol dependence, unspecified drinking behavior     No Comments Provided     Other specified chronic obstructive airways disease     No Comments Provided     Personal history of other specified diseases     occupational, hammering     Polycythemia, secondary     No Comments Provided     Unspecified visual loss     near and far        Past Surgical History:   Procedure Laterality Date     CLOSED REDUCTION ANKLE      Fx ankle with plate        Social History     Socioeconomic History     Marital status:      Spouse name: Not on file     Number of children: Not on file     Years of education: Not on file     Highest education level: Not on file   Occupational History     Not on file   Tobacco Use     Smoking status: Former Smoker     Packs/day: 1.00     Types: Cigarettes     Quit date: 2016     Years since quittin.0     Smokeless tobacco: Never Used    Vaping Use     Vaping Use: Never used   Substance and Sexual Activity     Alcohol use: No     Drug use: Never     Sexual activity: Not Currently   Other Topics Concern     Not on file   Social History Narrative    Living on [address] [place].  Lives by self, seperated, 2 children grown.  Disabled from COPD, Hepatitis C.    p 7/19/2013.     Social Determinants of Health     Financial Resource Strain:      Difficulty of Paying Living Expenses:    Food Insecurity:      Worried About Running Out of Food in the Last Year:      Ran Out of Food in the Last Year:    Transportation Needs:      Lack of Transportation (Medical):      Lack of Transportation (Non-Medical):    Physical Activity:      Days of Exercise per Week:      Minutes of Exercise per Session:    Stress:      Feeling of Stress :    Social Connections:      Frequency of Communication with Friends and Family:      Frequency of Social Gatherings with Friends and Family:      Attends Taoist Services:      Active Member of Clubs or Organizations:      Attends Club or Organization Meetings:      Marital Status:    Intimate Partner Violence:      Fear of Current or Ex-Partner:      Emotionally Abused:      Physically Abused:      Sexually Abused:         Family History   Problem Relation Age of Onset     Heart Disease Mother         Heart Disease,valve disease/rheumatic     Other - See Comments Father         sudden death age 70     Arthritis Father         Arthritis     Cancer Brother         Cancer,Skin cancer        Immunization History   Administered Date(s) Administered     COVID-19,PF,Pfizer 02/24/2021, 03/24/2021, 09/27/2021     Flu, Unspecified 11/19/2004, 09/27/2010     [id number] Novel Flu P-free 01/05/2010     HepB-Adult 01/24/2002, 10/08/2015, 05/02/2017     Influenza (High Dose) 3 valent vaccine 10/30/2012, 10/20/2014, 12/03/2015, 10/20/2016, 09/22/2017, 11/27/2018     Influenza (IIV3) PF 10/23/2007, 11/19/2008, 09/15/2009,  09/27/2010, 10/11/2011, 10/14/2013, 11/27/2018     Influenza, Quad, High Dose, Pf, 65yr+ (Fluzone HD) 09/22/2020, 09/22/2021     Influenza, Whole Virus 11/17/2005, 10/23/2006     Influenza,INJ,MDCK,PF,Quad >4yrs 10/07/2019     Pneumo Conj 13-V (2010&after) 10/08/2015     Pneumococcal 23 valent 01/28/2003, 03/27/2012     Tdap (Adacel,Boostrix) 06/22/2000, 02/14/2012     Zoster vaccine recombinant adjuvanted (SHINGRIX) 10/07/2019, 12/14/2019     Zoster vaccine, live 10/30/2012       Current Outpatient Medications   Medication Sig     albuterol (PROAIR HFA/PROVENTIL HFA/VENTOLIN HFA) 108 (90 Base) MCG/ACT inhaler Inhale 2 puffs into the lungs every 6 hours as needed for shortness of breath / dyspnea     albuterol (PROVENTIL) (2.5 MG/3ML) 0.083% neb solution TAKE 3 ML BY NEBULIZATION EVERY FOUR HOURS AS NEEDED FOR SHORTNESS OF BREATH.     amLODIPine (NORVASC) 5 MG tablet Take 2.5 mg by mouth daily      aspirin EC 81 MG EC tablet Take 81 mg by mouth daily      docusate sodium (COLACE) 100 MG capsule Take 100 mg by mouth 2 times daily as needed      Fluticasone-Umeclidin-Vilanterol (TRELEGY ELLIPTA) 100-62.5-25 MCG/INH oral inhaler Inhale 1 puff into the lungs daily      lisinopril-hydrochlorothiazide (ZESTORETIC) 20-12.5 MG tablet Take 1 tablet by mouth daily      loratadine (CLARITIN) 10 MG tablet Take 10 mg by mouth 2 times daily      LORazepam (ATIVAN) 1 MG tablet Take 1 tablet (1 mg) by mouth 3 times daily as needed for anxiety     [START ON 10/23/2021] LORazepam (ATIVAN) 1 MG tablet Take 1 tablet (1 mg) by mouth 3 times daily as needed for anxiety     LORazepam (ATIVAN) 1 MG tablet Take 1 tablet (1 mg) by mouth 3 times daily as needed for anxiety     montelukast (SINGULAIR) 10 MG tablet Take 10 mg by mouth At Bedtime      multivitamin, therapeutic (THERA-VIT) TABS tablet Take 1 tablet by mouth daily     nicotine (COMMIT) 2 MG lozenge Place 2 mg inside cheek every hour as needed for smoking cessation     propranolol  (INDERAL) 20 MG tablet Take 1 tablet (20 mg) by mouth 4 times daily as needed (palpitations, tremor)     Respiratory Therapy Supplies (INNOSPIRE REPLACEMENT FILTER) [facility]      Respiratory Therapy Supplies (NEBULIZER/TUBING/MOUTHPIECE) KIT Use when taking nebs     tamsulosin (FLOMAX) 0.4 MG capsule Take 0.8 mg by mouth daily      No current facility-administered medications for this visit.        Review of Systems:  I have done 10 points of review systems and all negative except for those mentioned in HPI    Physical examination  Constitutional: Oriented, not in distress  Head and neck: normal posture and movements  Respiratory: Normal tidal breathing, no shortness of breath, no audible wheezing or stridor over the phone or video visit  Neurological: Alert, orientedx3  Psychiatric:  Mood and affect are appropriate with insight into his/her medical condition    Data:  Lab Results   Component Value Date    WBC 8.8 09/14/2021     Lab Results   Component Value Date    RBC 4.18 09/14/2021     Lab Results   Component Value Date    HGB 14.3 09/14/2021     Lab Results   Component Value Date    HCT 41.6 09/14/2021     Lab Results   Component Value Date     09/14/2021     Lab Results   Component Value Date    MCH 34.2 09/14/2021     Lab Results   Component Value Date    MCHC 34.4 09/14/2021     Lab Results   Component Value Date    RDW 12.5 09/14/2021     Lab Results   Component Value Date     09/15/2021       Lab Results   Component Value Date     09/15/2021      Lab Results   Component Value Date    POTASSIUM 3.7 09/15/2021     Lab Results   Component Value Date    CHLORIDE 107 09/15/2021     Lab Results   Component Value Date    MARCY 8.8 09/15/2021     Lab Results   Component Value Date    CO2 23 09/15/2021     Lab Results   Component Value Date    BUN 19 09/15/2021     Lab Results   Component Value Date    CR 0.80 09/15/2021    CR 0.79 09/15/2021     Lab Results   Component Value Date    GLC 87 09/15/2021          LARISA Cole MD

## 2021-10-06 NOTE — LETTER
10/6/2021       RE: Brent Villagomez  [address]   [place] [address]     Dear Colleague,    Thank you for referring your patient, Brent Villagomez, to the [facility] at [facility]. Please see a copy of my visit note below.        [facility]  Interventional Pulmonary Clinic Virtual Visit Note    October 6, 2021    Chief complaint:  Brent Villagomez is a 76 year old male seen for   Chief Complaint   Patient presents with     Video Visit     return       Reason for clinic visit / Chief complaint:   Severe COPD     Assessment and Plan:  Severe COPD, evaluation for lung volume reduction.  Patient was sent by his pulmonologist for further evaluation of or his candidacy for bronchoscopic lung volume reduction.  He has had COPD at least for 10 to 15 years.  He has been on Trelegy and nebulizer which he uses recently every 4 hours.  He is also on home oxygen treatment as below.  We discussed bronchoscopic lung volume reduction and need for further testing to determine if he is a good candidate or not.  6-minute walk test, comprehensive PFTs are done and aceceptable and [facility] selected CT chest done in August 2021 waiting to be evaluated by [facility] pending portal establishment between [facility] and [employer].    Recent hospitalization (first time) for copd exacerbation. Off prednisone  Former smoker  Hypertension on medications        Billing: Based on Medical Decision Making (Complexity):  L4     History of Present Illness:  76 years old gentleman with known severe COPD has been on inhalers/nebulizer with limited physical capacity.  He has had PFTs done in April 2021 revealing FEV1 of 1.2 L.  His main symptom is dyspnea on exertion.  He appears to be having exacerbations averaging once a year but he has never been in the hospital or emergency room because of COPD in the past.     HOT 2 lpm/NC sitting, 4 lpm on exertion  trelogy inhaler  Nebs--q4  hours  Prednisone use-- once a year. First hospitalization for copd exacerbation 2 month ago  Smoker 50 pack year, quit 5 years ago         Allergies   Allergen Reactions     No Clinical Screening - See Comments      Dust and pollen        Past Medical History:   Diagnosis Date     Chronic hepatitis C (H)     No Comments Provided     Disorder of porphyrin metabolism (H)     No Comments Provided     Lumbar sprain     No Comments Provided     Other and unspecified alcohol dependence, unspecified drinking behavior     No Comments Provided     Other specified chronic obstructive airways disease     No Comments Provided     Personal history of other specified diseases     occupational, hammering     Polycythemia, secondary     No Comments Provided     Unspecified visual loss     near and far        Past Surgical History:   Procedure Laterality Date     CLOSED REDUCTION ANKLE      Fx ankle with plate        Social History     Socioeconomic History     Marital status:      Spouse name: Not on file     Number of children: Not on file     Years of education: Not on file     Highest education level: Not on file   Occupational History     Not on file   Tobacco Use     Smoking status: Former Smoker     Packs/day: 1.00     Types: Cigarettes     Quit date: 2016     Years since quittin.0     Smokeless tobacco: Never Used   Vaping Use     Vaping Use: Never used   Substance and Sexual Activity     Alcohol use: No     Drug use: Never     Sexual activity: Not Currently   Other Topics Concern     Not on file   Social History Narrative    Living on [facility].  Lives by self, seperated, 2 children grown.  Disabled from COPD, Hepatitis C.    p 2013.     Social Determinants of Health     Financial Resource Strain:      Difficulty of Paying Living Expenses:    Food Insecurity:      Worried About Running Out of Food in the Last Year:      Ran Out of Food in the Last Year:    Transportation  Needs:      Lack of Transportation (Medical):      Lack of Transportation (Non-Medical):    Physical Activity:      Days of Exercise per Week:      Minutes of Exercise per Session:    Stress:      Feeling of Stress :    Social Connections:      Frequency of Communication with Friends and Family:      Frequency of Social Gatherings with Friends and Family:      Attends Jew Services:      Active Member of Clubs or Organizations:      Attends Club or Organization Meetings:      Marital Status:    Intimate Partner Violence:      Fear of Current or Ex-Partner:      Emotionally Abused:      Physically Abused:      Sexually Abused:         Family History   Problem Relation Age of Onset     Heart Disease Mother         Heart Disease,valve disease/rheumatic     Other - See Comments Father         sudden death age 70     Arthritis Father         Arthritis     Cancer Brother         Cancer,Skin cancer        Immunization History   Administered Date(s) Administered     COVID-19,PF,Pfizer 02/24/2021, 03/24/2021, 09/27/2021     Flu, Unspecified 11/19/2004, 09/27/2010     [id number] Novel Flu P-free 01/05/2010     HepB-Adult 01/24/2002, 10/08/2015, 05/02/2017     Influenza (High Dose) 3 valent vaccine 10/30/2012, 10/20/2014, 12/03/2015, 10/20/2016, 09/22/2017, 11/27/2018     Influenza (IIV3) PF 10/23/2007, 11/19/2008, 09/15/2009, 09/27/2010, 10/11/2011, 10/14/2013, 11/27/2018     Influenza, Quad, High Dose, Pf, 65yr+ (Fluzone HD) 09/22/2020, 09/22/2021     Influenza, Whole Virus 11/17/2005, 10/23/2006     Influenza,INJ,MDCK,PF,Quad >4yrs 10/07/2019     Pneumo Conj 13-V (2010&after) 10/08/2015     Pneumococcal 23 valent 01/28/2003, 03/27/2012     Tdap (Adacel,Boostrix) 06/22/2000, 02/14/2012     Zoster vaccine recombinant adjuvanted (SHINGRIX) 10/07/2019, 12/14/2019     Zoster vaccine, live 10/30/2012       Current Outpatient Medications   Medication Sig     albuterol (PROAIR HFA/PROVENTIL HFA/VENTOLIN HFA) 108 (90 Base) MCG/ACT  inhaler Inhale 2 puffs into the lungs every 6 hours as needed for shortness of breath / dyspnea     albuterol (PROVENTIL) (2.5 MG/3ML) 0.083% neb solution TAKE 3 ML BY NEBULIZATION EVERY FOUR HOURS AS NEEDED FOR SHORTNESS OF BREATH.     amLODIPine (NORVASC) 5 MG tablet Take 2.5 mg by mouth daily      aspirin EC 81 MG EC tablet Take 81 mg by mouth daily      docusate sodium (COLACE) 100 MG capsule Take 100 mg by mouth 2 times daily as needed      Fluticasone-Umeclidin-Vilanterol (TRELEGY ELLIPTA) 100-62.5-25 MCG/INH oral inhaler Inhale 1 puff into the lungs daily      lisinopril-hydrochlorothiazide (ZESTORETIC) 20-12.5 MG tablet Take 1 tablet by mouth daily      loratadine (CLARITIN) 10 MG tablet Take 10 mg by mouth 2 times daily      LORazepam (ATIVAN) 1 MG tablet Take 1 tablet (1 mg) by mouth 3 times daily as needed for anxiety     [START ON 10/23/2021] LORazepam (ATIVAN) 1 MG tablet Take 1 tablet (1 mg) by mouth 3 times daily as needed for anxiety     LORazepam (ATIVAN) 1 MG tablet Take 1 tablet (1 mg) by mouth 3 times daily as needed for anxiety     montelukast (SINGULAIR) 10 MG tablet Take 10 mg by mouth At Bedtime      multivitamin, therapeutic (THERA-VIT) TABS tablet Take 1 tablet by mouth daily     nicotine (COMMIT) 2 MG lozenge Place 2 mg inside cheek every hour as needed for smoking cessation     propranolol (INDERAL) 20 MG tablet Take 1 tablet (20 mg) by mouth 4 times daily as needed (palpitations, tremor)     Respiratory Therapy Supplies (INNOSPIRE REPLACEMENT FILTER) [facility]      Respiratory Therapy Supplies (NEBULIZER/TUBING/MOUTHPIECE) KIT Use when taking nebs     tamsulosin (FLOMAX) 0.4 MG capsule Take 0.8 mg by mouth daily      No current facility-administered medications for this visit.        Review of Systems:  I have done 10 points of review systems and all negative except for those mentioned in HPI    Physical examination  Constitutional: Oriented, not in distress  Head and neck: normal posture and  movements  Respiratory: Normal tidal breathing, no shortness of breath, no audible wheezing or stridor over the phone or video visit  Neurological: Alert, orientedx3  Psychiatric:  Mood and affect are appropriate with insight into his/her medical condition    Data:  Lab Results   Component Value Date    WBC 8.8 09/14/2021     Lab Results   Component Value Date    RBC 4.18 09/14/2021     Lab Results   Component Value Date    HGB 14.3 09/14/2021     Lab Results   Component Value Date    HCT 41.6 09/14/2021     Lab Results   Component Value Date     09/14/2021     Lab Results   Component Value Date    MCH 34.2 09/14/2021     Lab Results   Component Value Date    MCHC 34.4 09/14/2021     Lab Results   Component Value Date    RDW 12.5 09/14/2021     Lab Results   Component Value Date     09/15/2021       Lab Results   Component Value Date     09/15/2021      Lab Results   Component Value Date    POTASSIUM 3.7 09/15/2021     Lab Results   Component Value Date    CHLORIDE 107 09/15/2021     Lab Results   Component Value Date    MARCY 8.8 09/15/2021     Lab Results   Component Value Date    CO2 23 09/15/2021     Lab Results   Component Value Date    BUN 19 09/15/2021     Lab Results   Component Value Date    CR 0.80 09/15/2021    CR 0.79 09/15/2021     Lab Results   Component Value Date    GLC 87 09/15/2021         ALRISA Cole MD

## 2021-10-07 ENCOUNTER — PATIENT OUTREACH (OUTPATIENT)
Dept: CARE COORDINATION | Facility: CLINIC | Age: 76
End: 2021-10-07

## 2021-10-07 NOTE — PROGRESS NOTES
Oncology Distress Screening Follow-up  Clinical Social Work  [facility]    Identified Concern and Score From Distress Screenin. How concerned are you about knowing what resources are available to help you?   6       Date of Distress Screening: 10/7/21    Data: Brent is a 76 year old gentleman diagnosed with severe COPD. Brent had a provider visit on 10/6/21 where he expressed interest knowing more about resources that could be available to him.     Intervention: SW reached out to Brent via phone today to discuss his concerns further. SW received no answer and a message was left with SW contact info. SW encouraged a call back.     Education Provided: Oncology Clinic SW contact info    Follow-up Required: KENRICK awaits a call back.       LEON Mcdaniels, LGSW  [facility]  Adult Oncology Clinic  Phone: [phone]

## 2022-01-17 ENCOUNTER — PATIENT OUTREACH (OUTPATIENT)
Dept: PULMONOLOGY | Facility: CLINIC | Age: 77
End: 2022-01-17
Payer: COMMERCIAL

## 2022-01-17 DIAGNOSIS — J43.2 CENTRILOBULAR EMPHYSEMA (H): Primary | ICD-10-CM

## 2022-01-17 NOTE — PROGRESS NOTES
Called to advise patient that the software for evaluation for IBV valves is up and running.  Asked if patient is still interested.  The patient is interested.  Informed patient's wife Gillian that the patient will need a new set of PFT's with 6 minute walk test.  They'd like to do at [facility].      Faxed the orders to the [facility] to the fax number [phone].  Received confirmation the fax went through.

## 2022-01-20 ENCOUNTER — ALLIED HEALTH/NURSE VISIT (OUTPATIENT)
Dept: FAMILY MEDICINE | Facility: OTHER | Age: 77
End: 2022-01-20
Attending: INTERNAL MEDICINE
Payer: COMMERCIAL

## 2022-01-20 DIAGNOSIS — Z20.822 COVID-19 RULED OUT: Primary | ICD-10-CM

## 2022-01-20 PROCEDURE — U0003 INFECTIOUS AGENT DETECTION BY NUCLEIC ACID (DNA OR RNA); SEVERE ACUTE RESPIRATORY SYNDROME CORONAVIRUS 2 (SARS-COV-2) (CORONAVIRUS DISEASE [COVID-19]), AMPLIFIED PROBE TECHNIQUE, MAKING USE OF HIGH THROUGHPUT TECHNOLOGIES AS DESCRIBED BY CMS-2020-01-R: HCPCS | Mod: ZL

## 2022-01-20 PROCEDURE — C9803 HOPD COVID-19 SPEC COLLECT: HCPCS

## 2022-01-20 NOTE — PROGRESS NOTES
Patient here for Covid Testing. Pre op procedure pft GICH 1/24/22    Paulette Mckeon MA on 1/20/2022 at 9:46 AM

## 2022-01-21 DIAGNOSIS — J43.9 COPD WITH EMPHYSEMA (H): ICD-10-CM

## 2022-01-21 DIAGNOSIS — J44.1 COPD EXACERBATION (H): ICD-10-CM

## 2022-01-21 DIAGNOSIS — J44.89 OBSTRUCTIVE CHRONIC BRONCHITIS WITHOUT EXACERBATION (H): Primary | ICD-10-CM

## 2022-01-21 LAB — SARS-COV-2 RNA RESP QL NAA+PROBE: NEGATIVE

## 2022-01-24 ENCOUNTER — HOSPITAL ENCOUNTER (OUTPATIENT)
Dept: RESPIRATORY THERAPY | Facility: OTHER | Age: 77
Discharge: HOME | End: 2022-01-24
Attending: INTERNAL MEDICINE | Admitting: INTERNAL MEDICINE
Payer: COMMERCIAL

## 2022-01-24 ENCOUNTER — HOSPITAL ENCOUNTER (OUTPATIENT)
Dept: RESPIRATORY THERAPY | Facility: OTHER | Age: 77
End: 2022-01-24
Attending: INTERNAL MEDICINE
Payer: COMMERCIAL

## 2022-01-24 DIAGNOSIS — J43.2 CENTRILOBULAR EMPHYSEMA (H): ICD-10-CM

## 2022-01-24 LAB
6 MIN WALK (FT): NORMAL
6 MIN WALK (M): NORMAL

## 2022-01-24 PROCEDURE — 94726 PLETHYSMOGRAPHY LUNG VOLUMES: CPT | Mod: 26 | Performed by: INTERNAL MEDICINE

## 2022-01-24 PROCEDURE — 94729 DIFFUSING CAPACITY: CPT

## 2022-01-24 PROCEDURE — 94060 EVALUATION OF WHEEZING: CPT | Mod: 26

## 2022-01-24 PROCEDURE — 999N000105 HC STATISTIC NO DOCUMENTATION TO SUPPORT CHARGE

## 2022-01-24 PROCEDURE — 999N000157 HC STATISTIC RCP TIME EA 10 MIN

## 2022-01-24 PROCEDURE — 250N000009 HC RX 250

## 2022-01-24 PROCEDURE — 94060 EVALUATION OF WHEEZING: CPT | Mod: 26 | Performed by: INTERNAL MEDICINE

## 2022-01-24 PROCEDURE — 94618 PULMONARY STRESS TESTING: CPT

## 2022-01-24 PROCEDURE — 94729 DIFFUSING CAPACITY: CPT | Mod: 26 | Performed by: INTERNAL MEDICINE

## 2022-01-24 PROCEDURE — 94726 PLETHYSMOGRAPHY LUNG VOLUMES: CPT

## 2022-01-24 PROCEDURE — 94640 AIRWAY INHALATION TREATMENT: CPT | Mod: XU

## 2022-01-24 RX ORDER — ALBUTEROL SULFATE 0.83 MG/ML
2.5 SOLUTION RESPIRATORY (INHALATION)
Status: COMPLETED | OUTPATIENT
Start: 2022-01-24 | End: 2022-01-24

## 2022-01-24 RX ADMIN — ALBUTEROL SULFATE 2.5 MG: 2.5 SOLUTION RESPIRATORY (INHALATION) at 11:40

## 2022-01-26 ENCOUNTER — VIRTUAL VISIT (OUTPATIENT)
Dept: PULMONOLOGY | Facility: CLINIC | Age: 77
End: 2022-01-26
Attending: INTERNAL MEDICINE
Payer: COMMERCIAL

## 2022-01-26 DIAGNOSIS — J43.2 CENTRILOBULAR EMPHYSEMA (H): Primary | ICD-10-CM

## 2022-01-26 PROCEDURE — 99215 OFFICE O/P EST HI 40 MIN: CPT | Mod: 95 | Performed by: INTERNAL MEDICINE

## 2022-01-26 NOTE — LETTER
1/26/2022       RE: Brent Villagomez  [address]  [facility] [address]     Dear Colleague,    Thank you for referring your patient, Brent Villagomez[facility]. Please see a copy of my visit note below.        [facility]  Interventional Pulmonary Clinic Virtual Visit Note    January 26, 2022    Chief complaint:  Brent Villagomez is a 76 year old male seen for   Chief Complaint   Patient presents with     RECHECK       Reason for clinic visit / Chief complaint:   Severe COPD     Assessment and Plan:  # Severe COPD, evaluation for lung volume reduction.  Patient was sent by his pulmonologist for further evaluation of or his candidacy for bronchoscopic lung volume reduction.  He has had COPD at least for 10 to 15 years.  He has been on Trelegy and nebulizer (albuterol q4 hours).  He is also on home oxygen treatment as below.  We discussed bronchoscopic lung volume reduction, its pros, cons and potential (even life threatening) complications related to the procedure.  6-minute walk test (borderline at 560 ft), comprehensive PFTs are done (borderline DLCO at 23% of predicted) and aceceptable and [facility] selected CT chest done in August 2021 is acceptable for ROSIE, see picture below.  I indicated my concerns about having 2 criteria being borderline (DLCO and 6-minute walk test) as well as patient's location ([place], [place]) in case he has emergency such as tension pneumothorax after the procedure/discharged.  Patient will think about the procedure more with his family and we will talk again tomorrow to hear his decision.  If he decides to proceed with the procedure I will do echocardiogram make sure that he does not have pulmonary hypertension or other heart conditions.  I have reviewed his EKG as well as his CT scans.    # Recent hospitalization (first time)--3-4 months ago-- for copd exacerbation. Off  prednisone    # Former smoker    # Hypertension on medications        History of Present Illness:  76 years old gentleman with known severe COPD has been on inhalers/nebulizer with limited physical capacity.  He has had PFTs done in April 2021 revealing FEV1 of 1.2 L.  His main symptom is dyspnea on exertion.  He appears to be having exacerbations averaging once a year but he has never been in the hospital or emergency room because of COPD in the past. Recent PFTs from 8/18/2021 showed FEV1 of 1.27L (44%)--severe obstruction with % increase following administration of a bronchodilator, DLCOc 23% and TLC of 119% of predicted. Same day, 6MWT showed no desaturations, covering 712 feet     HOT 2 lpm/NC sitting, 4 lpm on exertion  trelogy inhaler  Nebs--q4 hours  Prednisone use-- once a year. First hospitalization for copd exacerbation 4 month ago  Smoker 50 pack year, quit 5 years ago         Allergies   Allergen Reactions     No Clinical Screening - See Comments      Dust and pollen        Past Medical History:   Diagnosis Date     Chronic hepatitis C (H)     No Comments Provided     Disorder of porphyrin metabolism (H)     No Comments Provided     Lumbar sprain     No Comments Provided     Other and unspecified alcohol dependence, unspecified drinking behavior     No Comments Provided     Other specified chronic obstructive airways disease     No Comments Provided     Personal history of other specified diseases     occupational, hammering     Polycythemia, secondary     No Comments Provided     Unspecified visual loss     near and far        Past Surgical History:   Procedure Laterality Date     CLOSED REDUCTION ANKLE      Fx ankle with plate        Social History     Socioeconomic History     Marital status:      Spouse name: Not on file     Number of children: Not on file     Years of education: Not on file     Highest education level: Not on file   Occupational History     Not on file   Tobacco Use      Smoking status: Former Smoker     Packs/day: 1.00     Types: Cigarettes     Quit date: 2016     Years since quittin.3     Smokeless tobacco: Never Used   Vaping Use     Vaping Use: Never used   Substance and Sexual Activity     Alcohol use: No     Drug use: Never     Sexual activity: Not Currently   Other Topics Concern     Not on file   Social History Narrative    Living on [facility].  Lives by self, seperated, 2 children grown.  Disabled from COPD, Hepatitis C.    p 2013.     Social Determinants of Health     Financial Resource Strain: Not on file   Food Insecurity: Not on file   Transportation Needs: Not on file   Physical Activity: Not on file   Stress: Not on file   Social Connections: Not on file   Intimate Partner Violence: Not on file   Housing Stability: Not on file        Family History   Problem Relation Age of Onset     Heart Disease Mother         Heart Disease,valve disease/rheumatic     Other - See Comments Father         sudden death age 70     Arthritis Father         Arthritis     Cancer Brother         Cancer,Skin cancer        Immunization History   Administered Date(s) Administered     COVID-19,PF,Pfizer (12+ Yrs) 2021, 2021, 2021     Flu, Unspecified 2004, 2010     [id number] Novel Flu P-free 2010     HepB-Adult 2002, 10/08/2015, 2017     Influenza (High Dose) 3 valent vaccine 10/30/2012, 10/20/2014, 2015, 10/20/2016, 2017, 2018     Influenza (IIV3) PF 10/23/2007, 2008, 09/15/2009, 2010, 10/11/2011, 10/14/2013, 2018     Influenza, Quad, High Dose, Pf, 65yr+ (Fluzone HD) 2020, 2021     Influenza, Whole Virus 2005, 10/23/2006     Influenza,INJ,MDCK,PF,Quad >4yrs 10/07/2019     Pneumo Conj 13-V (2010&after) 10/08/2015     Pneumococcal 23 valent 2003, 2012     Tdap (Adacel,Boostrix) 2000, 2012     Zoster vaccine recombinant adjuvanted  (SHINGRIX) 10/07/2019, 12/14/2019     Zoster vaccine, live 10/30/2012       Current Outpatient Medications   Medication Sig     albuterol (PROAIR HFA/PROVENTIL HFA/VENTOLIN HFA) 108 (90 Base) MCG/ACT inhaler Inhale 2 puffs into the lungs every 6 hours as needed for shortness of breath / dyspnea     albuterol (PROVENTIL) (2.5 MG/3ML) 0.083% neb solution TAKE 3 ML BY NEBULIZATION EVERY FOUR HOURS AS NEEDED FOR SHORTNESS OF BREATH.     amLODIPine (NORVASC) 5 MG tablet Take 2.5 mg by mouth daily      aspirin EC 81 MG EC tablet Take 81 mg by mouth daily      docusate sodium (COLACE) 100 MG capsule Take 100 mg by mouth 2 times daily as needed      Fluticasone-Umeclidin-Vilanterol (TRELEGY ELLIPTA) 100-62.5-25 MCG/INH oral inhaler Inhale 1 puff into the lungs daily      lisinopril-hydrochlorothiazide (ZESTORETIC) 20-12.5 MG tablet Take 1 tablet by mouth daily      loratadine (CLARITIN) 10 MG tablet Take 10 mg by mouth 2 times daily      LORazepam (ATIVAN) 1 MG tablet Take 1 tablet (1 mg) by mouth 3 times daily as needed for anxiety     LORazepam (ATIVAN) 1 MG tablet Take 1 tablet (1 mg) by mouth 3 times daily as needed for anxiety     LORazepam (ATIVAN) 1 MG tablet Take 1 tablet (1 mg) by mouth 3 times daily as needed for anxiety     montelukast (SINGULAIR) 10 MG tablet Take 10 mg by mouth At Bedtime      multivitamin, therapeutic (THERA-VIT) TABS tablet Take 1 tablet by mouth daily     nicotine (COMMIT) 2 MG lozenge Place 2 mg inside cheek every hour as needed for smoking cessation     propranolol (INDERAL) 20 MG tablet Take 1 tablet (20 mg) by mouth 4 times daily as needed (palpitations, tremor)     Respiratory Therapy Supplies (INNOSPIRE REPLACEMENT FILTER) [facility]      Respiratory Therapy Supplies (NEBULIZER/TUBING/MOUTHPIECE) KIT Use when taking nebs     tamsulosin (FLOMAX) 0.4 MG capsule Take 0.8 mg by mouth daily      No current facility-administered medications for this visit.        Review of Systems:  I have done 10  points of review systems and all negative except for those mentioned in HPI    Physical examination  Constitutional: Oriented, not in distress  Vitals: unavailable  Eyes: No icterus, nystagmus, pupils isocoric  Head and neck: normal posture and movements  Respiratory: Normal tidal breathing, no shortness of breath, no audible wheezing or stridor over the phone or video visit  Musculoskeletal: Normal muscle mass, no deformity on hands/fingers  Integumentary:  No rash on visible skin areas on video visit  Neurological: Alert, orientedx3, no motor deficits  Psychiatric:  Mood and affect are appropriate with insight into his/her medical condition    Data:  Lab Results   Component Value Date    WBC 8.8 09/14/2021     Lab Results   Component Value Date    RBC 4.18 09/14/2021     Lab Results   Component Value Date    HGB 14.3 09/14/2021     Lab Results   Component Value Date    HCT 41.6 09/14/2021     Lab Results   Component Value Date     09/14/2021     Lab Results   Component Value Date    MCH 34.2 09/14/2021     Lab Results   Component Value Date    MCHC 34.4 09/14/2021     Lab Results   Component Value Date    RDW 12.5 09/14/2021     Lab Results   Component Value Date     09/15/2021       Lab Results   Component Value Date     09/15/2021      Lab Results   Component Value Date    POTASSIUM 3.7 09/15/2021     Lab Results   Component Value Date    CHLORIDE 107 09/15/2021     Lab Results   Component Value Date    MARCY 8.8 09/15/2021     Lab Results   Component Value Date    CO2 23 09/15/2021     Lab Results   Component Value Date    BUN 19 09/15/2021     Lab Results   Component Value Date    CR 0.80 09/15/2021    CR 0.79 09/15/2021     Lab Results   Component Value Date    GLC 87 09/15/2021         LARISA Cole MD      Evaluation Checklist for Bronchoscopic Lung Volume Reduction (Spiration Valves):    Before Referring Your Patient to Interventional Pulmonology:  []Have the studies listed below been  "completed?  []My patient meets the below mentioned inclusion criteria  []My patient does not meet any exclusion criteria     Checklist of Studies to Complete Within 12 weeks of Initial Evaluation:  []CT scan of the chest (ok if within 6 months)  []Transthoracic echocardiogram   []Arterial blood gas on room air   []6 minute walk test   []Spirometry, lung volumes, and diffusing capacity     Inclusion Criteria Checklist:  []Heterogeneous, upper lobe predominant COPD    []Completion of a comprehensive pulmonary rehabilitation program or able to ambulate >140 meters (459 feet) on 6MWT   []FEV1 ?45% of predicted value  []TLC ?100% of predicted value  []RV ?150% of predicted value  []PaCO2 ?50 mmHg  []PaO2 ?45 mmHg (on room air)  []Willing to undergo bronchoscopy and follow-up  []No co-existing medical problem that would preclude bronchoscopy  []If female with childbearing potential, negative pregnancy test result    Exclusion Criteria Checklist:  []Homogeneous emphysema as seen on CT scan of the chest  []FEV1 ?20% of predicted value with DLCO ?20%  []Active asthma, chronic bronchitis, or clinically significant bronchiectasis  []Smoking within the last 4 months  []History of recurrent infections with clinically significant sputum production  []Giant bulla (greater than 1/3 volume of lung)  []Pulmonary arterial hypertension (peak systolic pulmonary artery pressure >45 mmHg)  []Requires >6 L/min O2 to keep SpO2 ?89% at rest  []Significant comorbidity      Hernán Jerome, et al. \"A multicenter trial of an intrabronchial valve for treatment of severe emphysema.\" The Journal of thoracic and cardiovascular surgery 133.1 (2007): 65-73.    Again, thank you for allowing me to participate in the care of your patient.      Sincerely,    Fausto Cole MD      "

## 2022-01-26 NOTE — PROGRESS NOTES
Brent is a 76 year old who is being evaluated via a billable video visit.      How would you like to obtain your AVS? MyChart  If the video visit is dropped, the invitation should be resent by: Text to cell phone: [phone]  Will anyone else be joining your video visit? No      Video Start Time: 2 pm  Video-Visit Details    Type of service:  Video Visit    Video End Time: 2:30 pm    Originating Location (pt. Location): Home    Distant Location (provider location):  [facility]     Platform used for Video Visit: [facility]  Interventional Pulmonary Clinic Virtual Visit Note    January 26, 2022    Chief complaint:  Brent Villagomez is a 76 year old male seen for   Chief Complaint   Patient presents with     RECHECK       Reason for clinic visit / Chief complaint:   Severe COPD     Assessment and Plan:  # Severe COPD, evaluation for lung volume reduction.  Patient was sent by his pulmonologist for further evaluation of or his candidacy for bronchoscopic lung volume reduction.  He has had COPD at least for 10 to 15 years.  He has been on Trelegy and nebulizer (albuterol q4 hours).  He is also on home oxygen treatment as below.  We discussed bronchoscopic lung volume reduction, its pros, cons and potential (even life threatening) complications related to the procedure.  6-minute walk test (borderline at 560 ft), comprehensive PFTs are done (borderline DLCO at 23% of predicted) and aceceptable and [facility] selected CT chest done in August 2021 is acceptable for ROSIE, see picture below.  I indicated my concerns about having 2 criteria being borderline (DLCO and 6-minute walk test) as well as patient's location ([place], [place]) in case he has emergency such as tension pneumothorax after the procedure/discharged.  Patient will think about the procedure more with his family and we will talk again tomorrow to hear his decision.  If he decides to proceed with the procedure I will do  echocardiogram make sure that he does not have pulmonary hypertension or other heart conditions.  I have reviewed his EKG as well as his CT scans.    # Recent hospitalization (first time)--3-4 months ago-- for copd exacerbation. Off prednisone    # Former smoker    # Hypertension on medications    I called to finalize our discussion yesterday in the clinic. (1/27/2022)  ADDENDUM: Patient has thought about the risks and benefits of the procedure and has decided not to go through the procedure which I am in agreement.      History of Present Illness:  76 years old gentleman with known severe COPD has been on inhalers/nebulizer with limited physical capacity.  He has had PFTs done in April 2021 revealing FEV1 of 1.2 L.  His main symptom is dyspnea on exertion.  He appears to be having exacerbations averaging once a year but he has never been in the hospital or emergency room because of COPD in the past. Recent PFTs from 8/18/2021 showed FEV1 of 1.27L (44%)--severe obstruction with % increase following administration of a bronchodilator, DLCOc 23% and TLC of 119% of predicted. Same day, 6MWT showed no desaturations, covering 712 feet     HOT 2 lpm/NC sitting, 4 lpm on exertion  trelogy inhaler  Nebs--q4 hours  Prednisone use-- once a year. First hospitalization for copd exacerbation 4 month ago  Smoker 50 pack year, quit 5 years ago         Allergies   Allergen Reactions     No Clinical Screening - See Comments      Dust and pollen        Past Medical History:   Diagnosis Date     Chronic hepatitis C (H)     No Comments Provided     Disorder of porphyrin metabolism (H)     No Comments Provided     Lumbar sprain     No Comments Provided     Other and unspecified alcohol dependence, unspecified drinking behavior     No Comments Provided     Other specified chronic obstructive airways disease     No Comments Provided     Personal history of other specified diseases     occupational, hammering     Polycythemia, secondary      No Comments Provided     Unspecified visual loss     near and far        Past Surgical History:   Procedure Laterality Date     CLOSED REDUCTION ANKLE      Fx ankle with plate        Social History     Socioeconomic History     Marital status:      Spouse name: Not on file     Number of children: Not on file     Years of education: Not on file     Highest education level: Not on file   Occupational History     Not on file   Tobacco Use     Smoking status: Former Smoker     Packs/day: 1.00     Types: Cigarettes     Quit date: 2016     Years since quittin.3     Smokeless tobacco: Never Used   Vaping Use     Vaping Use: Never used   Substance and Sexual Activity     Alcohol use: No     Drug use: Never     Sexual activity: Not Currently   Other Topics Concern     Not on file   Social History Narrative    Living [place] [address] [facility] [address] [address] [place] [facility].  Lives by self, seperated, 2 children grown.  Disabled from COPD, Hepatitis C.    p 2013.     Social Determinants of Health     Financial Resource Strain: Not on file   Food Insecurity: Not on file   Transportation Needs: Not on file   Physical Activity: Not on file   Stress: Not on file   Social Connections: Not on file   Intimate Partner Violence: Not on file   Housing Stability: Not on file        Family History   Problem Relation Age of Onset     Heart Disease Mother         Heart Disease,valve disease/rheumatic     Other - See Comments Father         sudden death age 70     Arthritis Father         Arthritis     Cancer Brother         Cancer,Skin cancer        Immunization History   Administered Date(s) Administered     COVID-19,PF,Pfizer (12+ Yrs) 2021, 2021, 2021     Flu, Unspecified 2004, 2010     [id number] Novel Flu P-free 2010     HepB-Adult 2002, 10/08/2015, 2017     Influenza (High Dose) 3 valent vaccine 10/30/2012, 10/20/2014, 2015, 10/20/2016, 2017, 2018     Influenza  (IIV3) PF 10/23/2007, 11/19/2008, 09/15/2009, 09/27/2010, 10/11/2011, 10/14/2013, 11/27/2018     Influenza, Quad, High Dose, Pf, 65yr+ (Fluzone HD) 09/22/2020, 09/22/2021     Influenza, Whole Virus 11/17/2005, 10/23/2006     Influenza,INJ,MDCK,PF,Quad >4yrs 10/07/2019     Pneumo Conj 13-V (2010&after) 10/08/2015     Pneumococcal 23 valent 01/28/2003, 03/27/2012     Tdap (Adacel,Boostrix) 06/22/2000, 02/14/2012     Zoster vaccine recombinant adjuvanted (SHINGRIX) 10/07/2019, 12/14/2019     Zoster vaccine, live 10/30/2012       Current Outpatient Medications   Medication Sig     albuterol (PROAIR HFA/PROVENTIL HFA/VENTOLIN HFA) 108 (90 Base) MCG/ACT inhaler Inhale 2 puffs into the lungs every 6 hours as needed for shortness of breath / dyspnea     albuterol (PROVENTIL) (2.5 MG/3ML) 0.083% neb solution TAKE 3 ML BY NEBULIZATION EVERY FOUR HOURS AS NEEDED FOR SHORTNESS OF BREATH.     amLODIPine (NORVASC) 5 MG tablet Take 2.5 mg by mouth daily      aspirin EC 81 MG EC tablet Take 81 mg by mouth daily      docusate sodium (COLACE) 100 MG capsule Take 100 mg by mouth 2 times daily as needed      Fluticasone-Umeclidin-Vilanterol (TRELEGY ELLIPTA) 100-62.5-25 MCG/INH oral inhaler Inhale 1 puff into the lungs daily      lisinopril-hydrochlorothiazide (ZESTORETIC) 20-12.5 MG tablet Take 1 tablet by mouth daily      loratadine (CLARITIN) 10 MG tablet Take 10 mg by mouth 2 times daily      LORazepam (ATIVAN) 1 MG tablet Take 1 tablet (1 mg) by mouth 3 times daily as needed for anxiety     LORazepam (ATIVAN) 1 MG tablet Take 1 tablet (1 mg) by mouth 3 times daily as needed for anxiety     LORazepam (ATIVAN) 1 MG tablet Take 1 tablet (1 mg) by mouth 3 times daily as needed for anxiety     montelukast (SINGULAIR) 10 MG tablet Take 10 mg by mouth At Bedtime      multivitamin, therapeutic (THERA-VIT) TABS tablet Take 1 tablet by mouth daily     nicotine (COMMIT) 2 MG lozenge Place 2 mg inside cheek every hour as needed for smoking  cessation     propranolol (INDERAL) 20 MG tablet Take 1 tablet (20 mg) by mouth 4 times daily as needed (palpitations, tremor)     Respiratory Therapy Supplies (INNOSPIRE REPLACEMENT FILTER) [facility]      Respiratory Therapy Supplies (NEBULIZER/TUBING/MOUTHPIECE) KIT Use when taking nebs     tamsulosin (FLOMAX) 0.4 MG capsule Take 0.8 mg by mouth daily      No current facility-administered medications for this visit.        Review of Systems:  I have done 10 points of review systems and all negative except for those mentioned in HPI    Physical examination  Constitutional: Oriented, not in distress  Vitals: unavailable  Eyes: No icterus, nystagmus, pupils isocoric  Head and neck: normal posture and movements  Respiratory: Normal tidal breathing, no shortness of breath, no audible wheezing or stridor over the phone or video visit  Musculoskeletal: Normal muscle mass, no deformity on hands/fingers  Integumentary:  No rash on visible skin areas on video visit  Neurological: Alert, orientedx3, no motor deficits  Psychiatric:  Mood and affect are appropriate with insight into his/her medical condition    Data:  Lab Results   Component Value Date    WBC 8.8 09/14/2021     Lab Results   Component Value Date    RBC 4.18 09/14/2021     Lab Results   Component Value Date    HGB 14.3 09/14/2021     Lab Results   Component Value Date    HCT 41.6 09/14/2021     Lab Results   Component Value Date     09/14/2021     Lab Results   Component Value Date    MCH 34.2 09/14/2021     Lab Results   Component Value Date    MCHC 34.4 09/14/2021     Lab Results   Component Value Date    RDW 12.5 09/14/2021     Lab Results   Component Value Date     09/15/2021       Lab Results   Component Value Date     09/15/2021      Lab Results   Component Value Date    POTASSIUM 3.7 09/15/2021     Lab Results   Component Value Date    CHLORIDE 107 09/15/2021     Lab Results   Component Value Date    MARCY 8.8 09/15/2021     Lab Results    Component Value Date    CO2 23 09/15/2021     Lab Results   Component Value Date    BUN 19 09/15/2021     Lab Results   Component Value Date    CR 0.80 09/15/2021    CR 0.79 09/15/2021     Lab Results   Component Value Date    GLC 87 09/15/2021         LARISA Cole MD      Evaluation Checklist for Bronchoscopic Lung Volume Reduction (Spiration Valves):    Before Referring Your Patient to Interventional Pulmonology:  []Have the studies listed below been completed?  []My patient meets the below mentioned inclusion criteria  []My patient does not meet any exclusion criteria     Checklist of Studies to Complete Within 12 weeks of Initial Evaluation:  []CT scan of the chest (ok if within 6 months)  []Transthoracic echocardiogram   []Arterial blood gas on room air   []6 minute walk test   []Spirometry, lung volumes, and diffusing capacity     Inclusion Criteria Checklist:  []Heterogeneous, upper lobe predominant COPD    []Completion of a comprehensive pulmonary rehabilitation program or able to ambulate >140 meters (459 feet) on 6MWT   []FEV1 ?45% of predicted value  []TLC ?100% of predicted value  []RV ?150% of predicted value  []PaCO2 ?50 mmHg  []PaO2 ?45 mmHg (on room air)  []Willing to undergo bronchoscopy and follow-up  []No co-existing medical problem that would preclude bronchoscopy  []If female with childbearing potential, negative pregnancy test result    Exclusion Criteria Checklist:  []Homogeneous emphysema as seen on CT scan of the chest  []FEV1 ?20% of predicted value with DLCO ?20%  []Active asthma, chronic bronchitis, or clinically significant bronchiectasis  []Smoking within the last 4 months  []History of recurrent infections with clinically significant sputum production  []Giant bulla (greater than 1/3 volume of lung)  []Pulmonary arterial hypertension (peak systolic pulmonary artery pressure >45 mmHg)  []Requires >6 L/min O2 to keep SpO2 ?89% at rest  []Significant comorbidity      Hernán Jerome  "MIKEY et al. \"A multicenter trial of an intrabronchial valve for treatment of severe emphysema.\" The Journal of thoracic and cardiovascular surgery 133.1 (2007): 65-73.                                              "

## 2022-08-12 ENCOUNTER — APPOINTMENT (OUTPATIENT)
Dept: CT IMAGING | Facility: OTHER | Age: 77
DRG: 193 | End: 2022-08-12
Attending: PHYSICIAN ASSISTANT
Payer: COMMERCIAL

## 2022-08-12 ENCOUNTER — APPOINTMENT (OUTPATIENT)
Dept: GENERAL RADIOLOGY | Facility: OTHER | Age: 77
DRG: 193 | End: 2022-08-12
Attending: PHYSICIAN ASSISTANT
Payer: COMMERCIAL

## 2022-08-12 ENCOUNTER — HOSPITAL ENCOUNTER (INPATIENT)
Facility: OTHER | Age: 77
LOS: 5 days | Discharge: HOME | DRG: 193 | End: 2022-08-17
Attending: PHYSICIAN ASSISTANT | Admitting: FAMILY MEDICINE
Payer: COMMERCIAL

## 2022-08-12 ENCOUNTER — APPOINTMENT (OUTPATIENT)
Dept: ULTRASOUND IMAGING | Facility: OTHER | Age: 77
DRG: 193 | End: 2022-08-12
Attending: FAMILY MEDICINE
Payer: COMMERCIAL

## 2022-08-12 DIAGNOSIS — J44.9 STAGE 3 SEVERE COPD BY GOLD CLASSIFICATION (H): ICD-10-CM

## 2022-08-12 DIAGNOSIS — Z11.52 ENCOUNTER FOR SCREENING LABORATORY TESTING FOR COVID-19 VIRUS: ICD-10-CM

## 2022-08-12 DIAGNOSIS — S20.212A CHEST WALL CONTUSION, LEFT, INITIAL ENCOUNTER: ICD-10-CM

## 2022-08-12 DIAGNOSIS — S22.42XA CLOSED FRACTURE OF MULTIPLE RIBS OF LEFT SIDE, INITIAL ENCOUNTER: ICD-10-CM
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
## 2022-08-12 DIAGNOSIS — J18.9 UNRESOLVED PNEUMONIA: ICD-10-CM

## 2022-08-12 DIAGNOSIS — R07.89 LEFT-SIDED CHEST WALL PAIN: ICD-10-CM

## 2022-08-12 DIAGNOSIS — S20.212A CONTUSION OF LEFT FRONT WALL OF THORAX, INITIAL ENCOUNTER: ICD-10-CM

## 2022-08-12 DIAGNOSIS — J96.21 ACUTE ON CHRONIC RESPIRATORY FAILURE WITH HYPOXIA (H): ICD-10-CM

## 2022-08-12 DIAGNOSIS — J44.1 COPD EXACERBATION (H): ICD-10-CM

## 2022-08-12 DIAGNOSIS — J18.9 PNEUMONIA OF LEFT LUNG DUE TO INFECTIOUS ORGANISM, UNSPECIFIED PART OF LUNG: Primary | ICD-10-CM

## 2022-08-12 DIAGNOSIS — Z87.891 PERSONAL HISTORY OF TOBACCO USE, PRESENTING HAZARDS TO HEALTH: ICD-10-CM

## 2022-08-12 DIAGNOSIS — R76.11 NONSPECIFIC REACTION TO TUBERCULIN TEST: ICD-10-CM

## 2022-08-12 DIAGNOSIS — J44.89 OBSTRUCTIVE CHRONIC BRONCHITIS WITHOUT EXACERBATION (H): ICD-10-CM

## 2022-08-12 DIAGNOSIS — R10.12 ABDOMINAL PAIN, LEFT UPPER QUADRANT: ICD-10-CM

## 2022-08-12 DIAGNOSIS — R00.1 BRADYCARDIA: ICD-10-CM

## 2022-08-12 DIAGNOSIS — J18.9 COMMUNITY ACQUIRED PNEUMONIA OF LEFT LUNG, UNSPECIFIED PART OF LUNG: ICD-10-CM

## 2022-08-12 DIAGNOSIS — R10.12 ABDOMINAL WALL PAIN IN LEFT UPPER QUADRANT: ICD-10-CM

## 2022-08-12 DIAGNOSIS — J18.9 PNEUMONIA OF LEFT LOWER LOBE DUE TO INFECTIOUS ORGANISM: ICD-10-CM

## 2022-08-12 DIAGNOSIS — R00.1 SEVERE SINUS BRADYCARDIA: ICD-10-CM

## 2022-08-12 DIAGNOSIS — R55 SYNCOPE, UNSPECIFIED SYNCOPE TYPE: ICD-10-CM

## 2022-08-12 DIAGNOSIS — Z79.899 OTHER LONG TERM (CURRENT) DRUG THERAPY: ICD-10-CM

## 2022-08-12 PROBLEM — F10.21 ALCOHOL DEPENDENCE IN REMISSION (H): Status: ACTIVE | Noted: 2021-09-23

## 2022-08-12 PROBLEM — R09.02 HYPOXIA: Status: RESOLVED | Noted: 2021-09-11 | Resolved: 2022-08-12

## 2022-08-12 PROBLEM — S22.49XA CLOSED FRACTURE OF MULTIPLE RIBS: Status: ACTIVE | Noted: 2022-08-12

## 2022-08-12 LAB
ALBUMIN SERPL-MCNC: 4.3 G/DL (ref 3.5–5.7)
ALP SERPL-CCNC: 66 U/L (ref 34–104)
ALT SERPL W P-5'-P-CCNC: 21 U/L (ref 7–52)
ANION GAP SERPL CALCULATED.3IONS-SCNC: 9 MMOL/L (ref 3–14)
AST SERPL W P-5'-P-CCNC: 22 U/L (ref 13–39)
BASOPHILS # BLD AUTO: 0 10E3/UL (ref 0–0.2)
BASOPHILS NFR BLD AUTO: 1 %
BILIRUB SERPL-MCNC: 0.9 MG/DL (ref 0.3–1)
BUN SERPL-MCNC: 27 MG/DL (ref 7–25)
CALCIUM SERPL-MCNC: 10.3 MG/DL (ref 8.6–10.3)
CHLORIDE BLD-SCNC: 100 MMOL/L (ref 98–107)
CO2 SERPL-SCNC: 28 MMOL/L (ref 21–31)
CREAT SERPL-MCNC: 1.15 MG/DL (ref 0.7–1.3)
CRP SERPL-MCNC: 14.1 MG/L
EOSINOPHIL # BLD AUTO: 0.1 10E3/UL (ref 0–0.7)
EOSINOPHIL NFR BLD AUTO: 1 %
ERYTHROCYTE [DISTWIDTH] IN BLOOD BY AUTOMATED COUNT: 12.2 % (ref 10–15)
FLUAV RNA SPEC QL NAA+PROBE: NEGATIVE
FLUBV RNA RESP QL NAA+PROBE: NEGATIVE
GFR SERPL CREATININE-BSD FRML MDRD: 66 ML/MIN/1.73M2
GLUCOSE BLD-MCNC: 108 MG/DL (ref 70–105)
GLUCOSE BLDC GLUCOMTR-MCNC: 110 MG/DL (ref 70–99)
HCT VFR BLD AUTO: 47.3 % (ref 40–53)
HGB BLD-MCNC: 17 G/DL (ref 13.3–17.7)
IMM GRANULOCYTES # BLD: 0 10E3/UL
IMM GRANULOCYTES NFR BLD: 0 %
INR PPP: 1.02 (ref 0.85–1.15)
LACTATE SERPL-SCNC: 2.1 MMOL/L (ref 0.7–2)
LYMPHOCYTES # BLD AUTO: 1.1 10E3/UL (ref 0.8–5.3)
LYMPHOCYTES NFR BLD AUTO: 13 %
MAGNESIUM SERPL-MCNC: 1.9 MG/DL (ref 1.9–2.7)
MCH RBC QN AUTO: 35 PG (ref 26.5–33)
MCHC RBC AUTO-ENTMCNC: 35.9 G/DL (ref 31.5–36.5)
MCV RBC AUTO: 97 FL (ref 78–100)
MONOCYTES # BLD AUTO: 0.8 10E3/UL (ref 0–1.3)
MONOCYTES NFR BLD AUTO: 10 %
NEUTROPHILS # BLD AUTO: 6.5 10E3/UL (ref 1.6–8.3)
NEUTROPHILS NFR BLD AUTO: 75 %
NRBC # BLD AUTO: 0 10E3/UL
NRBC BLD AUTO-RTO: 0 /100
NT-PROBNP SERPL-MCNC: 82 PG/ML (ref 0–100)
PLATELET # BLD AUTO: 179 10E3/UL (ref 150–450)
POTASSIUM BLD-SCNC: 4.7 MMOL/L (ref 3.5–5.1)
PROCALCITONIN SERPL-MCNC: <0.5 NG/ML
PROT SERPL-MCNC: 7.2 G/DL (ref 6.4–8.9)
RBC # BLD AUTO: 4.86 10E6/UL (ref 4.4–5.9)
RSV RNA SPEC NAA+PROBE: NEGATIVE
SARS-COV-2 RNA RESP QL NAA+PROBE: NEGATIVE
SODIUM SERPL-SCNC: 137 MMOL/L (ref 134–144)
TROPONIN I SERPL-MCNC: 5.3 PG/ML (ref 0–34)
WBC # BLD AUTO: 8.6 10E3/UL (ref 4–11)

## 2022-08-12 PROCEDURE — 250N000011 HC RX IP 250 OP 636: Performed by: FAMILY MEDICINE

## 2022-08-12 PROCEDURE — 258N000003 HC RX IP 258 OP 636: Performed by: FAMILY MEDICINE

## 2022-08-12 PROCEDURE — 84484 ASSAY OF TROPONIN QUANT: CPT | Performed by: PHYSICIAN ASSISTANT

## 2022-08-12 PROCEDURE — 250N000011 HC RX IP 250 OP 636: Performed by: PHYSICIAN ASSISTANT

## 2022-08-12 PROCEDURE — 99223 1ST HOSP IP/OBS HIGH 75: CPT | Mod: AI | Performed by: FAMILY MEDICINE

## 2022-08-12 PROCEDURE — 94640 AIRWAY INHALATION TREATMENT: CPT

## 2022-08-12 PROCEDURE — 83880 ASSAY OF NATRIURETIC PEPTIDE: CPT | Performed by: PHYSICIAN ASSISTANT

## 2022-08-12 PROCEDURE — 74177 CT ABD & PELVIS W/CONTRAST: CPT

## 2022-08-12 PROCEDURE — C9803 HOPD COVID-19 SPEC COLLECT: HCPCS | Performed by: PHYSICIAN ASSISTANT

## 2022-08-12 PROCEDURE — 71045 X-RAY EXAM CHEST 1 VIEW: CPT

## 2022-08-12 PROCEDURE — 93880 EXTRACRANIAL BILAT STUDY: CPT

## 2022-08-12 PROCEDURE — 96365 THER/PROPH/DIAG IV INF INIT: CPT | Mod: XU | Performed by: PHYSICIAN ASSISTANT

## 2022-08-12 PROCEDURE — 86140 C-REACTIVE PROTEIN: CPT | Performed by: PHYSICIAN ASSISTANT

## 2022-08-12 PROCEDURE — 80053 COMPREHEN METABOLIC PANEL: CPT | Performed by: PHYSICIAN ASSISTANT

## 2022-08-12 PROCEDURE — 84145 PROCALCITONIN (PCT): CPT | Performed by: PHYSICIAN ASSISTANT

## 2022-08-12 PROCEDURE — 120N000001 HC R&B MED SURG/OB

## 2022-08-12 PROCEDURE — 83605 ASSAY OF LACTIC ACID: CPT | Performed by: PHYSICIAN ASSISTANT

## 2022-08-12 PROCEDURE — 85610 PROTHROMBIN TIME: CPT | Performed by: PHYSICIAN ASSISTANT

## 2022-08-12 PROCEDURE — 85025 COMPLETE CBC W/AUTO DIFF WBC: CPT | Performed by: PHYSICIAN ASSISTANT

## 2022-08-12 PROCEDURE — 83735 ASSAY OF MAGNESIUM: CPT | Performed by: PHYSICIAN ASSISTANT

## 2022-08-12 PROCEDURE — 250N000013 HC RX MED GY IP 250 OP 250 PS 637: Performed by: FAMILY MEDICINE

## 2022-08-12 PROCEDURE — 71250 CT THORAX DX C-: CPT

## 2022-08-12 PROCEDURE — 93005 ELECTROCARDIOGRAM TRACING: CPT | Performed by: PHYSICIAN ASSISTANT

## 2022-08-12 PROCEDURE — 93010 ELECTROCARDIOGRAM REPORT: CPT | Performed by: INTERNAL MEDICINE

## 2022-08-12 PROCEDURE — 250N000009 HC RX 250: Performed by: FAMILY MEDICINE

## 2022-08-12 PROCEDURE — 99285 EMERGENCY DEPT VISIT HI MDM: CPT | Performed by: PHYSICIAN ASSISTANT

## 2022-08-12 PROCEDURE — 87637 SARSCOV2&INF A&B&RSV AMP PRB: CPT | Performed by: PHYSICIAN ASSISTANT

## 2022-08-12 PROCEDURE — 87040 BLOOD CULTURE FOR BACTERIA: CPT | Performed by: PHYSICIAN ASSISTANT

## 2022-08-12 PROCEDURE — 36415 COLL VENOUS BLD VENIPUNCTURE: CPT | Performed by: PHYSICIAN ASSISTANT

## 2022-08-12 PROCEDURE — 99285 EMERGENCY DEPT VISIT HI MDM: CPT | Mod: 25 | Performed by: PHYSICIAN ASSISTANT

## 2022-08-12 PROCEDURE — 258N000003 HC RX IP 258 OP 636: Performed by: PHYSICIAN ASSISTANT

## 2022-08-12 PROCEDURE — 96375 TX/PRO/DX INJ NEW DRUG ADDON: CPT | Performed by: PHYSICIAN ASSISTANT

## 2022-08-12 RX ORDER — HYDROMORPHONE HYDROCHLORIDE 1 MG/ML
0.5 INJECTION, SOLUTION INTRAMUSCULAR; INTRAVENOUS; SUBCUTANEOUS
Status: DISCONTINUED | OUTPATIENT
Start: 2022-08-12 | End: 2022-08-17 | Stop reason: HOSPADM

## 2022-08-12 RX ORDER — ACETAMINOPHEN 325 MG/1
650 TABLET ORAL EVERY 6 HOURS PRN
Status: DISCONTINUED | OUTPATIENT
Start: 2022-08-12 | End: 2022-08-13

## 2022-08-12 RX ORDER — LISINOPRIL 20 MG/1
20 TABLET ORAL DAILY
Status: DISCONTINUED | OUTPATIENT
Start: 2022-08-13 | End: 2022-08-12

## 2022-08-12 RX ORDER — NALOXONE HYDROCHLORIDE 0.4 MG/ML
0.2 INJECTION, SOLUTION INTRAMUSCULAR; INTRAVENOUS; SUBCUTANEOUS
Status: DISCONTINUED | OUTPATIENT
Start: 2022-08-12 | End: 2022-08-17 | Stop reason: HOSPADM

## 2022-08-12 RX ORDER — ALBUTEROL SULFATE 90 UG/1
2 AEROSOL, METERED RESPIRATORY (INHALATION) EVERY 6 HOURS PRN
Status: DISCONTINUED | OUTPATIENT
Start: 2022-08-12 | End: 2022-08-12 | Stop reason: CLARIF

## 2022-08-12 RX ORDER — LISINOPRIL AND HYDROCHLOROTHIAZIDE 12.5; 2 MG/1; MG/1
1 TABLET ORAL DAILY
Status: DISCONTINUED | OUTPATIENT
Start: 2022-08-12 | End: 2022-08-12 | Stop reason: CLARIF

## 2022-08-12 RX ORDER — HYDROMORPHONE HYDROCHLORIDE 2 MG/1
2 TABLET ORAL EVERY 4 HOURS PRN
Status: DISCONTINUED | OUTPATIENT
Start: 2022-08-12 | End: 2022-08-17 | Stop reason: HOSPADM

## 2022-08-12 RX ORDER — ACETAMINOPHEN 650 MG/1
650 SUPPOSITORY RECTAL EVERY 6 HOURS PRN
Status: DISCONTINUED | OUTPATIENT
Start: 2022-08-12 | End: 2022-08-13

## 2022-08-12 RX ORDER — IOPAMIDOL 755 MG/ML
92 INJECTION, SOLUTION INTRAVASCULAR ONCE
Status: COMPLETED | OUTPATIENT
Start: 2022-08-12 | End: 2022-08-12

## 2022-08-12 RX ORDER — CEFTRIAXONE SODIUM 2 G/50ML
2 INJECTION, SOLUTION INTRAVENOUS ONCE
Status: COMPLETED | OUTPATIENT
Start: 2022-08-12 | End: 2022-08-12

## 2022-08-12 RX ORDER — ONDANSETRON 4 MG/1
4 TABLET, ORALLY DISINTEGRATING ORAL EVERY 6 HOURS PRN
Status: DISCONTINUED | OUTPATIENT
Start: 2022-08-12 | End: 2022-08-17 | Stop reason: HOSPADM

## 2022-08-12 RX ORDER — FENTANYL CITRATE 50 UG/ML
50 INJECTION, SOLUTION INTRAMUSCULAR; INTRAVENOUS EVERY 30 MIN PRN
Status: DISCONTINUED | OUTPATIENT
Start: 2022-08-12 | End: 2022-08-12

## 2022-08-12 RX ORDER — ENOXAPARIN SODIUM 100 MG/ML
40 INJECTION SUBCUTANEOUS EVERY 24 HOURS
Status: DISCONTINUED | OUTPATIENT
Start: 2022-08-12 | End: 2022-08-17 | Stop reason: HOSPADM

## 2022-08-12 RX ORDER — ALBUTEROL SULFATE 0.83 MG/ML
3 SOLUTION RESPIRATORY (INHALATION)
Status: DISCONTINUED | OUTPATIENT
Start: 2022-08-12 | End: 2022-08-16 | Stop reason: ALTCHOICE

## 2022-08-12 RX ORDER — MONTELUKAST SODIUM 5 MG/1
10 TABLET, CHEWABLE ORAL AT BEDTIME
Status: DISCONTINUED | OUTPATIENT
Start: 2022-08-12 | End: 2022-08-17 | Stop reason: HOSPADM

## 2022-08-12 RX ORDER — SODIUM CHLORIDE 9 MG/ML
INJECTION, SOLUTION INTRAVENOUS CONTINUOUS
Status: DISCONTINUED | OUTPATIENT
Start: 2022-08-12 | End: 2022-08-13

## 2022-08-12 RX ORDER — AZITHROMYCIN 500 MG/5ML
500 INJECTION, POWDER, LYOPHILIZED, FOR SOLUTION INTRAVENOUS EVERY 24 HOURS
Status: COMPLETED | OUTPATIENT
Start: 2022-08-12 | End: 2022-08-12

## 2022-08-12 RX ORDER — NALOXONE HYDROCHLORIDE 0.4 MG/ML
0.4 INJECTION, SOLUTION INTRAMUSCULAR; INTRAVENOUS; SUBCUTANEOUS
Status: DISCONTINUED | OUTPATIENT
Start: 2022-08-12 | End: 2022-08-17 | Stop reason: HOSPADM

## 2022-08-12 RX ORDER — LISINOPRIL 20 MG/1
20 TABLET ORAL DAILY
Status: DISCONTINUED | OUTPATIENT
Start: 2022-08-13 | End: 2022-08-17 | Stop reason: HOSPADM

## 2022-08-12 RX ORDER — CEFTRIAXONE SODIUM 2 G/50ML
2 INJECTION, SOLUTION INTRAVENOUS EVERY 24 HOURS
Status: DISCONTINUED | OUTPATIENT
Start: 2022-08-13 | End: 2022-08-16

## 2022-08-12 RX ORDER — ONDANSETRON 2 MG/ML
4 INJECTION INTRAMUSCULAR; INTRAVENOUS EVERY 6 HOURS PRN
Status: DISCONTINUED | OUTPATIENT
Start: 2022-08-12 | End: 2022-08-17 | Stop reason: HOSPADM

## 2022-08-12 RX ORDER — ALBUTEROL SULFATE 0.83 MG/ML
2.5 SOLUTION RESPIRATORY (INHALATION)
Status: DISCONTINUED | OUTPATIENT
Start: 2022-08-12 | End: 2022-08-14

## 2022-08-12 RX ORDER — HYDROCHLOROTHIAZIDE 12.5 MG/1
12.5 CAPSULE ORAL DAILY
Status: DISCONTINUED | OUTPATIENT
Start: 2022-08-13 | End: 2022-08-13

## 2022-08-12 RX ORDER — AMLODIPINE BESYLATE 2.5 MG/1
2.5 TABLET ORAL DAILY
Status: DISCONTINUED | OUTPATIENT
Start: 2022-08-13 | End: 2022-08-17 | Stop reason: HOSPADM

## 2022-08-12 RX ORDER — LIDOCAINE 40 MG/G
CREAM TOPICAL
Status: DISCONTINUED | OUTPATIENT
Start: 2022-08-12 | End: 2022-08-17 | Stop reason: HOSPADM

## 2022-08-12 RX ORDER — SODIUM CHLORIDE 9 MG/ML
INJECTION, SOLUTION INTRAVENOUS CONTINUOUS
Status: DISCONTINUED | OUTPATIENT
Start: 2022-08-12 | End: 2022-08-12

## 2022-08-12 RX ORDER — GUAIFENESIN/DEXTROMETHORPHAN 100-10MG/5
10 SYRUP ORAL EVERY 4 HOURS PRN
Status: DISCONTINUED | OUTPATIENT
Start: 2022-08-12 | End: 2022-08-17 | Stop reason: HOSPADM

## 2022-08-12 RX ORDER — ASPIRIN 81 MG/1
81 TABLET ORAL DAILY
Status: DISCONTINUED | OUTPATIENT
Start: 2022-08-13 | End: 2022-08-17 | Stop reason: HOSPADM

## 2022-08-12 RX ORDER — SENNOSIDES 8.6 MG
8.6 TABLET ORAL 2 TIMES DAILY PRN
Status: DISCONTINUED | OUTPATIENT
Start: 2022-08-12 | End: 2022-08-17 | Stop reason: HOSPADM

## 2022-08-12 RX ADMIN — SODIUM CHLORIDE: 9 INJECTION, SOLUTION INTRAVENOUS at 19:54

## 2022-08-12 RX ADMIN — AZITHROMYCIN MONOHYDRATE 500 MG: 500 INJECTION, POWDER, LYOPHILIZED, FOR SOLUTION INTRAVENOUS at 17:47

## 2022-08-12 RX ADMIN — FENTANYL CITRATE 50 MCG: 50 INJECTION, SOLUTION INTRAMUSCULAR; INTRAVENOUS at 15:04

## 2022-08-12 RX ADMIN — ACETAMINOPHEN 650 MG: 325 TABLET, FILM COATED ORAL at 18:29

## 2022-08-12 RX ADMIN — ONDANSETRON 4 MG: 4 TABLET, ORALLY DISINTEGRATING ORAL at 19:52

## 2022-08-12 RX ADMIN — MONTELUKAST SODIUM 10 MG: 5 TABLET, CHEWABLE ORAL at 22:46

## 2022-08-12 RX ADMIN — IOPAMIDOL 92 ML: 755 INJECTION, SOLUTION INTRAVENOUS at 15:32

## 2022-08-12 RX ADMIN — SODIUM CHLORIDE 1000 ML: 0.9 INJECTION, SOLUTION INTRAVENOUS at 16:19

## 2022-08-12 RX ADMIN — GUAIFENESIN AND DEXTROMETHORPHAN HYDROBROMIDE 10 ML: 10; 100 SYRUP ORAL at 18:29

## 2022-08-12 RX ADMIN — HYDROMORPHONE HYDROCHLORIDE 2 MG: 2 TABLET ORAL at 20:42

## 2022-08-12 RX ADMIN — ENOXAPARIN SODIUM 40 MG: 40 INJECTION SUBCUTANEOUS at 22:47

## 2022-08-12 RX ADMIN — SODIUM CHLORIDE: 9 INJECTION, SOLUTION INTRAVENOUS at 15:41

## 2022-08-12 RX ADMIN — CEFTRIAXONE SODIUM 2 G: 2 INJECTION, SOLUTION INTRAVENOUS at 16:30

## 2022-08-12 RX ADMIN — ALBUTEROL SULFATE 2.5 MG: 2.5 SOLUTION RESPIRATORY (INHALATION) at 22:51

## 2022-08-12 RX ADMIN — HYDROMORPHONE HYDROCHLORIDE 0.5 MG: 1 INJECTION, SOLUTION INTRAMUSCULAR; INTRAVENOUS; SUBCUTANEOUS at 18:30

## 2022-08-12 ASSESSMENT — ACTIVITIES OF DAILY LIVING (ADL)
ADLS_ACUITY_SCORE: 35
ADLS_ACUITY_SCORE: 23
ADLS_ACUITY_SCORE: 23
ADLS_ACUITY_SCORE: 37
ADLS_ACUITY_SCORE: 22

## 2022-08-12 ASSESSMENT — ENCOUNTER SYMPTOMS
AGITATION: 0
SEIZURES: 0
CONFUSION: 0
FACIAL ASYMMETRY: 0
FEVER: 0
TREMORS: 0
FLANK PAIN: 0
BACK PAIN: 0
EYE PAIN: 0
STRIDOR: 0
ABDOMINAL PAIN: 1
FACIAL SWELLING: 0

## 2022-08-12 NOTE — ED NOTES
Report given to Rosaura CASTELLON pt transferring to [facility] room 350. No questions or concerns from receiving facility

## 2022-08-12 NOTE — ED PROVIDER NOTES
History     Chief Complaint   Patient presents with     Fall     Syncope     Rib Pain     HPI  Brent Villagomez is a 77 year old male who reports that he had a syncopal episode 2 days ago when he fell hitting his left chest wall/left abdominal area on a chair.  He was reported by family to be out for at least 10-15 seconds.  He has been reluctant to come in, however he has been having increasing shortness of breath and left chest wall pain.  He has significant pleuritic pain.  He has pain with any motion.  Denies any lightheadedness or dizziness.  No nausea or vomiting.  Denies any head injury.  No diarrhea or constipation.  Past medical history is significant for stage III severe COPD, positive PPD, multiple pulmonary nodules, colon polyps, alcohol dependence in remission, BPH, acute on chronic respiratory failure with hypoxia, pneumonia of the right lower lobe, hypoxia tobacco user polycythemia, obesity, hypertension, and chronic bronchitis    Allergies:  Allergies   Allergen Reactions     No Clinical Screening - See Comments      Dust and pollen       Problem List:    Patient Active Problem List    Diagnosis Date Noted     Alcohol dependence in remission (H) 09/23/2021     Priority: Medium     Pain medication agreement-nonopioid 9-23-21 09/23/2021     Priority: Medium     Other long term (current) drug therapy 09/23/2021     Priority: Medium     Acute on chronic respiratory failure with hypoxia (H) 09/12/2021     Priority: Medium     Tachypnea 09/11/2021     Priority: Medium     Hypoxia 09/11/2021     Priority: Medium     COPD exacerbation (H) 09/11/2021     Priority: Medium     Pneumonia of right lower lobe due to infectious organism 09/11/2021     Priority: Medium     Health care directive on file 12/02/2020     Priority: Medium     Benign prostatic hyperplasia with lower urinary tract symptoms, symptom details unspecified 08/06/2019     Priority: Medium     Multiple pulmonary nodules 06/20/2018     Priority:  Medium     COPD with emphysema (H) 2015     Priority: Medium     PCT (porphyria cutanea tarda) (H) 2012     Priority: Medium     Formatting of this note might be different from the original.  2000 dx Dr Hernandez felt due to Hepatitis C liver disease; treated with iron unloading phlebotomies       Colon polyps 2012     Priority: Medium     Formatting of this note might be different from the original.  Tubular adenomas cecum and descending colon in .  2008 colonoscopy and 4 polyps removed by Dr Bob Raya at [facility] in [place], [place] - path = tubular adenomas.  Colonoscopy 2013 Dr Bonds - 2 TAs.       Family history of abdominal aortic aneurysm 2012     Priority: Medium     Formatting of this note might be different from the original.  Brother  age 64.  Abd aorta screening 2012 normal.       Positive PPD, treated 2012     Priority: Medium     Formatting of this note might be different from the original.  9 months INH preventive treatment in [facility] in his early 30s.       Stage 3 severe COPD by GOLD classification (H) 2012     Priority: Medium     Obesity 09/15/2009     Priority: Medium     Lichenification 2008     Priority: Medium     Benign neoplasm of colon 2008     Priority: Medium     Polycythemia, secondary 2008     Priority: Medium     Major depressive disorder, recurrent episode, moderate (H) 2007     Priority: Medium     Osteoarthritis, hand 2006     Priority: Medium     Tobacco user 05/10/2006     Priority: Medium     Degeneration of lumbar or lumbosacral intervertebral disc 2006     Priority: Medium     Actinic keratosis 2005     Priority: Medium     Obstructive chronic bronchitis without exacerbation (H) 2003     Priority: Medium     Disorder of porphyrin metabolism (H) 2003     Priority: Medium     Acute hepatitis C virus infection 2000     Priority: Medium      Hypertension 2000     Priority: Medium        Past Medical History:    Past Medical History:   Diagnosis Date     Chronic hepatitis C (H)      Disorder of porphyrin metabolism (H)      Lumbar sprain      Other and unspecified alcohol dependence, unspecified drinking behavior      Other specified chronic obstructive airways disease      Personal history of other specified diseases      Polycythemia, secondary      Unspecified visual loss        Past Surgical History:    Past Surgical History:   Procedure Laterality Date     CLOSED REDUCTION ANKLE      Fx ankle with plate       Family History:    Family History   Problem Relation Age of Onset     Heart Disease Mother         Heart Disease,valve disease/rheumatic     Other - See Comments Father         sudden death age 70     Arthritis Father         Arthritis     Cancer Brother         Cancer,Skin cancer       Social History:  Marital Status:   [2]  Social History     Tobacco Use     Smoking status: Former Smoker     Packs/day: 1.00     Types: Cigarettes     Quit date: 2016     Years since quittin.8     Smokeless tobacco: Never Used   Vaping Use     Vaping Use: Never used   Substance Use Topics     Alcohol use: No     Drug use: Never        Medications:    albuterol (PROAIR HFA/PROVENTIL HFA/VENTOLIN HFA) 108 (90 Base) MCG/ACT inhaler  albuterol (PROVENTIL) (2.5 MG/3ML) 0.083% neb solution  amLODIPine (NORVASC) 5 MG tablet  aspirin EC 81 MG EC tablet  docusate sodium (COLACE) 100 MG capsule  Fluticasone-Umeclidin-Vilanterol (TRELEGY ELLIPTA) 100-62.5-25 MCG/INH oral inhaler  lisinopril-hydrochlorothiazide (ZESTORETIC) 20-12.5 MG tablet  loratadine (CLARITIN) 10 MG tablet  LORazepam (ATIVAN) 1 MG tablet  LORazepam (ATIVAN) 1 MG tablet  LORazepam (ATIVAN) 1 MG tablet  montelukast (SINGULAIR) 10 MG tablet  multivitamin, therapeutic (THERA-VIT) TABS tablet  nicotine (COMMIT) 2 MG lozenge  propranolol (INDERAL) 20 MG tablet  Respiratory Therapy  Supplies (INNOSPIRE REPLACEMENT FILTER[place] [facility] [place]  Respiratory Therapy Supplies (NEBULIZER/TUBING/MOUTHPIECE) KIT  tamsulosin (FLOMAX) 0.4 MG capsule          Review of Systems   Constitutional: Negative for fever.   HENT: Negative for drooling and facial swelling.    Eyes: Negative for pain and visual disturbance.   Respiratory: Negative for stridor.    Cardiovascular: Positive for chest pain.   Gastrointestinal: Positive for abdominal pain.        Lateral left upper abdominal pain.    Genitourinary: Negative for flank pain.   Musculoskeletal: Negative for back pain.   Skin: Negative for pallor.   Neurological: Negative for tremors, seizures and facial asymmetry.   Psychiatric/Behavioral: Negative for agitation and confusion.   All other systems reviewed and are negative.      Physical Exam   BP: 127/75  Pulse: (!) 49  Temp: 98.5  F (36.9  C)  Resp: 16  Weight: 79.8 kg (176 lb)  SpO2: 91 %      Physical Exam  Vitals and nursing note reviewed.   Constitutional:       General: He is not in acute distress.     Appearance: Normal appearance. He is not ill-appearing or toxic-appearing.   HENT:      Head: Normocephalic. No raccoon eyes, right periorbital erythema or left periorbital erythema.      Right Ear: No drainage or tenderness.      Left Ear: No drainage or tenderness.      Nose: Nose normal.   Eyes:      General: Lids are normal. Gaze aligned appropriately. No scleral icterus.     Extraocular Movements: Extraocular movements intact.   Neck:      Trachea: No tracheal deviation.   Cardiovascular:      Comments: Initial heart rate in triage was noted to be 49 via triage RN  Pulmonary:      Effort: Pulmonary effort is normal. No respiratory distress.      Breath sounds: No stridor.      Comments: Lung sounds are decreased throughout but more pronounced on the left.  His SaO2 was 91% on arrival and he was placed on 4 L nasal cannula and his O2 sats are now 95%.  Patient with significant pleuritic pain tenderness to  palpation over left lateral lower chest wall with some bruising noted.  No obvious crepitus.  No SQ E.  Abdominal:      General: Bowel sounds are normal. There is no distension.      Palpations: There is no mass.      Tenderness: There is abdominal tenderness. There is no right CVA tenderness, left CVA tenderness, guarding or rebound.      Comments: Left lateral upper abdominal wall area with some minimal bruising extending into the lateral left chest wall area.  No rebound or masses bowel sounds are normal   Musculoskeletal:         General: No deformity or signs of injury. Normal range of motion.      Cervical back: Normal range of motion. No signs of trauma.   Skin:     General: Skin is warm and dry.      Coloration: Skin is not jaundiced or pale.   Neurological:      General: No focal deficit present.      Mental Status: He is alert and oriented to person, place, and time.      GCS: GCS eye subscore is 4. GCS verbal subscore is 5. GCS motor subscore is 6.      Motor: No tremor or seizure activity.   Psychiatric:         Attention and Perception: Attention normal.         Mood and Affect: Mood normal.         ED Course     EKG shows a sinus rhythm with occasional PVCs.  Left axis deviation.  Possible inferior infarct, age undetermined heart rate is 85.  This tracing is very wavy and difficult to assess but essentially appears unchanged from previous EKG on 9/12/2021    No results found for this or any previous visit (from the past 24 hour(s)).    Medications   sodium chloride 0.9% infusion (has no administration in time range)       Assessments & Plan (with Medical Decision Making)     I have reviewed the nursing notes.    I have reviewed the findings, diagnosis, plan and need for follow up with the patient.       ED to Inpatient Handoff:    Discussed with Dr. Fajardo at [facility]  Patient accepted for Inpatient Stay  Pending studies include blood cultures  Code Status: Not Addressed         Follows: Here  New  Prescriptions    No medications on file       Final diagnoses:   Syncope, unspecified syncope type   Bradycardia - episode   Closed fracture of multiple ribs of left side, initial encounter   Pneumonia of left lower lobe due to infectious organism   Chest wall contusion, left, initial encounter   Abdominal wall pain in left upper quadrant   Left-sided chest wall pain     Afebrile.  Vital signs show some initial hypoxia with sats in the 91% on room air as well as bradycardia with initial heart rate at 49.  Patient here with an apparent syncopal episode 2 days ago in which she fell hitting his left chest wall and left upper abdomen on a wooden stool.  Patient had brief loss of consciousness and is here now with left chest wall pain and pleuritic pain with deep inspiration.  Physical examination shows some left lower chest wall bruising and tenderness as well as left lateral abdominal bruising and tenderness.  Differential diagnosis for syncope includes but is not noted to: Arrhythmias, aortic stenosis, MI, hypertrophic obstructive cardiomyopathy, pulmonary hypertension, PE, hypovolemia, medication reaction, autonomic insufficiency, spinal cord lesion and Guillain-Barré syndrome.  EKG shows a sinus rhythm with occasional PVCs.  Left axis deviation.  Possible inferior infarct, age undetermined heart rate is 85.  This tracing is very wavy and difficult to assess but essentially appears unchanged from previous EKG on 9/12/2021.  Patient was placed on cardiac telemetry.  IV was established and he was given fluids and fentanyl for pain relief.  Troponin is normal.  BNP is normal.  His CRP is slightly elevated at 14.1.  Influenza, RSV and COVID tests are negative.  CBC shows normal white blood cells no left shift.  Blood cultures are pending.  Initial lactic acid is elevated 2.1.  Fluids continued. Chest x-ray shows   Advanced emphysema. Question focal consolidation at the left lung base.  Left lower lobe pneumonia.  He was  started on Rocephin IV.  His procalcitonin however returns normal.  CT of his chest abdomen and pelvis with contrast shows fractures of his left sixth, seventh, eighth and ninth ribs.  I discussed these findings with the patient.  He reports feeling quite a bit of pain.  He is instructed incentive spirometry.  His procalcitonin returns normal.  I discussed this case with Dr. Fajardo and the patient will be admitted at this time for further medical management and evaluation.  He was noted this is not a partial trauma at her request I did inform  for this patient as well.    8/12/2022   [facility], Neo ESTRADA PA-C  08/12/22 [id number]

## 2022-08-12 NOTE — H&P
[facility]    History and Physical - Hospitalist Service       Date of Admission:  8/12/2022    Assessment & Plan      Brent Villagomez is a 77 year old male admitted on 8/12/2022. He presents with fall, rib fractures and pneumonia.    Fall  Syncope, likely related to hypoxia as he was not on his oxygen.  He has not been taking propranolol at home.  Follow-up.  Event was witnessed and sounds to be potentially syncopal in nature.  He was noted to be slightly bradycardic with a heart rate at 49 in the emergency room.  He is on propranolol as needed.  Electrolytes and hemoglobin within normal range.  Blood pressure and heart rate now normal.  -Admit inpatient  -Telemetry  -Orthostatic heart rate and blood pressure  - blood pressure medications.   -Monitor ins and outs and daily weights.  -Echocardiogram, will not be able to get until Monday.  If he is doing well troponins are negative and he has no further events could consider doing this outpatient.  -Carotid ultrasound  -EKG in AM    Left-sided rib fracture of ribs 6-9.  Left lower lobe pneumonia.  Acute hypoxic respiratory failure.  Currently needing 4 L nasal cannula oxygen.  History of COPD  -Rocephin and azithromycin  -Oxygen as needed to maintain sats 90 to 94%.  -Continue home inhalers and nebulizers  -Consider steroids as needed if any concern for COPD exacerbation he is a chronic heavy smoker.  -Pain control  -Pulmonary toilet         Diet: Combination Diet Regular Diet Adult  DVT Prophylaxis: Enoxaparin (Lovenox) SQ  Burris Catheter: Not present  Central Lines: None  Cardiac Monitoring: ACTIVE order. Indication: Syncope- low cardiac risk (24 hours)  Code Status: Full Code    Clinically Significant Risk Factors Present on Admission          # Hypercalcemia: Ca = 10.3 mg/dL (Ref range: 8.6 - 10.3 mg/dL) and/or iCa = N/A on admission, will monitor as appropriate        # Hypertension: home medication list includes antihypertensive(s)    #  "Overweight: Estimated body mass index is 26.85 kg/m  as calculated from the following:    Height as of this encounter: 1.727 m (5' 8\").    Weight as of this encounter: 80.1 kg (176 lb 9.6 oz).        Disposition Plan      1-2 days     The patient's care was discussed with the Patient.    Wilda Fajardo DO  Hospitalist Service  [facility]  Securely message with the Vocera Web Console (learn more here)  Text page via [employer] Paging/Directory         ______________________________________________________________________    Chief Complaint   Fall, rib fractures    History is obtained from the patient    History of Present Illness   Brent Villagomez is a 77 year old male who presents from home after fall.  The fall happened about 2 days ago.  He was at home.  It happened during the night.  He could not sleep so he got up.  He normally wears oxygen but he did not put it on to go to the kitchen.  As he was at the counter making a sandwich he passed out.  It was not witnessed.  He did not have any chest pain or shortness of breath prior to the event.  He does not recall tripping or falling.  Wife heard him and came and found him down.  He had some pain on the left side but did not come into the hospital.  However, the pain continued to get worse and he was having a hard time taking if she deep breath due to the discomfort.  He is normally on oxygen but was able to go without it at times during the day and has noticed his oxygen saturations have been dipping a little bit lower over the last 2 days since he fell.  He denies nausea or vomiting.  No fevers or chills.  No known sick contacts or recent travels.  No leg swelling.  He is not a diabetic and does not take insulin.  He has propranolol on his medication list but he never started taking that.  He otherwise takes his blood pressure his medication daily as directed.  At this time his pain is moderately well controlled after fentanyl in the emergency " room.  Pain is described as left-sided at the area of the fall and in the left shoulder.    Review of Systems    The 10 point Review of Systems is negative other than noted in the HPI or here.     Past Medical History    I have reviewed this patient's medical history and updated it with pertinent information if needed.   Past Medical History:   Diagnosis Date     Chronic hepatitis C (H)     No Comments Provided     Disorder of porphyrin metabolism (H)     No Comments Provided     Lumbar sprain     No Comments Provided     Other and unspecified alcohol dependence, unspecified drinking behavior     No Comments Provided     Other specified chronic obstructive airways disease     No Comments Provided     Personal history of other specified diseases     occupational, hammering     Polycythemia, secondary     No Comments Provided     Unspecified visual loss     near and far       Past Surgical History   I have reviewed this patient's surgical history and updated it with pertinent information if needed.  Past Surgical History:   Procedure Laterality Date     CLOSED REDUCTION ANKLE      Fx ankle with plate       Social History   I have reviewed this patient's social history and updated it with pertinent information if needed.  Social History     Tobacco Use     Smoking status: Former Smoker     Packs/day: 1.00     Types: Cigarettes     Quit date: 2016     Years since quittin.8     Smokeless tobacco: Never Used   Vaping Use     Vaping Use: Never used   Substance Use Topics     Alcohol use: No     Drug use: Never       Family History   I have reviewed this patient's family history and updated it with pertinent information if needed.  Family History   Problem Relation Age of Onset     Heart Disease Mother         Heart Disease,valve disease/rheumatic     Other - See Comments Father         sudden death age 70     Arthritis Father         Arthritis     Cancer Brother         Cancer,Skin cancer       Prior to Admission  Medications   Prior to Admission Medications   Prescriptions Last Dose Informant Patient Reported? Taking?   Fluticasone-Umeclidin-Vilanterol (TRELEGY ELLIPTA) 100-62.5-25 MCG/INH oral inhaler 8/12/2022 at AM Self Yes Yes   Sig: Inhale 1 puff into the lungs daily    LORazepam (ATIVAN) 1 MG tablet Past Week at AM  No Yes   Sig: Take 1 tablet (1 mg) by mouth 3 times daily as needed for anxiety   Respiratory Therapy Supplies (INNOSPIRE REPLACEMENT FILTER) MISC NA at NA Self Yes No   Respiratory Therapy Supplies (NEBULIZER/TUBING/MOUTHPIECE) KIT NA at NA Self Yes No   Sig: Use when taking nebs   albuterol (PROAIR HFA/PROVENTIL HFA/VENTOLIN HFA) 108 (90 Base) MCG/ACT inhaler Past Month at AM  No Yes   Sig: Inhale 2 puffs into the lungs every 6 hours as needed for shortness of breath / dyspnea   albuterol (PROVENTIL) (2.5 MG/3ML) 0.083% neb solution 8/12/2022 at AM  No Yes   Sig: TAKE 3 ML BY NEBULIZATION EVERY FOUR HOURS AS NEEDED FOR SHORTNESS OF BREATH.   amLODIPine (NORVASC) 5 MG tablet 8/12/2022 at AM Self Yes Yes   Sig: Take 2.5 mg by mouth daily    aspirin EC 81 MG EC tablet 8/12/2022 at AM Self Yes Yes   Sig: Take 81 mg by mouth daily    lisinopril-hydrochlorothiazide (ZESTORETIC) 20-12.5 MG tablet 8/12/2022 at AM Self Yes Yes   Sig: Take 1 tablet by mouth daily    loratadine (CLARITIN) 10 MG tablet 8/12/2022 at AM Self Yes Yes   Sig: Take 10 mg by mouth 2 times daily    montelukast (SINGULAIR) 10 MG tablet 8/11/2022 at PM Self Yes Yes   Sig: Take 10 mg by mouth At Bedtime    multivitamin, therapeutic (THERA-VIT) TABS tablet 8/12/2022 at AM Self Yes Yes   Sig: Take 1 tablet by mouth daily   nicotine (COMMIT) 2 MG lozenge 8/12/2022 at AM Self Yes Yes   Sig: Place 2 mg inside cheek every hour as needed for smoking cessation   tamsulosin (FLOMAX) 0.4 MG capsule 8/12/2022 at AM Self Yes Yes   Sig: Take 0.8 mg by mouth daily       Facility-Administered Medications: None     Allergies   Allergies   Allergen Reactions      No Clinical Screening - See Comments      Dust and pollen       Physical Exam   Vital Signs: Temp: 98.5  F (36.9  C)   BP: (!) 141/87 Pulse: 76   Resp: 23 SpO2: 97 % O2 Device: Nasal cannula Oxygen Delivery: 4 LPM  Weight: 176 lbs 9.6 oz    General Appearance: Awake, alert and oriented.  No acute distress.  Appears stated age.  Eyes: Extraocular muscle intact.  Nonicteric, noninjected  HEENT: Normocephalic, atraumatic.  Moist mucous membranes  Respiratory: Expiratory wheezing.  No rhonchi or rales.  Pain with deep inhalation.  Cardiovascular: Regular rate.  No murmur.  No lower extremity edema  GI: Abdomen is soft, nontender, nondistended  Skin: Warm and dry.  No obvious bruising or rashes.  Musculoskeletal: Moves arms and legs equally normally  Neurologic: No focal deficits  Psychiatric: Appropriate affect and insight    Data   Data reviewed today: I reviewed all medications, new labs and imaging results over the last 24 hours. I personally reviewed the EKG tracing showing Normal sinus rhythm.    Recent Labs   Lab 08/12/22  1443   WBC 8.6   HGB 17.0   MCV 97      INR 1.02      POTASSIUM 4.7   CHLORIDE 100   CO2 28   BUN 27*   CR 1.15   ANIONGAP 9   MARCY 10.3   *   ALBUMIN 4.3   PROTTOTAL 7.2   BILITOTAL 0.9   ALKPHOS 66   ALT 21   AST 22     Recent Results (from the past 24 hour(s))   XR Chest Port 1 View    Narrative    PROCEDURE:  XR CHEST PORT 1 VIEW    HISTORY: trauma. .    COMPARISON:  9/11/2021    FINDINGS:    The cardiomediastinal contours are stable. There is calcific aortic  atherosclerosis.   Advanced emphysematous changes are again seen. Some superimposed  consolidation is questioned at the left lung base. No pneumothorax or  gross bony trauma is identified.      Impression    IMPRESSION:  Advanced emphysema. Question focal consolidation at the  left lung base. Recommend follow-up.      NAVDEEP OLSON MD         SYSTEM ID:  [id number]   CT Chest/Abdomen/Pelvis w Contrast     Narrative    PROCEDURE: CT CHEST/ABDOMEN/PELVIS W CONTRAST 8/12/2022 3:40 PM    HISTORY: trauma 2 days ago, SOB.  left rib and abdominal injury    COMPARISONS: None.    Meds/Dose Given: Isovue 370, 92 mL    TECHNIQUE: CT scan of the abdomen and pelvis with IV contrast sagittal  and coronal reconstructions were obtained    FINDINGS: There are fractures of the left eighth and ninth ribs. No  pneumothorax or pleural effusion is seen. There are some atelectatic  changes seen at the left lung base.    There is a small low-density lesion peripherally in the right lobe of  the liver most likely a cyst or hemangioma. No calcified gallstones  are seen. No splenic injury is seen. The pancreas appears normal. The  adrenal glands are normal. There is a benign-appearing cyst seen in  the right kidney. No renal injury is seen. There is no  hydronephrosis.. The periaortic lymph nodes are normal in caliber. No  free air or free fluid is seen within the abdomen. The appendix is  normal. No intraperitoneal masses or inflammatory changes are noted.  The bladder and rectum are normal. The prostate is enlarged.    There is spondylolisthesis of L3 on L4 and L4 on L5 severe lower  lumbar facet joint degenerative changes are noted. No pelvic fracture  is noted. Both proximal femurs are intact.         Impression    IMPRESSION: Left lower rib fractures. Only the lower ribs were studied  on this CT scan of the abdomen and pelvis. No pneumothorax or pleural  effusion is    Rib Trauma: 1 or 2 acute rib fractures     PANCHO DUARTE MD         SYSTEM ID:  [id number]   CT Chest w/o Contrast    Narrative    PROCEDURE: CT CHEST W/O CONTRAST 8/12/2022 4:16 PM    HISTORY: recent trauma, rib concern    COMPARISONS: None.    Meds/Dose Given:    TECHNIQUE: CT scan of the chest without IV contrast sagittal coronal  reconstructions were obtained    FINDINGS: There are fractures of the sixth, seventh, eighth and ninth  ribs. No associated pneumothorax is  seen. There is some minimal  pleural thickening or effusion seen in the left hemithorax. No  thoracic fracture is seen. Sternum is intact.    Severe emphysematous changes are noted in the lungs. There is some  linear scarring seen in both lungs. In the right lower lobe there is a  subpleural nodule measuring 9 mm. This nodule is unchanged from a  previous CT of the chest at 2019.    The hilar and mediastinal lymph nodes are normal in caliber. Axillary  and supraclavicular lymph nodes are normal. The heart is normal in  size         Impression    IMPRESSION: Fractures of the left sixth, seventh, eighth and ninth  ribs. There is no left-sided pneumothorax. A small pleural effusion is  seen    Rib Trauma: 3+ acute rib fractures     PANCHO DUARTE MD         SYSTEM ID:  [id number]   US Carotid Bilateral    Narrative    PROCEDURE: US CAROTID BILATERAL 8/12/2022 5:59 PM    HISTORY: syncope    COMPARISONS: None.    TECHNIQUE: Duplex ultrasound of the carotids    FINDINGS: There is atherosclerotic plaquing of both carotid  bifurcations. No hemodynamically significant lesions are identified in  the distal common or proximal internal carotid arteries. There is a  hemodynamically significant stenosis noted in the right external  carotid artery.    Forward flow was demonstrated in both vertebral arteries.         Impression    IMPRESSION: No hemodynamically significant stenoses are identified in  the distal common or proximal internal carotid arteries    PANCHO DUARTE MD         SYSTEM ID:  [id number]

## 2022-08-12 NOTE — PHARMACY-ADMISSION MEDICATION HISTORY
Pharmacy -- Admission Medication Reconciliation    Prior to admission (PTA) medications were reviewed and the patient's PTA medication list was updated.    Sources Consulted: patient interview, sure scripts, chart review    The reliability of this Medication Reconciliation is: Reliability: Reliable    The following significant changes were made:    Removed docusate    Removed propranolol--patient states he never used this and has disposed of it    Removed duplicate lorazepam rx's    In addition, the patient's allergies were reviewed with the patient and updated as follows:   Allergies: No clinical screening - see comments    The pharmacist has reviewed with the patient that all personal medications should be removed from the building or locked in the belongings safe.  Patient shall only take medications ordered by the physician and administered by the nursing staff.       Medication barriers identified: none   Medication adherence concerns: none   Understanding of emergency medications: has nebs/MDI on hand    Kemi Torres RPH, 8/12/2022,  5:12 PM

## 2022-08-12 NOTE — PROGRESS NOTES
Admission Note    Data:  Brent Villagomez admitted to 350 from emergency room at 1720.      Action:  Dr. Perrin has been notified of admission. Pt oriented to unit, call light in reach.     Response:  Patient tolerated transfer well.

## 2022-08-12 NOTE — ED TRIAGE NOTES
Pt arrives with history of a fall two nights ago, denies hitting head, denies blood thinners. Pt states that he passed out and then hit a chair with his ribs, has had increased SOB since then. Pt is normally on 2-4L O2/NC at baseline.     Triage Assessment     Row Name 08/12/22 1417       Triage Assessment (Adult)    Airway WDL WDL       Respiratory WDL    Respiratory WDL X;rhythm/pattern    Rhythm/Pattern, Respiratory shortness of breath

## 2022-08-13 LAB
ALBUMIN UR-MCNC: NEGATIVE MG/DL
ANION GAP SERPL CALCULATED.3IONS-SCNC: 7 MMOL/L (ref 3–14)
APPEARANCE UR: CLEAR
BILIRUB UR QL STRIP: NEGATIVE
BUN SERPL-MCNC: 22 MG/DL (ref 7–25)
CALCIUM SERPL-MCNC: 8.7 MG/DL (ref 8.6–10.3)
CHLORIDE BLD-SCNC: 106 MMOL/L (ref 98–107)
CO2 SERPL-SCNC: 27 MMOL/L (ref 21–31)
COLOR UR AUTO: ABNORMAL
CREAT SERPL-MCNC: 0.89 MG/DL (ref 0.7–1.3)
ERYTHROCYTE [DISTWIDTH] IN BLOOD BY AUTOMATED COUNT: 12.2 % (ref 10–15)
GFR SERPL CREATININE-BSD FRML MDRD: 89 ML/MIN/1.73M2
GLUCOSE BLD-MCNC: 81 MG/DL (ref 70–105)
GLUCOSE BLDC GLUCOMTR-MCNC: 80 MG/DL (ref 70–99)
GLUCOSE BLDC GLUCOMTR-MCNC: 94 MG/DL (ref 70–99)
GLUCOSE UR STRIP-MCNC: NEGATIVE MG/DL
HCT VFR BLD AUTO: 42.3 % (ref 40–53)
HGB BLD-MCNC: 14.7 G/DL (ref 13.3–17.7)
HGB UR QL STRIP: NEGATIVE
KETONES UR STRIP-MCNC: ABNORMAL MG/DL
LEUKOCYTE ESTERASE UR QL STRIP: NEGATIVE
MAGNESIUM SERPL-MCNC: 1.8 MG/DL (ref 1.9–2.7)
MCH RBC QN AUTO: 34.2 PG (ref 26.5–33)
MCHC RBC AUTO-ENTMCNC: 34.8 G/DL (ref 31.5–36.5)
MCV RBC AUTO: 98 FL (ref 78–100)
NITRATE UR QL: NEGATIVE
PH UR STRIP: 5 [PH] (ref 5–9)
PLATELET # BLD AUTO: 175 10E3/UL (ref 150–450)
POTASSIUM BLD-SCNC: 4.4 MMOL/L (ref 3.5–5.1)
RBC # BLD AUTO: 4.3 10E6/UL (ref 4.4–5.9)
RBC URINE: <1 /HPF
SODIUM SERPL-SCNC: 140 MMOL/L (ref 134–144)
SP GR UR STRIP: 1.05 (ref 1–1.03)
UROBILINOGEN UR STRIP-MCNC: NORMAL MG/DL
WBC # BLD AUTO: 6.9 10E3/UL (ref 4–11)
WBC URINE: 1 /HPF

## 2022-08-13 PROCEDURE — 120N000001 HC R&B MED SURG/OB

## 2022-08-13 PROCEDURE — 99233 SBSQ HOSP IP/OBS HIGH 50: CPT | Performed by: FAMILY MEDICINE

## 2022-08-13 PROCEDURE — 250N000013 HC RX MED GY IP 250 OP 250 PS 637: Performed by: FAMILY MEDICINE

## 2022-08-13 PROCEDURE — 83735 ASSAY OF MAGNESIUM: CPT | Performed by: FAMILY MEDICINE

## 2022-08-13 PROCEDURE — 999N000157 HC STATISTIC RCP TIME EA 10 MIN

## 2022-08-13 PROCEDURE — 85027 COMPLETE CBC AUTOMATED: CPT | Performed by: FAMILY MEDICINE

## 2022-08-13 PROCEDURE — 87205 SMEAR GRAM STAIN: CPT | Performed by: FAMILY MEDICINE

## 2022-08-13 PROCEDURE — 250N000013 HC RX MED GY IP 250 OP 250 PS 637: Performed by: SURGERY

## 2022-08-13 PROCEDURE — 250N000011 HC RX IP 250 OP 636: Performed by: SURGERY

## 2022-08-13 PROCEDURE — 250N000011 HC RX IP 250 OP 636: Performed by: FAMILY MEDICINE

## 2022-08-13 PROCEDURE — 258N000003 HC RX IP 258 OP 636: Performed by: STUDENT IN AN ORGANIZED HEALTH CARE EDUCATION/TRAINING PROGRAM

## 2022-08-13 PROCEDURE — 93010 ELECTROCARDIOGRAM REPORT: CPT | Performed by: INTERNAL MEDICINE

## 2022-08-13 PROCEDURE — 94640 AIRWAY INHALATION TREATMENT: CPT

## 2022-08-13 PROCEDURE — 82310 ASSAY OF CALCIUM: CPT | Performed by: FAMILY MEDICINE

## 2022-08-13 PROCEDURE — 93005 ELECTROCARDIOGRAM TRACING: CPT

## 2022-08-13 PROCEDURE — 94640 AIRWAY INHALATION TREATMENT: CPT | Mod: 76

## 2022-08-13 PROCEDURE — 250N000012 HC RX MED GY IP 250 OP 636 PS 637: Performed by: FAMILY MEDICINE

## 2022-08-13 PROCEDURE — 258N000003 HC RX IP 258 OP 636: Performed by: FAMILY MEDICINE

## 2022-08-13 PROCEDURE — 250N000009 HC RX 250: Performed by: FAMILY MEDICINE

## 2022-08-13 PROCEDURE — 81001 URINALYSIS AUTO W/SCOPE: CPT | Performed by: FAMILY MEDICINE

## 2022-08-13 PROCEDURE — 99222 1ST HOSP IP/OBS MODERATE 55: CPT | Mod: 25 | Performed by: SURGERY

## 2022-08-13 PROCEDURE — 250N000011 HC RX IP 250 OP 636: Performed by: STUDENT IN AN ORGANIZED HEALTH CARE EDUCATION/TRAINING PROGRAM

## 2022-08-13 PROCEDURE — 36415 COLL VENOUS BLD VENIPUNCTURE: CPT | Performed by: FAMILY MEDICINE

## 2022-08-13 RX ORDER — ACETAMINOPHEN 325 MG/1
975 TABLET ORAL EVERY 6 HOURS
Status: DISCONTINUED | OUTPATIENT
Start: 2022-08-13 | End: 2022-08-17 | Stop reason: HOSPADM

## 2022-08-13 RX ORDER — PREDNISONE 20 MG/1
40 TABLET ORAL DAILY
Status: DISCONTINUED | OUTPATIENT
Start: 2022-08-13 | End: 2022-08-17 | Stop reason: HOSPADM

## 2022-08-13 RX ORDER — KETOROLAC TROMETHAMINE 15 MG/ML
15 INJECTION, SOLUTION INTRAMUSCULAR; INTRAVENOUS EVERY 6 HOURS PRN
Status: DISCONTINUED | OUTPATIENT
Start: 2022-08-13 | End: 2022-08-14

## 2022-08-13 RX ORDER — POLYETHYLENE GLYCOL 3350 17 G/17G
17 POWDER, FOR SOLUTION ORAL DAILY
Status: DISCONTINUED | OUTPATIENT
Start: 2022-08-13 | End: 2022-08-17 | Stop reason: HOSPADM

## 2022-08-13 RX ADMIN — ACETAMINOPHEN 975 MG: 325 TABLET, FILM COATED ORAL at 16:53

## 2022-08-13 RX ADMIN — HYDROMORPHONE HYDROCHLORIDE 2 MG: 2 TABLET ORAL at 01:10

## 2022-08-13 RX ADMIN — HYDROMORPHONE HYDROCHLORIDE 2 MG: 2 TABLET ORAL at 10:20

## 2022-08-13 RX ADMIN — MAGNESIUM SULFATE HEPTAHYDRATE 3 G: 500 INJECTION, SOLUTION INTRAMUSCULAR; INTRAVENOUS at 17:33

## 2022-08-13 RX ADMIN — FLUTICASONE FUROATE AND VILANTEROL TRIFENATATE 1 PUFF: 100; 25 POWDER RESPIRATORY (INHALATION) at 06:31

## 2022-08-13 RX ADMIN — HYDROMORPHONE HYDROCHLORIDE 2 MG: 2 TABLET ORAL at 20:44

## 2022-08-13 RX ADMIN — ALBUTEROL SULFATE 2.5 MG: 2.5 SOLUTION RESPIRATORY (INHALATION) at 10:40

## 2022-08-13 RX ADMIN — ACETAMINOPHEN 975 MG: 325 TABLET, FILM COATED ORAL at 22:52

## 2022-08-13 RX ADMIN — KETOROLAC TROMETHAMINE 15 MG: 15 INJECTION, SOLUTION INTRAMUSCULAR; INTRAVENOUS at 16:54

## 2022-08-13 RX ADMIN — STANDARDIZED SENNA CONCENTRATE 8.6 MG: 8.6 TABLET ORAL at 20:44

## 2022-08-13 RX ADMIN — LISINOPRIL 20 MG: 20 TABLET ORAL at 10:20

## 2022-08-13 RX ADMIN — KETOROLAC TROMETHAMINE 15 MG: 15 INJECTION, SOLUTION INTRAMUSCULAR; INTRAVENOUS at 10:21

## 2022-08-13 RX ADMIN — MONTELUKAST SODIUM 10 MG: 5 TABLET, CHEWABLE ORAL at 22:52

## 2022-08-13 RX ADMIN — ALBUTEROL SULFATE 2.5 MG: 2.5 SOLUTION RESPIRATORY (INHALATION) at 14:40

## 2022-08-13 RX ADMIN — ONDANSETRON 4 MG: 4 TABLET, ORALLY DISINTEGRATING ORAL at 16:31

## 2022-08-13 RX ADMIN — AZITHROMYCIN MONOHYDRATE 250 MG: 500 INJECTION, POWDER, LYOPHILIZED, FOR SOLUTION INTRAVENOUS at 10:22

## 2022-08-13 RX ADMIN — UMECLIDINIUM 1 PUFF: 62.5 AEROSOL, POWDER ORAL at 06:31

## 2022-08-13 RX ADMIN — PREDNISONE 40 MG: 20 TABLET ORAL at 14:05

## 2022-08-13 RX ADMIN — HYDROMORPHONE HYDROCHLORIDE 2 MG: 2 TABLET ORAL at 14:05

## 2022-08-13 RX ADMIN — CEFTRIAXONE SODIUM 2 G: 2 INJECTION, SOLUTION INTRAVENOUS at 14:06

## 2022-08-13 RX ADMIN — POLYETHYLENE GLYCOL 3350 17 G: 17 POWDER, FOR SOLUTION ORAL at 14:17

## 2022-08-13 RX ADMIN — ENOXAPARIN SODIUM 40 MG: 40 INJECTION SUBCUTANEOUS at 23:03

## 2022-08-13 RX ADMIN — ALBUTEROL SULFATE 2.5 MG: 2.5 SOLUTION RESPIRATORY (INHALATION) at 06:31

## 2022-08-13 RX ADMIN — ALBUTEROL SULFATE 2.5 MG: 2.5 SOLUTION RESPIRATORY (INHALATION) at 23:34

## 2022-08-13 RX ADMIN — AMLODIPINE BESYLATE 2.5 MG: 2.5 TABLET ORAL at 10:21

## 2022-08-13 RX ADMIN — HYDROMORPHONE HYDROCHLORIDE 2 MG: 2 TABLET ORAL at 05:30

## 2022-08-13 RX ADMIN — ACETAMINOPHEN 975 MG: 325 TABLET, FILM COATED ORAL at 10:20

## 2022-08-13 RX ADMIN — ASPIRIN 81 MG: 81 TABLET, COATED ORAL at 10:21

## 2022-08-13 RX ADMIN — KETOROLAC TROMETHAMINE 15 MG: 15 INJECTION, SOLUTION INTRAMUSCULAR; INTRAVENOUS at 23:00

## 2022-08-13 ASSESSMENT — ACTIVITIES OF DAILY LIVING (ADL)
ADLS_ACUITY_SCORE: 22
ADLS_ACUITY_SCORE: 22
ADLS_ACUITY_SCORE: 23
ADLS_ACUITY_SCORE: 22
ADLS_ACUITY_SCORE: 23
ADLS_ACUITY_SCORE: 22
ADLS_ACUITY_SCORE: 23
ADLS_ACUITY_SCORE: 22

## 2022-08-13 NOTE — PLAN OF CARE
"Patient admitted from ER with 4 fractured ribs and pneumonia.  Pain rated 7/10 after admission, was given dilaudid IV 0.5 mg, tylenol 650 mg and robitussin.  Will need to request stool softener from provider when able as patient reported some difficulty with BM due to pain.  Reports he is continent of bowel and bladder.  Encouraged him to call staff when he needs to get up.  Ate half a sandwich for dinner.      BP (!) 161/86 (BP Location: Right arm, Patient Position: Semi-Galeano's, Cuff Size: Adult Regular)   Pulse 75   Temp 97.2  F (36.2  C) (Tympanic)   Resp 16   Ht 1.727 m (5' 8\")   Wt 80.1 kg (176 lb 9.6 oz)   SpO2 93%   BMI 26.85 kg/m        Goal Outcome Evaluation:    Plan of Care Reviewed With: patient     Overall Patient Progress: no change      Problem: Plan of Care - These are the overarching goals to be used throughout the patient stay.    Goal: Plan of Care Review/Shift Note  Description: The Plan of Care Review/Shift note should be completed every shift.  The Outcome Evaluation is a brief statement about your assessment that the patient is improving, declining, or no change.  This information will be displayed automatically on your shift note.  Outcome: Ongoing, Progressing  Flowsheets (Taken 8/12/2022 1906)  Plan of Care Reviewed With: patient  Overall Patient Progress: no change          "

## 2022-08-13 NOTE — PROGRESS NOTES
Blood pressure drops with standing and HR rises.  Left chest pain of 5 when lying still.  Patient feels he needs better pain control.  Nonproductive cough is very painful. Sitting up is painful.          SAFETY CHECKLIST  ID Bands and Risk clasps correct and in place (DNR, Fall risk, Allergy, Latex, Limb):  Yes  All Lines Reconciled and labeled correctly: Yes  Whiteboard updated:Yes  Environmental interventions (bed/chair alarm on, call light, side rails, restraints, sitter....): Yes  Verify Tele #:

## 2022-08-13 NOTE — PROGRESS NOTES
Incentive Spirometry education completed.  Pt goal 2500 mls.  Pt achieved 1500 mls.  Pt instructed to perform 10/hr while awake with at least one deep breath and cough per hour until able to perform baseline activity.  RT will follow and re-assess as need.      RT Kendal on 8/13/2022 at 6:51 AM

## 2022-08-13 NOTE — CONSULTS
GENERAL SURGERY CONSULTATION NOTE    Brent Villagomez   [address] [facility]  [place] [address]  77 year old  male    Primary Care Provider:  Prince Proctor      HPI: Brent Villagomez presents to the emergency department for rib pain after fall and difficulty breathing.  Patient states that 2 days ago he got up and went into the kitchen and passed out.  He hit his chest on a chair on his way down but did not hit his head.  Patient states he was alert immediately after this denies any other pain aside from pain in his left chest.  Denies fevers or chills.  Patient does wear oxygen for history of COPD from smoking.     REVIEW OF SYSTEMS:    GENERAL: No fevers or chills. Denies fatigue, recent weight loss.  HEENT: No sinus drainage. No changes with vision or hearing. No difficulty swallowing.   LYMPHATICS:  Noswollen nodes in axilla, neck or groin.  CARDIOVASCULAR: Denies chest pain, palpitations and dyspnea on exertion.  PULMONARY: No shortness of breath or cough. No increase in sputum production.  GI: Denies melena,bright red blood in stools. No hematemesis. No constipation or diarrhea.  : No dysuria or hematuria.  SKIN: No recent rashes or ulcers.   HEMATOLOGY:  No history of easy bruising or bleeding.  ENDOCRINE:  No history of diabetes or thyroid problems.  NEUROLOGY:  No history of seizures or headaches. No motor or sensory changes.        Patient Active Problem List   Diagnosis     Actinic keratosis     Obstructive chronic bronchitis without exacerbation (H)     Benign neoplasm of colon     Degeneration of lumbar or lumbosacral intervertebral disc     Acute hepatitis C virus infection     Hypertension     Obesity     Lichenification     Osteoarthritis, hand     Polycythemia, secondary     Disorder of porphyrin metabolism (H)     Tobacco user     Tachypnea     COPD exacerbation (H)     Community acquired pneumonia     Acute on chronic respiratory failure with hypoxia (H)     Alcohol dependence in remission  (H)     Benign prostatic hyperplasia with lower urinary tract symptoms, symptom details unspecified     Colon polyps     Family history of abdominal aortic aneurysm     Health care directive on file     Major depressive disorder, recurrent episode, moderate (H)     Multiple pulmonary nodules     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, treated     COPD with emphysema (H)     Stage 3 severe COPD by GOLD classification (H)     Pain medication agreement-nonopioid 9-23-21     Other long term (current) drug therapy     Closed fracture of multiple ribs     Syncope     Bradycardia     Abdominal wall pain in left upper quadrant     Chest wall contusion, left, initial encounter     Pneumonia of left lower lobe due to infectious organism       Past Medical History:   Diagnosis Date     Chronic hepatitis C (H)     No Comments Provided     Disorder of porphyrin metabolism (H)     No Comments Provided     Lumbar sprain     No Comments Provided     Other and unspecified alcohol dependence, unspecified drinking behavior     No Comments Provided     Other specified chronic obstructive airways disease     No Comments Provided     Personal history of other specified diseases     occupational, hammering     Polycythemia, secondary     No Comments Provided     Unspecified visual loss     near and far       Past Surgical History:   Procedure Laterality Date     CLOSED REDUCTION ANKLE      Fx ankle with plate       Family History   Problem Relation Age of Onset     Heart Disease Mother         Heart Disease,valve disease/rheumatic     Other - See Comments Father         sudden death age 70     Arthritis Father         Arthritis     Cancer Brother         Cancer,Skin cancer       Social History     Social History Narrative    Living on [facility] [place] of [place].  Lives by self, seperated, 2 children grown.  Disabled from COPD, Hepatitis C.    p 7/19/2013.       Social History     Socioeconomic History     Marital  status:      Spouse name: Not on file     Number of children: Not on file     Years of education: Not on file     Highest education level: Not on file   Occupational History     Not on file   Tobacco Use     Smoking status: Former Smoker     Packs/day: 1.00     Types: Cigarettes     Quit date: 2016     Years since quittin.8     Smokeless tobacco: Never Used   Vaping Use     Vaping Use: Never used   Substance and Sexual Activity     Alcohol use: No     Drug use: Never     Sexual activity: Not Currently   Other Topics Concern     Not on file   Social History Narrative    Living on [facility].  Lives by self, seperated, 2 children grown.  Disabled from COPD, Hepatitis C.    p 2013.     Social Determinants of Health     Financial Resource Strain: Not on file   Food Insecurity: Not on file   Transportation Needs: Not on file   Physical Activity: Not on file   Stress: Not on file   Social Connections: Not on file   Intimate Partner Violence: Not on file   Housing Stability: Not on file       No current facility-administered medications on file prior to encounter.  albuterol (PROAIR HFA/PROVENTIL HFA/VENTOLIN HFA) 108 (90 Base) MCG/ACT inhaler, Inhale 2 puffs into the lungs every 6 hours as needed for shortness of breath / dyspnea  albuterol (PROVENTIL) (2.5 MG/3ML) 0.083% neb solution, TAKE 3 ML BY NEBULIZATION EVERY FOUR HOURS AS NEEDED FOR SHORTNESS OF BREATH.  amLODIPine (NORVASC) 5 MG tablet, Take 2.5 mg by mouth daily   aspirin EC 81 MG EC tablet, Take 81 mg by mouth daily   Fluticasone-Umeclidin-Vilanterol (TRELEGY ELLIPTA) 100-62.5-25 MCG/INH oral inhaler, Inhale 1 puff into the lungs daily   lisinopril-hydrochlorothiazide (ZESTORETIC) 20-12.5 MG tablet, Take 1 tablet by mouth daily   loratadine (CLARITIN) 10 MG tablet, Take 10 mg by mouth 2 times daily   LORazepam (ATIVAN) 1 MG tablet, Take 1 tablet (1 mg) by mouth 3 times daily as needed for anxiety  montelukast  "(SINGULAIR) 10 MG tablet, Take 10 mg by mouth At Bedtime   multivitamin, therapeutic (THERA-VIT) TABS tablet, Take 1 tablet by mouth daily  nicotine (COMMIT) 2 MG lozenge, Place 2 mg inside cheek every hour as needed for smoking cessation  tamsulosin (FLOMAX) 0.4 MG capsule, Take 0.8 mg by mouth daily   Respiratory Therapy Supplies (INNOSPIRE REPLACEMENT FILTER) MISC,   Respiratory Therapy Supplies (NEBULIZER/TUBING/MOUTHPIECE) KIT, Use when taking nebs          ALLERGIES/SENSITIVITIES:   Allergies   Allergen Reactions     No Clinical Screening - See Comments      Dust and pollen       PHYSICAL EXAM:     BP (!) 161/86 (BP Location: Right arm, Patient Position: Semi-Galeano's, Cuff Size: Adult Regular)   Pulse 59   Temp 97.5  F (36.4  C) (Tympanic)   Resp 16   Ht 1.727 m (5' 8\")   Wt 79.4 kg (175 lb)   SpO2 (!) 88%   BMI 26.61 kg/m      General Appearance:   Sitting up in the bed, no apparent distress  HEENT: Pupils are equal and reactive, no scleral icterus, no signs of trauma about the head and neck  Heart & CV:  RRR no murmur.  Intact distal pulses, good cap refill.  LUNGS: Slight increased work of breathing, good air entry bilaterally in upper and lower lungs with bilateral, moderate wheeze.  No rhonchi.  Abd: Soft, nontender, nondistended  Ext: Normal bulk and tone, no lower extremity edema, no signs of trauma extremities  Neuro: Alert and oriented, normal speech mentation    Labs are reviewed and are significant for sodium 140, potassium 4.4, creatinine 0.89, magnesium 1.8, WBC 6.9, hemoglobin 40.7, platelet 175    CT chest abdomen pelvis: No signs of abdominal trauma.  Fractures of the left sixth, seventh, eighth and ninth  ribs. There is no left-sided pneumothorax. A small pleural effusion is  seen          CONSULTATION ASSESSMENT AND PLAN:    77 year old male with multiple, nondisplaced left-sided rib fractures from same level fall.  Patient is also noted to have pneumonia.  Recommend continue to treat " pneumonia as well as possible orthostasis, upon review of recorded blood pressures.  Recommend adequate pain control and aggressive pulmonary toilet for rib fractures.  DVT prophylaxis with Lovenox and SCDs.  Consider PT and OT.     Randolph Mcgovern MD on 8/13/2022 at 9:16 AM

## 2022-08-13 NOTE — PROGRESS NOTES
Patient remained stable throughout the night no changes made . Patient given Scheduled Treatments . Patient remained on 4L NC .     RT Kendal on 8/13/2022 at 6:35 AM

## 2022-08-13 NOTE — PROGRESS NOTES
[facility]    Medicine Progress Note - Hospitalist Service    Date of Admission:  8/12/2022    Assessment & Plan               Brent Villagomez is a 77 year old male admitted on 8/12/2022. He presents with fall, rib fractures and pneumonia.    Fall  Syncope, likely related to hypoxia as he was not on his oxygen.  He has not been taking propranolol at home.  However, cannot exclude cardiac arrhythmia as contributing to symptoms.  He has been monitored on telemetry thus far without any significant events.   Event was witnessed and sounds to be potentially syncopal in nature.  He was noted to be slightly bradycardic with a heart rate at 49 in the emergency room.   Electrolytes and hemoglobin within normal range.  Blood pressure and heart rate now normal.  -Telemetry, consider Zio patch at discharge.  -Orthostatic heart rate and blood pressure, was somewhat orthostatic.  - blood pressure medications being monitored and changed as needed for symptom management specifically orthostasis.  -Monitor ins and outs and daily weights.  -Echocardiogram, will not be able to get until Monday.  If he is doing well troponins are negative and he has no further events could consider doing this outpatient.  -Carotid ultrasound did not show any hemodynamically significant stenosis.  -EKG in AM    Left-sided rib fracture of ribs 6-9.  Left lower lobe pneumonia.  Acute hypoxic respiratory failure.  Currently needing 4 L nasal cannula oxygen.  History of COPD, with wheezing on exam today  -Rocephin and azithromycin  -Adding steroids today.  -Oxygen as needed to maintain sats 90 to 94%.  -Continue home inhalers and nebulizers  -Pain control  -Pulmonary toilet    History of COPD.  Likely acute COPD exacerbation.  Quit smoking about 5 years ago.  -Nebulizers and inhalers.  -Steroids           Diet: Combination Diet Regular Diet Adult    DVT Prophylaxis: Enoxaparin (Lovenox) SQ  Burris Catheter: Not present  Central Lines:  "None  Cardiac Monitoring: ACTIVE order. Indication: Syncope- low cardiac risk (24 hours)  Code Status: Full Code      Disposition Plan      Probably will need 2-3 more days in the hospital to help with pneumonia, pain control and COPD.      The patient's care was discussed with the Patient.    Wilda Fajardo DO  Hospitalist Service  [facility]  Securely message with the Vocera Web Console (learn more here)  Text page via [employer] Paging/Directory         Clinically Significant Risk Factors Present on Admission                 # Hypertension: home medication list includes antihypertensive(s)    # Overweight: Estimated body mass index is 26.61 kg/m  as calculated from the following:    Height as of this encounter: 1.727 m (5' 8\").    Weight as of this encounter: 79.4 kg (175 lb).        ______________________________________________________________________    Interval History   Had increasing oxygen requirement overnight.  Still has pain of the left side worse with coughing sneezing for abdominal strain.  Has not had a bowel movement about 3 days.  Feeling slightly more short of breath this morning.  No chest pain.  No increasing leg swelling.  Did not sleep well.    Data reviewed today: I reviewed all medications, new labs and imaging results over the last 24 hours. I personally reviewed no images or EKG's today.    Physical Exam   Vital Signs: Temp: 97.5  F (36.4  C) Temp src: Tympanic BP: (!) 161/86 Pulse: 59   Resp: 16 SpO2: (!) 88 % O2 Device: Nasal cannula Oxygen Delivery: 4 LPM  Weight: 175 lbs 0 oz    General Appearance: Awake, alert and oriented.  No acute distress.  Appears stated age.  Eyes: Extraocular muscle intact.  Nonicteric, noninjected  HEENT: Normocephalic, atraumatic.  Moist mucous membranes  Respiratory: Expiratory wheezing, worse on the left compared to the right..  No rhonchi or rales.  Pain with deep inhalation.  No bruising of the rib cage area.  Cardiovascular: Regular " rate.  No murmur.  No lower extremity edema  GI: Abdomen is soft, nontender, nondistended  Skin: Warm and dry.  No obvious bruising or rashes.  Musculoskeletal: Moves arms and legs equally normally  Neurologic: No focal deficits  Psychiatric: Appropriate affect and insight    Data   Recent Labs   Lab 08/13/22  1134 08/13/22  0551 08/12/22  2244 08/12/22  1443   WBC  --  6.9  --  8.6   HGB  --  14.7  --  17.0   MCV  --  98  --  97   PLT  --  175  --  179   INR  --   --   --  1.02   NA  --  140  --  137   POTASSIUM  --  4.4  --  4.7   CHLORIDE  --  106  --  100   CO2  --  27  --  28   BUN  --  22  --  27*   CR  --  0.89  --  1.15   ANIONGAP  --  7  --  9   MARCY  --  8.7  --  10.3   GLC 80 81 110* 108*   ALBUMIN  --   --   --  4.3   PROTTOTAL  --   --   --  7.2   BILITOTAL  --   --   --  0.9   ALKPHOS  --   --   --  66   ALT  --   --   --  21   AST  --   --   --  22     Recent Results (from the past 24 hour(s))   XR Chest Port 1 View    Narrative    PROCEDURE:  XR CHEST PORT 1 VIEW    HISTORY: trauma. .    COMPARISON:  9/11/2021    FINDINGS:    The cardiomediastinal contours are stable. There is calcific aortic  atherosclerosis.   Advanced emphysematous changes are again seen. Some superimposed  consolidation is questioned at the left lung base. No pneumothorax or  gross bony trauma is identified.      Impression    IMPRESSION:  Advanced emphysema. Question focal consolidation at the  left lung base. Recommend follow-up.      NAVDEEP OLSON MD         SYSTEM ID:  [id number]   CT Chest/Abdomen/Pelvis w Contrast    Narrative    PROCEDURE: CT CHEST/ABDOMEN/PELVIS W CONTRAST 8/12/2022 3:40 PM    HISTORY: trauma 2 days ago, SOB.  left rib and abdominal injury    COMPARISONS: None.    Meds/Dose Given: Isovue 370, 92 mL    TECHNIQUE: CT scan of the abdomen and pelvis with IV contrast sagittal  and coronal reconstructions were obtained    FINDINGS: There are fractures of the left eighth and ninth ribs. No  pneumothorax or  pleural effusion is seen. There are some atelectatic  changes seen at the left lung base.    There is a small low-density lesion peripherally in the right lobe of  the liver most likely a cyst or hemangioma. No calcified gallstones  are seen. No splenic injury is seen. The pancreas appears normal. The  adrenal glands are normal. There is a benign-appearing cyst seen in  the right kidney. No renal injury is seen. There is no  hydronephrosis.. The periaortic lymph nodes are normal in caliber. No  free air or free fluid is seen within the abdomen. The appendix is  normal. No intraperitoneal masses or inflammatory changes are noted.  The bladder and rectum are normal. The prostate is enlarged.    There is spondylolisthesis of L3 on L4 and L4 on L5 severe lower  lumbar facet joint degenerative changes are noted. No pelvic fracture  is noted. Both proximal femurs are intact.         Impression    IMPRESSION: Left lower rib fractures. Only the lower ribs were studied  on this CT scan of the abdomen and pelvis. No pneumothorax or pleural  effusion is    Rib Trauma: 1 or 2 acute rib fractures     PANCHO DUARTE MD         SYSTEM ID:  [id number]   CT Chest w/o Contrast    Narrative    PROCEDURE: CT CHEST W/O CONTRAST 8/12/2022 4:16 PM    HISTORY: recent trauma, rib concern    COMPARISONS: None.    Meds/Dose Given:    TECHNIQUE: CT scan of the chest without IV contrast sagittal coronal  reconstructions were obtained    FINDINGS: There are fractures of the sixth, seventh, eighth and ninth  ribs. No associated pneumothorax is seen. There is some minimal  pleural thickening or effusion seen in the left hemithorax. No  thoracic fracture is seen. Sternum is intact.    Severe emphysematous changes are noted in the lungs. There is some  linear scarring seen in both lungs. In the right lower lobe there is a  subpleural nodule measuring 9 mm. This nodule is unchanged from a  previous CT of the chest at 2019.    The hilar and  mediastinal lymph nodes are normal in caliber. Axillary  and supraclavicular lymph nodes are normal. The heart is normal in  size         Impression    IMPRESSION: Fractures of the left sixth, seventh, eighth and ninth  ribs. There is no left-sided pneumothorax. A small pleural effusion is  seen    Rib Trauma: 3+ acute rib fractures     PANCHO DUARTE MD         SYSTEM ID:  [id number]   US Carotid Bilateral    Narrative    PROCEDURE: US CAROTID BILATERAL 8/12/2022 5:59 PM    HISTORY: syncope    COMPARISONS: None.    TECHNIQUE: Duplex ultrasound of the carotids    FINDINGS: There is atherosclerotic plaquing of both carotid  bifurcations. No hemodynamically significant lesions are identified in  the distal common or proximal internal carotid arteries. There is a  hemodynamically significant stenosis noted in the right external  carotid artery.    Forward flow was demonstrated in both vertebral arteries.         Impression    IMPRESSION: No hemodynamically significant stenoses are identified in  the distal common or proximal internal carotid arteries    PANCHO DUARTE MD         SYSTEM ID:  [id number]

## 2022-08-13 NOTE — PLAN OF CARE
"Pt reporting pain between 4-8 throughout the evening, minimally controlled by oral dilaudid.  Pain much less at rest and worse with activity.  Pt reports using nebulizer Q 4 hours at home so provider changed orders to reflect that here as well.  Pt was instructed to give a urine and sputum sample.  Urine collection was successful but pt reports inability to cough up anything at this time.  Pt is cooperative and independent in room.  Declines needing stand-by assist.    BP (!) 161/86 (BP Location: Right arm, Patient Position: Semi-Galeano's, Cuff Size: Adult Regular)   Pulse 59   Temp 97.5  F (36.4  C) (Tympanic)   Resp 16   Ht 1.727 m (5' 8\")   Wt 80.1 kg (176 lb 9.6 oz)   SpO2 93%   BMI 26.85 kg/m        Problem: Plan of Care - These are the overarching goals to be used throughout the patient stay.    Goal: Plan of Care Review/Shift Note  Description: The Plan of Care Review/Shift note should be completed every shift.  The Outcome Evaluation is a brief statement about your assessment that the patient is improving, declining, or no change.  This information will be displayed automatically on your shift note.  Outcome: Ongoing, Progressing  Flowsheets (Taken 8/13/2022 0402)  Plan of Care Reviewed With: patient  Overall Patient Progress: improving  Goal: Patient-Specific Goal (Individualized)  Description: You can add care plan individualizations to a care plan. Examples of Individualization might be:  \"Parent requests to be called daily at 9am for status\", \"I have a hard time hearing out of my right ear\", or \"Do not touch me to wake me up as it startles me\".  Outcome: Ongoing, Progressing  Goal: Absence of Hospital-Acquired Illness or Injury  Outcome: Ongoing, Progressing  Intervention: Prevent and Manage VTE (Venous Thromboembolism) Risk  Recent Flowsheet Documentation  Taken 8/12/2022 1930 by Gladis Sheffield, RN  Activity Management: activity adjusted per tolerance  Goal: Optimal Comfort and " Wellbeing  Outcome: Ongoing, Progressing  Intervention: Monitor Pain and Promote Comfort  Recent Flowsheet Documentation  Taken 8/12/2022 2130 by Gladis Sheffield, RN  Pain Management Interventions: declines  Goal: Readiness for Transition of Care  Outcome: Ongoing, Progressing     Problem: Pain Acute  Goal: Acceptable Pain Control and Functional Ability  Outcome: Ongoing, Progressing  Intervention: Develop Pain Management Plan  Recent Flowsheet Documentation  Taken 8/12/2022 2130 by Gladis Sheffield, RN  Pain Management Interventions: declines     Problem: Fluid Imbalance (Pneumonia)  Goal: Fluid Balance  Outcome: Ongoing, Progressing     Problem: Infection (Pneumonia)  Goal: Resolution of Infection Signs and Symptoms  Outcome: Ongoing, Progressing     Problem: Respiratory Compromise (Pneumonia)  Goal: Effective Oxygenation and Ventilation  Outcome: Ongoing, Progressing   Goal Outcome Evaluation:    Plan of Care Reviewed With: patient     Overall Patient Progress: improving

## 2022-08-14 PROBLEM — J96.21 ACUTE ON CHRONIC RESPIRATORY FAILURE WITH HYPOXIA (H): Status: ACTIVE | Noted: 2021-09-12

## 2022-08-14 LAB
ATRIAL RATE - MUSE: 57 BPM
ATRIAL RATE - MUSE: 85 BPM
DIASTOLIC BLOOD PRESSURE - MUSE: NORMAL MMHG
DIASTOLIC BLOOD PRESSURE - MUSE: NORMAL MMHG
GLUCOSE BLDC GLUCOMTR-MCNC: 130 MG/DL (ref 70–99)
GLUCOSE BLDC GLUCOMTR-MCNC: 137 MG/DL (ref 70–99)
GLUCOSE BLDC GLUCOMTR-MCNC: 87 MG/DL (ref 70–99)
INTERPRETATION ECG - MUSE: NORMAL
INTERPRETATION ECG - MUSE: NORMAL
MAGNESIUM SERPL-MCNC: 2.2 MG/DL (ref 1.9–2.7)
P AXIS - MUSE: 44 DEGREES
P AXIS - MUSE: 93 DEGREES
PR INTERVAL - MUSE: 186 MS
PR INTERVAL - MUSE: 202 MS
QRS DURATION - MUSE: 68 MS
QRS DURATION - MUSE: 84 MS
QT - MUSE: 360 MS
QT - MUSE: 400 MS
QTC - MUSE: 389 MS
QTC - MUSE: 428 MS
R AXIS - MUSE: -3 DEGREES
R AXIS - MUSE: -44 DEGREES
SYSTOLIC BLOOD PRESSURE - MUSE: NORMAL MMHG
SYSTOLIC BLOOD PRESSURE - MUSE: NORMAL MMHG
T AXIS - MUSE: 27 DEGREES
T AXIS - MUSE: 73 DEGREES
VENTRICULAR RATE- MUSE: 57 BPM
VENTRICULAR RATE- MUSE: 85 BPM

## 2022-08-14 PROCEDURE — 999N000157 HC STATISTIC RCP TIME EA 10 MIN

## 2022-08-14 PROCEDURE — 99232 SBSQ HOSP IP/OBS MODERATE 35: CPT | Performed by: FAMILY MEDICINE

## 2022-08-14 PROCEDURE — 94642 AEROSOL INHALATION TREATMENT: CPT

## 2022-08-14 PROCEDURE — 250N000013 HC RX MED GY IP 250 OP 250 PS 637: Performed by: SURGERY

## 2022-08-14 PROCEDURE — 250N000012 HC RX MED GY IP 250 OP 636 PS 637: Performed by: FAMILY MEDICINE

## 2022-08-14 PROCEDURE — 250N000011 HC RX IP 250 OP 636: Performed by: STUDENT IN AN ORGANIZED HEALTH CARE EDUCATION/TRAINING PROGRAM

## 2022-08-14 PROCEDURE — 36415 COLL VENOUS BLD VENIPUNCTURE: CPT | Performed by: FAMILY MEDICINE

## 2022-08-14 PROCEDURE — 83735 ASSAY OF MAGNESIUM: CPT | Performed by: FAMILY MEDICINE

## 2022-08-14 PROCEDURE — 120N000001 HC R&B MED SURG/OB

## 2022-08-14 PROCEDURE — 99232 SBSQ HOSP IP/OBS MODERATE 35: CPT | Mod: 25 | Performed by: SURGERY

## 2022-08-14 PROCEDURE — 250N000013 HC RX MED GY IP 250 OP 250 PS 637: Performed by: FAMILY MEDICINE

## 2022-08-14 PROCEDURE — 250N000009 HC RX 250: Performed by: FAMILY MEDICINE

## 2022-08-14 PROCEDURE — 94640 AIRWAY INHALATION TREATMENT: CPT

## 2022-08-14 PROCEDURE — 250N000011 HC RX IP 250 OP 636: Performed by: FAMILY MEDICINE

## 2022-08-14 PROCEDURE — 250N000011 HC RX IP 250 OP 636: Performed by: SURGERY

## 2022-08-14 PROCEDURE — 94640 AIRWAY INHALATION TREATMENT: CPT | Mod: 76

## 2022-08-14 PROCEDURE — 258N000003 HC RX IP 258 OP 636: Performed by: FAMILY MEDICINE

## 2022-08-14 RX ORDER — BISACODYL 10 MG
10 SUPPOSITORY, RECTAL RECTAL DAILY PRN
Status: DISCONTINUED | OUTPATIENT
Start: 2022-08-14 | End: 2022-08-17 | Stop reason: HOSPADM

## 2022-08-14 RX ORDER — CALCIUM CARBONATE 500 MG/1
1000 TABLET, CHEWABLE ORAL 3 TIMES DAILY PRN
Status: DISCONTINUED | OUTPATIENT
Start: 2022-08-14 | End: 2022-08-17 | Stop reason: HOSPADM

## 2022-08-14 RX ORDER — PROCHLORPERAZINE MALEATE 5 MG
5 TABLET ORAL EVERY 6 HOURS PRN
Status: DISCONTINUED | OUTPATIENT
Start: 2022-08-14 | End: 2022-08-17 | Stop reason: HOSPADM

## 2022-08-14 RX ORDER — PROCHLORPERAZINE 25 MG
12.5 SUPPOSITORY, RECTAL RECTAL EVERY 12 HOURS PRN
Status: DISCONTINUED | OUTPATIENT
Start: 2022-08-14 | End: 2022-08-17 | Stop reason: HOSPADM

## 2022-08-14 RX ORDER — ONDANSETRON 2 MG/ML
4 INJECTION INTRAMUSCULAR; INTRAVENOUS ONCE
Status: COMPLETED | OUTPATIENT
Start: 2022-08-14 | End: 2022-08-14

## 2022-08-14 RX ORDER — PANTOPRAZOLE SODIUM 40 MG/1
40 TABLET, DELAYED RELEASE ORAL
Status: DISCONTINUED | OUTPATIENT
Start: 2022-08-14 | End: 2022-08-17 | Stop reason: HOSPADM

## 2022-08-14 RX ORDER — ALBUTEROL SULFATE 0.83 MG/ML
2.5 SOLUTION RESPIRATORY (INHALATION) 4 TIMES DAILY
Status: DISCONTINUED | OUTPATIENT
Start: 2022-08-14 | End: 2022-08-16

## 2022-08-14 RX ORDER — KETOROLAC TROMETHAMINE 15 MG/ML
15 INJECTION, SOLUTION INTRAMUSCULAR; INTRAVENOUS EVERY 6 HOURS
Status: DISCONTINUED | OUTPATIENT
Start: 2022-08-14 | End: 2022-08-16

## 2022-08-14 RX ADMIN — HYDROMORPHONE HYDROCHLORIDE 2 MG: 2 TABLET ORAL at 21:36

## 2022-08-14 RX ADMIN — ACETAMINOPHEN 975 MG: 325 TABLET, FILM COATED ORAL at 15:59

## 2022-08-14 RX ADMIN — KETOROLAC TROMETHAMINE 15 MG: 15 INJECTION, SOLUTION INTRAMUSCULAR; INTRAVENOUS at 11:08

## 2022-08-14 RX ADMIN — HYDROMORPHONE HYDROCHLORIDE 0.5 MG: 1 INJECTION, SOLUTION INTRAMUSCULAR; INTRAVENOUS; SUBCUTANEOUS at 13:20

## 2022-08-14 RX ADMIN — ASPIRIN 81 MG: 81 TABLET, COATED ORAL at 09:46

## 2022-08-14 RX ADMIN — LISINOPRIL 20 MG: 20 TABLET ORAL at 09:46

## 2022-08-14 RX ADMIN — ONDANSETRON 4 MG: 4 TABLET, ORALLY DISINTEGRATING ORAL at 16:30

## 2022-08-14 RX ADMIN — HYDROMORPHONE HYDROCHLORIDE 0.5 MG: 1 INJECTION, SOLUTION INTRAMUSCULAR; INTRAVENOUS; SUBCUTANEOUS at 11:08

## 2022-08-14 RX ADMIN — AMLODIPINE BESYLATE 2.5 MG: 2.5 TABLET ORAL at 09:46

## 2022-08-14 RX ADMIN — PANTOPRAZOLE SODIUM 40 MG: 40 TABLET, DELAYED RELEASE ORAL at 10:06

## 2022-08-14 RX ADMIN — ACETAMINOPHEN 975 MG: 325 TABLET, FILM COATED ORAL at 09:45

## 2022-08-14 RX ADMIN — CEFTRIAXONE SODIUM 2 G: 2 INJECTION, SOLUTION INTRAVENOUS at 13:56

## 2022-08-14 RX ADMIN — ACETAMINOPHEN 975 MG: 325 TABLET, FILM COATED ORAL at 22:52

## 2022-08-14 RX ADMIN — CALCIUM CARBONATE (ANTACID) CHEW TAB 500 MG 1000 MG: 500 CHEW TAB at 21:46

## 2022-08-14 RX ADMIN — HYDROMORPHONE HYDROCHLORIDE 2 MG: 2 TABLET ORAL at 07:29

## 2022-08-14 RX ADMIN — ONDANSETRON 4 MG: 2 INJECTION INTRAMUSCULAR; INTRAVENOUS at 11:13

## 2022-08-14 RX ADMIN — HYDROMORPHONE HYDROCHLORIDE 0.5 MG: 1 INJECTION, SOLUTION INTRAMUSCULAR; INTRAVENOUS; SUBCUTANEOUS at 15:56

## 2022-08-14 RX ADMIN — FLUTICASONE FUROATE AND VILANTEROL TRIFENATATE 1 PUFF: 100; 25 POWDER RESPIRATORY (INHALATION) at 06:40

## 2022-08-14 RX ADMIN — STANDARDIZED SENNA CONCENTRATE 8.6 MG: 8.6 TABLET ORAL at 21:26

## 2022-08-14 RX ADMIN — PROCHLORPERAZINE EDISYLATE 5 MG: 5 INJECTION INTRAMUSCULAR; INTRAVENOUS at 20:07

## 2022-08-14 RX ADMIN — KETOROLAC TROMETHAMINE 15 MG: 15 INJECTION, SOLUTION INTRAMUSCULAR; INTRAVENOUS at 05:06

## 2022-08-14 RX ADMIN — PREDNISONE 40 MG: 20 TABLET ORAL at 09:45

## 2022-08-14 RX ADMIN — MONTELUKAST SODIUM 10 MG: 5 TABLET, CHEWABLE ORAL at 21:26

## 2022-08-14 RX ADMIN — POLYETHYLENE GLYCOL 3350 17 G: 17 POWDER, FOR SOLUTION ORAL at 09:44

## 2022-08-14 RX ADMIN — ENOXAPARIN SODIUM 40 MG: 40 INJECTION SUBCUTANEOUS at 21:48

## 2022-08-14 RX ADMIN — UMECLIDINIUM 1 PUFF: 62.5 AEROSOL, POWDER ORAL at 06:40

## 2022-08-14 RX ADMIN — BISACODYL 10 MG: 10 SUPPOSITORY RECTAL at 10:02

## 2022-08-14 RX ADMIN — ACETAMINOPHEN 975 MG: 325 TABLET, FILM COATED ORAL at 05:05

## 2022-08-14 RX ADMIN — AZITHROMYCIN MONOHYDRATE 250 MG: 500 INJECTION, POWDER, LYOPHILIZED, FOR SOLUTION INTRAVENOUS at 09:44

## 2022-08-14 RX ADMIN — ALBUTEROL SULFATE 2.5 MG: 2.5 SOLUTION RESPIRATORY (INHALATION) at 06:40

## 2022-08-14 RX ADMIN — KETOROLAC TROMETHAMINE 15 MG: 15 INJECTION, SOLUTION INTRAMUSCULAR; INTRAVENOUS at 18:08

## 2022-08-14 RX ADMIN — ONDANSETRON 4 MG: 2 INJECTION INTRAMUSCULAR; INTRAVENOUS at 05:08

## 2022-08-14 RX ADMIN — ALBUTEROL SULFATE 2.5 MG: 2.5 SOLUTION RESPIRATORY (INHALATION) at 11:22

## 2022-08-14 RX ADMIN — HYDROMORPHONE HYDROCHLORIDE 2 MG: 2 TABLET ORAL at 01:03

## 2022-08-14 RX ADMIN — ALBUTEROL SULFATE 2.5 MG: 2.5 SOLUTION RESPIRATORY (INHALATION) at 16:04

## 2022-08-14 RX ADMIN — ONDANSETRON 4 MG: 4 TABLET, ORALLY DISINTEGRATING ORAL at 01:03

## 2022-08-14 ASSESSMENT — ACTIVITIES OF DAILY LIVING (ADL)
ADLS_ACUITY_SCORE: 22

## 2022-08-14 NOTE — ASSESSMENT & PLAN NOTE
Episode at home where he was unconscious for 10 to 15 seconds causing a fall and subsequent rib fracture  Etiology of syncope not clear, noted to be bradycardic in the ED  Not clear if pneumonia had been present prior leading to weakness and syncope/fall or if syncope was primary event  Has been on telemetry with some episodes of dropped beats, otherwise unremarkable  Echo today appear normal with normal EF and function  At discharge will order cardiac monitoring

## 2022-08-14 NOTE — ASSESSMENT & PLAN NOTE
Rib fractures 6 through 9 on the left side result of fall  Pain control, encourage deep cough and breathing  8/17/2022-Home with short course of oral dilaudid, encourage good pulmonary toilet

## 2022-08-14 NOTE — ASSESSMENT & PLAN NOTE
Noted, having some episodes of dropped beats  Continue current monitoring  Magnesium low yesterday has been replaced

## 2022-08-14 NOTE — PROGRESS NOTES
[facility]    Medicine Progress Note - Hospitalist Service    Date of Admission:  8/12/2022    Assessment & Plan            Syncope  Episode at home where he was unconscious for 10 to 15 seconds causing a fall and subsequent rib fracture  Etiology of syncope not clear, noted to be bradycardic in the ED  Has been on telemetry with some episodes of dropped beats, otherwise unremarkable  Planning for echocardiogram tomorrow to assess cardiac function.  At discharge will need cardiac monitoring for a period of time.    Acute on chronic respiratory failure with hypoxia (H)  Uses oxygen at home at 2 L intermittently, currently at 5 L  May be due to underlying COPD exacerbation as well as rib fracture with reduced rib movement  Continue oxygen, wean as able    Closed fracture of multiple ribs  Rib fractures 6 through 9 on the left side result of fall  Pain control, encourage deep cough and breathing    Pneumonia of left lower lobe due to infectious organism  Noted on imaging, currently on Rocephin and azithromycin  Continue current dosing    Bradycardia  Noted, having some episodes of dropped beats  Continue current monitoring  Magnesium low yesterday has been replaced    Stage 3 severe COPD by GOLD classification (H)  Increased respiratory distress at admission consider possible exacerbation of COPD  Currently on steroids with frequent scheduled neb treatments  Continue           Diet: Combination Diet Regular Diet Adult    DVT Prophylaxis: Enoxaparin (Lovenox) SQ  Burris Catheter: Not present  Central Lines: None  Cardiac Monitoring: ACTIVE order. Indication: Syncope- low cardiac risk (24 hours)  Code Status: Full Code      Disposition Plan    likely home when 1 to 2 days     The patient's care was discussed with the Bedside Nurse and Patient.    Bryan Mason MD  Hospitalist Service  [facility]  Securely message with the Vocera Web Console (learn more here)  Text page  "via [facility] Paging/Directory         Clinically Significant Risk Factors Present on Admission               # Overweight: Estimated body mass index is 26.85 kg/m  as calculated from the following:    Height as of this encounter: 1.727 m (5' 8\").    Weight as of this encounter: 80.1 kg (176 lb 9.6 oz).        ______________________________________________________________________    Interval History   Feeling somewhat better, Pain better in ribs, still hard to move about. No fevers, appetite better. Having some constipation issues, working with nursing to resolve.     Data reviewed today: I reviewed all medications, new labs and imaging results over the last 24 hours. I personally reviewed no images or EKG's today.    Physical Exam   Vital Signs: Temp: 97.3  F (36.3  C) Temp src: Tympanic BP: 134/70 Pulse: 67   Resp: 18 SpO2: 90 % O2 Device: Nasal cannula Oxygen Delivery: 5 LPM  Weight: 176 lbs 9.6 oz  Constitutional: awake, alert, cooperative, mild distress and appears stated age  Respiratory: Lungs with reduced air flow,  Tight in periphery, some wheezing  Cardiovascular: regular rate and rhythm  GI: normal bowel sounds, soft and non-distended    Data   Recent Labs   Lab 08/14/22  0712 08/13/22  1734 08/13/22  1134 08/13/22  0551 08/12/22  2244 08/12/22  1443   WBC  --   --   --  6.9  --  8.6   HGB  --   --   --  14.7  --  17.0   MCV  --   --   --  98  --  97   PLT  --   --   --  175  --  179   INR  --   --   --   --   --  1.02   NA  --   --   --  140  --  137   POTASSIUM  --   --   --  4.4  --  4.7   CHLORIDE  --   --   --  106  --  100   CO2  --   --   --  27  --  28   BUN  --   --   --  22  --  27*   CR  --   --   --  0.89  --  1.15   ANIONGAP  --   --   --  7  --  9   MARCY  --   --   --  8.7  --  10.3   GLC 87 94 80 81   < > 108*   ALBUMIN  --   --   --   --   --  4.3   PROTTOTAL  --   --   --   --   --  7.2   BILITOTAL  --   --   --   --   --  0.9   ALKPHOS  --   --   --   --   --  66   ALT  --   --   --   --   -- "  21   AST  --   --   --   --   --  22    < > = values in this interval not displayed.

## 2022-08-14 NOTE — PLAN OF CARE
"Pt has been working on pain control. Moderately controlled but pain still prevents some activities.  Alternating 975 mg of oral tylenol every 6 hours with 15 mg of Toradol IV and 2 mg oral dilaudid in between.  Pt has not had a bowel movement since 8/11.  Received PRN senna.  Has been experiencing nausea intermittently and is receiving PRN zofran which has been effective.  Oxygen increased to 5L to keep patients sats above 90%.    /74 (BP Location: Right arm, Patient Position: Semi-Galeano's, Cuff Size: Adult Regular)   Pulse 56   Temp (!) 96.6  F (35.9  C) (Tympanic)   Resp 18   Ht 1.727 m (5' 8\")   Wt 79.4 kg (175 lb)   SpO2 92%   BMI 26.61 kg/m         Problem: Plan of Care - These are the overarching goals to be used throughout the patient stay.    Goal: Plan of Care Review/Shift Note  Description: The Plan of Care Review/Shift note should be completed every shift.  The Outcome Evaluation is a brief statement about your assessment that the patient is improving, declining, or no change.  This information will be displayed automatically on your shift note.  Outcome: Ongoing, Not Progressing  Flowsheets (Taken 8/14/2022 0522)  Plan of Care Reviewed With: patient  Overall Patient Progress: no change  Goal: Patient-Specific Goal (Individualized)  Description: You can add care plan individualizations to a care plan. Examples of Individualization might be:  \"Parent requests to be called daily at 9am for status\", \"I have a hard time hearing out of my right ear\", or \"Do not touch me to wake me up as it startles me\".  Outcome: Ongoing, Not Progressing  Goal: Absence of Hospital-Acquired Illness or Injury  Outcome: Ongoing, Not Progressing  Intervention: Identify and Manage Fall Risk  Recent Flowsheet Documentation  Taken 8/14/2022 0506 by Gladis Sheffield, RN  Safety Promotion/Fall Prevention: safety round/check completed  Taken 8/13/2022 2044 by Gladis Sheffield, RN  Safety Promotion/Fall Prevention: safety " round/check completed  Intervention: Prevent Skin Injury  Recent Flowsheet Documentation  Taken 8/14/2022 0506 by Gladis Sheffield RN  Body Position: position changed independently  Taken 8/13/2022 2044 by Gladis Sheffield RN  Body Position: position changed independently  Intervention: Prevent and Manage VTE (Venous Thromboembolism) Risk  Recent Flowsheet Documentation  Taken 8/14/2022 0506 by Gladis Sheffield RN  VTE Prevention/Management: (pt requests not to wear) SCDs (sequential compression devices) off  Activity Management: activity adjusted per tolerance  Taken 8/13/2022 2044 by Gladis Sheffield RN  VTE Prevention/Management: (pt requests not to wear) SCDs (sequential compression devices) off  Activity Management: activity adjusted per tolerance  Goal: Optimal Comfort and Wellbeing  Outcome: Ongoing, Not Progressing  Intervention: Monitor Pain and Promote Comfort  Recent Flowsheet Documentation  Taken 8/13/2022 2044 by Gladis Sheffield RN  Pain Management Interventions: medication (see MAR)  Goal: Readiness for Transition of Care  Outcome: Ongoing, Not Progressing     Problem: Pain Acute  Goal: Acceptable Pain Control and Functional Ability  Outcome: Ongoing, Not Progressing  Intervention: Develop Pain Management Plan  Recent Flowsheet Documentation  Taken 8/13/2022 2044 by Gladis Sheffield RN  Pain Management Interventions: medication (see MAR)  Intervention: Prevent or Manage Pain  Recent Flowsheet Documentation  Taken 8/14/2022 0506 by Gladis Sheffield RN  Medication Review/Management: medications reviewed  Taken 8/13/2022 2044 by Gladis Sheffield RN  Medication Review/Management: medications reviewed     Problem: Fluid Imbalance (Pneumonia)  Goal: Fluid Balance  Outcome: Ongoing, Not Progressing     Problem: Infection (Pneumonia)  Goal: Resolution of Infection Signs and Symptoms  Outcome: Ongoing, Not Progressing     Problem: Respiratory Compromise (Pneumonia)  Goal: Effective Oxygenation and  Ventilation  Outcome: Ongoing, Not Progressing  Intervention: Promote Airway Secretion Clearance  Recent Flowsheet Documentation  Taken 8/14/2022 0506 by Gladis Sheffield RN  Cough And Deep Breathing: done independently per patient  Taken 8/13/2022 2044 by Gladis Sheffield RN  Cough And Deep Breathing: done independently per patient  Intervention: Optimize Oxygenation and Ventilation  Recent Flowsheet Documentation  Taken 8/14/2022 0506 by Gladis Sheffield, RN  Head of Bed (HOB) Positioning: HOB at 30 degrees  Taken 8/13/2022 2044 by Gladis Sheffield RN  Head of Bed (HOB) Positioning: HOB at 30 degrees   Goal Outcome Evaluation:    Plan of Care Reviewed With: patient     Overall Patient Progress: no change

## 2022-08-14 NOTE — ASSESSMENT & PLAN NOTE
Noted on imaging, currently on Rocephin and azithromycin  8/16/2022-clinically improved, will change over to oral meds  8/17/2022-at discharge home on oral ceftin

## 2022-08-14 NOTE — PROGRESS NOTES
SAFETY CHECKLIST  ID Bands and Risk clasps correct and in place (DNR, Fall risk, Allergy, Latex, Limb):  Yes  All Lines Reconciled and labeled correctly: Yes  Whiteboard updated:Yes  Environmental interventions (bed/chair alarm on, call light, side rails, restraints, sitter....): Yes  Verify Tele #: 1

## 2022-08-14 NOTE — ASSESSMENT & PLAN NOTE
Uses oxygen at home at 2 L intermittently, currently at 5 L  May be due to underlying COPD exacerbation as well as rib fracture with reduced rib movement  5/16/2022-still on 4 liters NC, less short of air, moving in room more

## 2022-08-14 NOTE — ASSESSMENT & PLAN NOTE
Increased respiratory distress at admission consider possible exacerbation of COPD  Currently on steroids with frequent scheduled neb treatments  8/17/2022-home on short course of steroids with taper

## 2022-08-14 NOTE — PLAN OF CARE
"Patient continues to struggle with pain relief.  PRN IV dilaudid used along with scheduled tylenol and toradol with minor relief at times.  Rates pain 6-8/10 throughout day.  Was given suppository today, little relief from that.  Gave prune juice as well along with scheduled miralax.  Encouraged water.  Did have some loose watery stool after some hard pellets, but nothing since.  Abdomen still hard and rounded.  May want enema, will continue to offer prune juice.  Up to bathroom independently, wife here to visit for short time today.  Oxygen sats 88-92% on 5 lpm.  Was up to 7 LPM at one point after talking for long time with visitor, back down to 5 Lpm. Sats drop with each trip to the bathroom or long winded conversation.  Encouraged incentive spirometer use.    BP (!) 167/80 (BP Location: Right arm, Patient Position: Sitting, Cuff Size: Adult Regular)   Pulse 93   Temp 98.3  F (36.8  C) (Tympanic)   Resp 22   Ht 1.727 m (5' 8\")   Wt 80.1 kg (176 lb 9.6 oz)   SpO2 92%   BMI 26.85 kg/m        Goal Outcome Evaluation:    Plan of Care Reviewed With: patient     Overall Patient Progress: no change      Problem: Plan of Care - These are the overarching goals to be used throughout the patient stay.    Goal: Plan of Care Review/Shift Note  Description: The Plan of Care Review/Shift note should be completed every shift.  The Outcome Evaluation is a brief statement about your assessment that the patient is improving, declining, or no change.  This information will be displayed automatically on your shift note.  Outcome: Ongoing, Progressing  Flowsheets (Taken 8/14/2022 1327)  Plan of Care Reviewed With: patient  Overall Patient Progress: no change         "

## 2022-08-14 NOTE — PROGRESS NOTES
GENERAL SURGERY PROGRESS NOTE  8/14/2022      Interval history:   No acute events yesterday. Pain is slightly improved. Denies fevers or chills. Tolerating oral intake. Complains of constipation.     Physical Exam:   Vital signs are reviewed and there are no significant abnormalities. Remains on 5 LPM NC O2.     UOP over past 24 hours is 1275 mL    General: laying in bed, appears comfortable   HEENT: nasal cannula O2 in place   Lungs: slight increased work of breathing, stable bilateral wheeze, good air entry bilaterally   CV: RRR, no murmur   Abdomen: soft     Labs are reviewed and are significant for Mg 2.2    No new imaging       Assessment / Plan:   Brent Villagomez is a 77 year old male with multiple, nondisplaced left-sided rib fractures from same level fall.     Continue scheduled tylenol, toradol and PRN narcotics for breakthrough pain   Continue aggressive pulm toilet, wean supplemental O2 as able back to baseline           PERRY Mcgovern MD   8/14/2022

## 2022-08-14 NOTE — PROGRESS NOTES
Patient remained stable throughout the shift no changes made. Patient currently on 5L NC Patient given treatments Scheduled.    Jon Joshi,  on 8/14/2022 at 6:43 AM

## 2022-08-15 ENCOUNTER — APPOINTMENT (OUTPATIENT)
Dept: CARDIOLOGY | Facility: OTHER | Age: 77
DRG: 193 | End: 2022-08-15
Attending: FAMILY MEDICINE
Payer: COMMERCIAL

## 2022-08-15 LAB
BACTERIA SPT CULT: ABNORMAL
GLUCOSE BLDC GLUCOMTR-MCNC: 76 MG/DL (ref 70–99)
GLUCOSE BLDC GLUCOMTR-MCNC: 86 MG/DL (ref 70–99)
GLUCOSE BLDC GLUCOMTR-MCNC: 88 MG/DL (ref 70–99)
GRAM STAIN RESULT: ABNORMAL
LVEF ECHO: NORMAL

## 2022-08-15 PROCEDURE — 250N000011 HC RX IP 250 OP 636: Performed by: FAMILY MEDICINE

## 2022-08-15 PROCEDURE — 94640 AIRWAY INHALATION TREATMENT: CPT | Mod: 76

## 2022-08-15 PROCEDURE — 99232 SBSQ HOSP IP/OBS MODERATE 35: CPT | Performed by: FAMILY MEDICINE

## 2022-08-15 PROCEDURE — 94640 AIRWAY INHALATION TREATMENT: CPT

## 2022-08-15 PROCEDURE — 250N000013 HC RX MED GY IP 250 OP 250 PS 637: Performed by: SURGERY

## 2022-08-15 PROCEDURE — 93306 TTE W/DOPPLER COMPLETE: CPT

## 2022-08-15 PROCEDURE — 250N000012 HC RX MED GY IP 250 OP 636 PS 637: Performed by: FAMILY MEDICINE

## 2022-08-15 PROCEDURE — 250N000009 HC RX 250: Performed by: FAMILY MEDICINE

## 2022-08-15 PROCEDURE — 250N000011 HC RX IP 250 OP 636: Performed by: SURGERY

## 2022-08-15 PROCEDURE — 258N000003 HC RX IP 258 OP 636: Performed by: FAMILY MEDICINE

## 2022-08-15 PROCEDURE — 120N000001 HC R&B MED SURG/OB

## 2022-08-15 PROCEDURE — 93306 TTE W/DOPPLER COMPLETE: CPT | Mod: 26 | Performed by: INTERNAL MEDICINE

## 2022-08-15 PROCEDURE — 999N000157 HC STATISTIC RCP TIME EA 10 MIN

## 2022-08-15 PROCEDURE — 250N000013 HC RX MED GY IP 250 OP 250 PS 637: Performed by: FAMILY MEDICINE

## 2022-08-15 RX ADMIN — CEFTRIAXONE SODIUM 2 G: 2 INJECTION, SOLUTION INTRAVENOUS at 11:23

## 2022-08-15 RX ADMIN — MONTELUKAST SODIUM 10 MG: 5 TABLET, CHEWABLE ORAL at 21:48

## 2022-08-15 RX ADMIN — HYDROMORPHONE HYDROCHLORIDE 2 MG: 2 TABLET ORAL at 21:48

## 2022-08-15 RX ADMIN — KETOROLAC TROMETHAMINE 15 MG: 15 INJECTION, SOLUTION INTRAMUSCULAR; INTRAVENOUS at 05:22

## 2022-08-15 RX ADMIN — PREDNISONE 40 MG: 20 TABLET ORAL at 09:42

## 2022-08-15 RX ADMIN — UMECLIDINIUM 1 PUFF: 62.5 AEROSOL, POWDER ORAL at 06:29

## 2022-08-15 RX ADMIN — HYDROMORPHONE HYDROCHLORIDE 2 MG: 2 TABLET ORAL at 16:02

## 2022-08-15 RX ADMIN — FLUTICASONE FUROATE AND VILANTEROL TRIFENATATE 1 PUFF: 100; 25 POWDER RESPIRATORY (INHALATION) at 06:29

## 2022-08-15 RX ADMIN — ONDANSETRON 4 MG: 4 TABLET, ORALLY DISINTEGRATING ORAL at 02:38

## 2022-08-15 RX ADMIN — KETOROLAC TROMETHAMINE 15 MG: 15 INJECTION, SOLUTION INTRAMUSCULAR; INTRAVENOUS at 00:44

## 2022-08-15 RX ADMIN — HYDROMORPHONE HYDROCHLORIDE 2 MG: 2 TABLET ORAL at 08:15

## 2022-08-15 RX ADMIN — ACETAMINOPHEN 975 MG: 325 TABLET, FILM COATED ORAL at 21:48

## 2022-08-15 RX ADMIN — ACETAMINOPHEN 975 MG: 325 TABLET, FILM COATED ORAL at 05:14

## 2022-08-15 RX ADMIN — ACETAMINOPHEN 975 MG: 325 TABLET, FILM COATED ORAL at 16:02

## 2022-08-15 RX ADMIN — ACETAMINOPHEN 975 MG: 325 TABLET, FILM COATED ORAL at 09:42

## 2022-08-15 RX ADMIN — PANTOPRAZOLE SODIUM 40 MG: 40 TABLET, DELAYED RELEASE ORAL at 08:06

## 2022-08-15 RX ADMIN — AZITHROMYCIN MONOHYDRATE 250 MG: 500 INJECTION, POWDER, LYOPHILIZED, FOR SOLUTION INTRAVENOUS at 09:41

## 2022-08-15 RX ADMIN — HYDROMORPHONE HYDROCHLORIDE 2 MG: 2 TABLET ORAL at 02:37

## 2022-08-15 RX ADMIN — ALBUTEROL SULFATE 2.5 MG: 2.5 SOLUTION RESPIRATORY (INHALATION) at 15:15

## 2022-08-15 RX ADMIN — AMLODIPINE BESYLATE 2.5 MG: 2.5 TABLET ORAL at 09:42

## 2022-08-15 RX ADMIN — ALBUTEROL SULFATE 2.5 MG: 2.5 SOLUTION RESPIRATORY (INHALATION) at 18:32

## 2022-08-15 RX ADMIN — KETOROLAC TROMETHAMINE 15 MG: 15 INJECTION, SOLUTION INTRAMUSCULAR; INTRAVENOUS at 18:41

## 2022-08-15 RX ADMIN — ALBUTEROL SULFATE 2.5 MG: 2.5 SOLUTION RESPIRATORY (INHALATION) at 10:58

## 2022-08-15 RX ADMIN — LISINOPRIL 20 MG: 20 TABLET ORAL at 09:41

## 2022-08-15 RX ADMIN — ASPIRIN 81 MG: 81 TABLET, COATED ORAL at 09:42

## 2022-08-15 RX ADMIN — ENOXAPARIN SODIUM 40 MG: 40 INJECTION SUBCUTANEOUS at 21:48

## 2022-08-15 ASSESSMENT — ACTIVITIES OF DAILY LIVING (ADL)
ADLS_ACUITY_SCORE: 22
ADLS_ACUITY_SCORE: 26
ADLS_ACUITY_SCORE: 22

## 2022-08-15 NOTE — PROGRESS NOTES
Patient remained stable throughout the night slight increase o2 need from 4L to 5L due to low SpO2 88% Patient stable no other changes made.    RT Kendal on 8/15/2022 at 6:31 AM

## 2022-08-15 NOTE — PLAN OF CARE
"Pt has infrequent cough which is productive.  Able to clear some sputum and O2 sats improved.  Pt turned down to 4 L in mid-low 90's.  Dyspnea on exertion. Continues to have pain.  Using scheduled tylenol and Toradol with PRN dilaudid. No BM this shift.  Intermittent nausea, utilizing PRN Zofran and compazine.  Slept fairly well this shift.    Problem: Plan of Care - These are the overarching goals to be used throughout the patient stay.    Goal: Plan of Care Review/Shift Note  Description: The Plan of Care Review/Shift note should be completed every shift.  The Outcome Evaluation is a brief statement about your assessment that the patient is improving, declining, or no change.  This information will be displayed automatically on your shift note.  Outcome: Ongoing, Progressing  Flowsheets (Taken 8/15/2022 0602)  Plan of Care Reviewed With: patient  Overall Patient Progress: improving  Goal: Patient-Specific Goal (Individualized)  Description: You can add care plan individualizations to a care plan. Examples of Individualization might be:  \"Parent requests to be called daily at 9am for status\", \"I have a hard time hearing out of my right ear\", or \"Do not touch me to wake me up as it startles me\".  Outcome: Ongoing, Progressing  Goal: Absence of Hospital-Acquired Illness or Injury  Outcome: Ongoing, Progressing  Intervention: Identify and Manage Fall Risk  Recent Flowsheet Documentation  Taken 8/15/2022 0550 by Gladis Sheffield, RN  Safety Promotion/Fall Prevention:   safety round/check completed   room organization consistent   fall prevention program maintained   clutter free environment maintained   activity supervised   supervised activity   nonskid shoes/slippers when out of bed  Taken 8/14/2022 2130 by Gladis Sheffield, RN  Safety Promotion/Fall Prevention:   safety round/check completed   room organization consistent   fall prevention program maintained   clutter free environment maintained   activity " supervised   supervised activity   nonskid shoes/slippers when out of bed  Intervention: Prevent and Manage VTE (Venous Thromboembolism) Risk  Recent Flowsheet Documentation  Taken 8/15/2022 0550 by Gladis Sheffield RN  VTE Prevention/Management: (pt recieving lovenox, refuses SCDs) SCDs (sequential compression devices) off  Activity Management: activity adjusted per tolerance  Taken 8/14/2022 2130 by Gladis Sheffield RN  VTE Prevention/Management: (pt recieving lovenox, refuses SCDs) SCDs (sequential compression devices) off  Activity Management: activity adjusted per tolerance  Goal: Optimal Comfort and Wellbeing  Outcome: Ongoing, Progressing  Goal: Readiness for Transition of Care  Outcome: Ongoing, Progressing     Problem: Pain Acute  Goal: Acceptable Pain Control and Functional Ability  Outcome: Ongoing, Progressing  Intervention: Prevent or Manage Pain  Recent Flowsheet Documentation  Taken 8/15/2022 0550 by Gladis Sheffield RN  Medication Review/Management: medications reviewed  Taken 8/14/2022 2130 by Gladis Sheffield RN  Medication Review/Management: medications reviewed     Problem: Fluid Imbalance (Pneumonia)  Goal: Fluid Balance  Outcome: Ongoing, Progressing     Problem: Infection (Pneumonia)  Goal: Resolution of Infection Signs and Symptoms  Outcome: Ongoing, Progressing     Problem: Respiratory Compromise (Pneumonia)  Goal: Effective Oxygenation and Ventilation  Outcome: Ongoing, Progressing  Intervention: Promote Airway Secretion Clearance  Recent Flowsheet Documentation  Taken 8/15/2022 0550 by Gladis Sheffield RN  Cough And Deep Breathing: done independently per patient  Taken 8/14/2022 2130 by Gladis Sheffield RN  Cough And Deep Breathing: done independently per patient   Goal Outcome Evaluation:    Plan of Care Reviewed With: patient     Overall Patient Progress: improving

## 2022-08-15 NOTE — PROVIDER NOTIFICATION
08/15/22 1132   Valuables   Patient Belongings Remaining with Patient cell phone/electronics;clothing;shoes       [facility] will make every effort per our policy to help keep your items safe while in the hospital.  If you choose to keep any items at the bedside, we cannot be held responsible for any items that are lost or broken.      List items sent to safe: NA    I have reviewed my belongings list on admission and verify that it is correct.     Patient signature_______________________________  Date/Time_____________________    2nd Staff person if patient unable to sign __________________________  Date/Time ______________________      I have received all my belongings noted above at discharge.    Patient signature________________________________  Date/Time  __________________________

## 2022-08-15 NOTE — PLAN OF CARE
Patient is up ad beti. Independent in room. Ambulated in hallway. Rib pain controlled with PRN & scheduled medications. Declined ice or heat therapy. Stridor throughout lung lobes. Productive, infrequent cough. Dyspnea with exertion. O2 88-92% on 5 L via n/c. Patient's O2 sats decrease slightly with activity.     Temp: 98  F (36.7  C) Temp src: Tympanic BP: 124/71 Pulse: 77   Resp: 20 SpO2: (!) 89 % O2 Device: Nasal cannula Oxygen Delivery: 5 LPM

## 2022-08-15 NOTE — PROGRESS NOTES
[facility]    Medicine Progress Note - Hospitalist Service    Date of Admission:  8/12/2022    Assessment & Plan        37-year-old male admitted on 12 August after a fall at home 2 days prior with subsequent diagnosed rib fracture and pneumonia.  Had worsening respiratory failure requiring more oxygen.  Clinically doing better, still on more oxygen than at baseline but improving, less short of air with activity and cough is clearing.  Pain is been controlled with oxycodone.  Plan today is to gradually increase activity looking for potential discharge home in the next 1 to 2 days.  We will continue to wean oxygen as able, continue current antibiotics with the plan to moving to oral meds in the next day or so.  Echocardiogram today was normal, no clear reason for syncopal episode at home.  We will continue to monitor early and at discharge will need outpatient cardiac monitoring for several weeks.    Syncope  Episode at home where he was unconscious for 10 to 15 seconds causing a fall and subsequent rib fracture  Etiology of syncope not clear, noted to be bradycardic in the ED  Has been on telemetry with some episodes of dropped beats, otherwise unremarkable  Echo today appear normal with normal EF and function  At discharge will order cardiac monitoring    Acute on chronic respiratory failure with hypoxia (H)  Uses oxygen at home at 2 L intermittently, currently at 5 L  May be due to underlying COPD exacerbation as well as rib fracture with reduced rib movement  Continue oxygen, wean as able    Closed fracture of multiple ribs  Rib fractures 6 through 9 on the left side result of fall  Pain control, encourage deep cough and breathing    Pneumonia of left lower lobe due to infectious organism  Noted on imaging, currently on Rocephin and azithromycin  Continue current dosing    Bradycardia  Noted, having some episodes of dropped beats  Continue current monitoring  Magnesium low yesterday has been  "replaced    Stage 3 severe COPD by GOLD classification (H)  Increased respiratory distress at admission consider possible exacerbation of COPD  Currently on steroids with frequent scheduled neb treatments  Continue           Diet: Combination Diet Regular Diet Adult    DVT Prophylaxis: Enoxaparin (Lovenox) SQ  Burris Catheter: Not present  Central Lines: None  Cardiac Monitoring: ACTIVE order. Indication: Syncope- low cardiac risk (24 hours)  Code Status: Full Code      Disposition Plan    likely home in the next day or so     The patient's care was discussed with the Patient.    Bryan Mason MD  Hospitalist Service  [facility]  Securely message with the Vocera Web Console (learn more here)  Text page via [employer] Paging/Directory         Clinically Significant Risk Factors Present on Admission               # Overweight: Estimated body mass index is 27.13 kg/m  as calculated from the following:    Height as of this encounter: 1.727 m (5' 8\").    Weight as of this encounter: 80.9 kg (178 lb 6.4 oz).        ______________________________________________________________________    Interval History   Feeling overall better.  Still short of air but feels less so today.  Appetite has improved, having some work with the constipation with some stools.  Starting thing about going home, working on incentive spirometry and coughing up phlegm.  Pain is well controlled with current dosing of oxycodone    Data reviewed today: I reviewed all medications, new labs and imaging results over the last 24 hours. I personally reviewed no images or EKG's today.    Physical Exam   Vital Signs: Temp: 96.8  F (36  C) Temp src: Tympanic BP: 129/78 Pulse: 63   Resp: 18 SpO2: 90 % O2 Device: Nasal cannula Oxygen Delivery: 5 LPM  Weight: 178 lbs 6.4 oz  Constitutional: awake, alert, cooperative, no apparent distress, and appears stated age  Respiratory: Mildly diminished breath sounds bilaterally, no " wheezing  Cardiovascular: regular rate and rhythm  GI: normal bowel sounds, soft and non-distended    Data   Recent Labs   Lab 08/15/22  0710 08/14/22  2223 08/14/22  1614 08/13/22  1134 08/13/22  0551 08/12/22  2244 08/12/22  1443   WBC  --   --   --   --  6.9  --  8.6   HGB  --   --   --   --  14.7  --  17.0   MCV  --   --   --   --  98  --  97   PLT  --   --   --   --  175  --  179   INR  --   --   --   --   --   --  1.02   NA  --   --   --   --  140  --  137   POTASSIUM  --   --   --   --  4.4  --  4.7   CHLORIDE  --   --   --   --  106  --  100   CO2  --   --   --   --  27  --  28   BUN  --   --   --   --  22  --  27*   CR  --   --   --   --  0.89  --  1.15   ANIONGAP  --   --   --   --  7  --  9   MARCY  --   --   --   --  8.7  --  10.3   GLC 86 130* 137*   < > 81   < > 108*   ALBUMIN  --   --   --   --   --   --  4.3   PROTTOTAL  --   --   --   --   --   --  7.2   BILITOTAL  --   --   --   --   --   --  0.9   ALKPHOS  --   --   --   --   --   --  66   ALT  --   --   --   --   --   --  21   AST  --   --   --   --   --   --  22    < > = values in this interval not displayed.

## 2022-08-15 NOTE — PROGRESS NOTES
Pt remained stable throughout the day 5lpm NC. Ambulated pt approximately 300ft with intermittent periods of rest. Pt required up to 15 lpm with ambulation, otherwise tolerated walking well.  Pt expressed shakiness after albuterol treatments. Will continue to monitor patient.

## 2022-08-16 ENCOUNTER — APPOINTMENT (OUTPATIENT)
Dept: OCCUPATIONAL THERAPY | Facility: OTHER | Age: 77
DRG: 193 | End: 2022-08-16
Attending: FAMILY MEDICINE
Payer: COMMERCIAL

## 2022-08-16 ENCOUNTER — APPOINTMENT (OUTPATIENT)
Dept: PHYSICAL THERAPY | Facility: OTHER | Age: 77
DRG: 193 | End: 2022-08-16
Attending: FAMILY MEDICINE
Payer: COMMERCIAL

## 2022-08-16 LAB
MAGNESIUM SERPL-MCNC: 2.1 MG/DL (ref 1.9–2.7)
POTASSIUM BLD-SCNC: 4.3 MMOL/L (ref 3.5–5.1)

## 2022-08-16 PROCEDURE — 250N000013 HC RX MED GY IP 250 OP 250 PS 637: Performed by: FAMILY MEDICINE

## 2022-08-16 PROCEDURE — 97165 OT EVAL LOW COMPLEX 30 MIN: CPT | Mod: GO | Performed by: OCCUPATIONAL THERAPIST

## 2022-08-16 PROCEDURE — 84132 ASSAY OF SERUM POTASSIUM: CPT | Performed by: FAMILY MEDICINE

## 2022-08-16 PROCEDURE — 250N000013 HC RX MED GY IP 250 OP 250 PS 637: Performed by: SURGERY

## 2022-08-16 PROCEDURE — 250N000011 HC RX IP 250 OP 636: Performed by: SURGERY

## 2022-08-16 PROCEDURE — 97161 PT EVAL LOW COMPLEX 20 MIN: CPT | Mod: GP

## 2022-08-16 PROCEDURE — 97530 THERAPEUTIC ACTIVITIES: CPT | Mod: GO | Performed by: OCCUPATIONAL THERAPIST

## 2022-08-16 PROCEDURE — 36415 COLL VENOUS BLD VENIPUNCTURE: CPT | Performed by: FAMILY MEDICINE

## 2022-08-16 PROCEDURE — 999N000157 HC STATISTIC RCP TIME EA 10 MIN

## 2022-08-16 PROCEDURE — 94640 AIRWAY INHALATION TREATMENT: CPT

## 2022-08-16 PROCEDURE — 94640 AIRWAY INHALATION TREATMENT: CPT | Mod: 76

## 2022-08-16 PROCEDURE — 120N000001 HC R&B MED SURG/OB

## 2022-08-16 PROCEDURE — 97116 GAIT TRAINING THERAPY: CPT | Mod: GP

## 2022-08-16 PROCEDURE — 99232 SBSQ HOSP IP/OBS MODERATE 35: CPT | Performed by: FAMILY MEDICINE

## 2022-08-16 PROCEDURE — 83735 ASSAY OF MAGNESIUM: CPT | Performed by: FAMILY MEDICINE

## 2022-08-16 PROCEDURE — 250N000012 HC RX MED GY IP 250 OP 636 PS 637: Performed by: FAMILY MEDICINE

## 2022-08-16 PROCEDURE — 250N000011 HC RX IP 250 OP 636: Performed by: FAMILY MEDICINE

## 2022-08-16 RX ORDER — IBUPROFEN 600 MG/1
600 TABLET, FILM COATED ORAL EVERY 6 HOURS PRN
Status: DISCONTINUED | OUTPATIENT
Start: 2022-08-16 | End: 2022-08-17 | Stop reason: HOSPADM

## 2022-08-16 RX ORDER — CEFUROXIME AXETIL 250 MG/1
500 TABLET ORAL EVERY 12 HOURS SCHEDULED
Status: DISCONTINUED | OUTPATIENT
Start: 2022-08-16 | End: 2022-08-17 | Stop reason: HOSPADM

## 2022-08-16 RX ORDER — LEVALBUTEROL 1.25 MG/.5ML
1.25 SOLUTION, CONCENTRATE RESPIRATORY (INHALATION) EVERY 6 HOURS PRN
Status: DISCONTINUED | OUTPATIENT
Start: 2022-08-16 | End: 2022-08-17 | Stop reason: HOSPADM

## 2022-08-16 RX ORDER — AZITHROMYCIN 250 MG/1
250 TABLET, FILM COATED ORAL DAILY
Status: COMPLETED | OUTPATIENT
Start: 2022-08-16 | End: 2022-08-16

## 2022-08-16 RX ADMIN — ACETAMINOPHEN 975 MG: 325 TABLET, FILM COATED ORAL at 10:41

## 2022-08-16 RX ADMIN — PREDNISONE 40 MG: 20 TABLET ORAL at 09:08

## 2022-08-16 RX ADMIN — HYDROMORPHONE HYDROCHLORIDE 2 MG: 2 TABLET ORAL at 02:33

## 2022-08-16 RX ADMIN — MONTELUKAST SODIUM 10 MG: 5 TABLET, CHEWABLE ORAL at 21:00

## 2022-08-16 RX ADMIN — ACETAMINOPHEN 975 MG: 325 TABLET, FILM COATED ORAL at 04:23

## 2022-08-16 RX ADMIN — FLUTICASONE FUROATE AND VILANTEROL TRIFENATATE 1 PUFF: 100; 25 POWDER RESPIRATORY (INHALATION) at 06:20

## 2022-08-16 RX ADMIN — LISINOPRIL 20 MG: 20 TABLET ORAL at 09:07

## 2022-08-16 RX ADMIN — ASPIRIN 81 MG: 81 TABLET, COATED ORAL at 09:08

## 2022-08-16 RX ADMIN — HYDROMORPHONE HYDROCHLORIDE 2 MG: 2 TABLET ORAL at 15:40

## 2022-08-16 RX ADMIN — KETOROLAC TROMETHAMINE 15 MG: 15 INJECTION, SOLUTION INTRAMUSCULAR; INTRAVENOUS at 05:54

## 2022-08-16 RX ADMIN — UMECLIDINIUM 1 PUFF: 62.5 AEROSOL, POWDER ORAL at 06:20

## 2022-08-16 RX ADMIN — AZITHROMYCIN MONOHYDRATE 250 MG: 250 TABLET ORAL at 09:08

## 2022-08-16 RX ADMIN — PANTOPRAZOLE SODIUM 40 MG: 40 TABLET, DELAYED RELEASE ORAL at 07:27

## 2022-08-16 RX ADMIN — ACETAMINOPHEN 975 MG: 325 TABLET, FILM COATED ORAL at 21:00

## 2022-08-16 RX ADMIN — ENOXAPARIN SODIUM 40 MG: 40 INJECTION SUBCUTANEOUS at 21:00

## 2022-08-16 RX ADMIN — CEFUROXIME AXETIL 500 MG: 250 TABLET, FILM COATED ORAL at 19:27

## 2022-08-16 RX ADMIN — HYDROMORPHONE HYDROCHLORIDE 2 MG: 2 TABLET ORAL at 20:54

## 2022-08-16 RX ADMIN — KETOROLAC TROMETHAMINE 15 MG: 15 INJECTION, SOLUTION INTRAMUSCULAR; INTRAVENOUS at 00:02

## 2022-08-16 RX ADMIN — AMLODIPINE BESYLATE 2.5 MG: 2.5 TABLET ORAL at 09:08

## 2022-08-16 RX ADMIN — CEFUROXIME AXETIL 500 MG: 250 TABLET, FILM COATED ORAL at 09:12

## 2022-08-16 RX ADMIN — HYDROMORPHONE HYDROCHLORIDE 2 MG: 2 TABLET ORAL at 06:28

## 2022-08-16 ASSESSMENT — ACTIVITIES OF DAILY LIVING (ADL)
ADLS_ACUITY_SCORE: 24
ADLS_ACUITY_SCORE: 26

## 2022-08-16 NOTE — PROGRESS NOTES
08/16/22 1400   Quick Adds   Type of Visit Initial PT Evaluation   Living Environment   People in Home significant other   Current Living Arrangements house   Home Accessibility no concerns   Transportation Anticipated family or friend will provide   Self-Care   Usual Activity Tolerance fair   Current Activity Tolerance poor   Equipment Currently Used at Home walker, rolling;shower chair   Fall history within last six months yes   Number of times patient has fallen within last six months 1   General Information   Referring Physician Marlon   Patient/Family Therapy Goals Statement (PT) return home   Existing Precautions/Restrictions oxygen therapy device and L/min   Weight-Bearing Status - LLE full weight-bearing   Weight-Bearing Status - RLE full weight-bearing   Cognition   Affect/Mental Status (Cognition) WFL   Orientation Status (Cognition) oriented x 4   Follows Commands (Cognition) WFL   Pain Assessment   Patient Currently in Pain No   Integumentary/Edema   Integumentary/Edema no deficits were identifed   Posture    Posture Not impaired   Range of Motion (ROM)   Range of Motion ROM is WFL   Strength (Manual Muscle Testing)   Strength (Manual Muscle Testing) strength is WFL   Strength Comments however, patient fatigues with activity   Bed Mobility   Comment, (Bed Mobility) SBA   Transfers   Comment, (Transfers) SBA   Gait/Stairs (Locomotion)   [place] Level (Gait) supervision   Assistive Device (Gait)   (no AD)   Distance in Feet (Required for LE Total Joints) 20   Pattern (Gait) 2-point   Balance   Balance Comments good   Sensory Examination   Sensory Perception WFL   Coordination   Coordination no deficits were identified   Muscle Tone   Muscle Tone no deficits were identified   Clinical Impression   Criteria for Skilled Therapeutic Intervention Yes, treatment indicated   PT Diagnosis (PT) impaired mobility   Influenced by the following impairments SOB and fatigue   Functional limitations due to  impairments activity/gait tolerance   Clinical Presentation (PT Evaluation Complexity) Stable/Uncomplicated   Clinical Decision Making (Complexity) low complexity   Planned Therapy Interventions (PT) gait training   Anticipated Equipment Needs at Discharge (PT) walker, rolling   Risk & Benefits of therapy have been explained risks/benefits reviewed;patient   PT Discharge Planning   PT Discharge Recommendation (DC Rec) home with assist   PT Rationale for DC Rec anticipate patient to be near his baseline mobility at time of discharge   PT Brief overview of current status SBA to independent for all mobilities without use of assistive gait device; patient does become very SOB with activity and may benefit from use of rolling walker for energy conservation   Plan of Care Review   Plan of Care Reviewed With patient   Total Evaluation Time   Total Evaluation Time (Minutes) 15   Physical Therapy Goals   PT Frequency Daily   PT Predicted Duration/Target Date for Goal Attainment 08/22/22   PT Goals Gait   PT: Gait Supervision/stand-by assist;50 feet

## 2022-08-16 NOTE — PROGRESS NOTES
[facility]    Medicine Progress Note - Hospitalist Service    Date of Admission:  8/12/2022    Assessment & Plan            Syncope  Episode at home where he was unconscious for 10 to 15 seconds causing a fall and subsequent rib fracture  Etiology of syncope not clear, noted to be bradycardic in the ED  Not clear if pneumonia had been present prior leading to weakness and syncope/fall or if syncope was primary event  Has been on telemetry with some episodes of dropped beats, otherwise unremarkable  Echo today appear normal with normal EF and function  At discharge will order cardiac monitoring    Acute on chronic respiratory failure with hypoxia (H)  Uses oxygen at home at 2 L intermittently, currently at 5 L  May be due to underlying COPD exacerbation as well as rib fracture with reduced rib movement  5/16/2022-still on 4 liters NC, less short of air, moving in room more    Closed fracture of multiple ribs  Rib fractures 6 through 9 on the left side result of fall  Pain control, encourage deep cough and breathing    Pneumonia of left lower lobe due to infectious organism  Noted on imaging, currently on Rocephin and azithromycin  8/16/2022-clinically improved, will change over to oral meds    Bradycardia  Noted, having some episodes of dropped beats  Continue current monitoring  Magnesium low yesterday has been replaced    Stage 3 severe COPD by GOLD classification (H)  Increased respiratory distress at admission consider possible exacerbation of COPD  Currently on steroids with frequent scheduled neb treatments  Continue           Diet: Combination Diet Regular Diet Adult    DVT Prophylaxis: Enoxaparin (Lovenox) SQ  Burris Catheter: Not present  Central Lines: None  Cardiac Monitoring: None  Code Status: Full Code      Disposition Plan    likely home tomorrow     The patient's care was discussed with the Bedside Nurse and Patient.    Bryan Mason MD  Hospitalist Service  [facility]  "[facility]  Securely message with the Vocera Web Console (learn more here)  Text page via [employer] Paging/Directory         Clinically Significant Risk Factors Present on Admission               # Overweight: Estimated body mass index is 27.11 kg/m  as calculated from the following:    Height as of this encounter: 1.727 m (5' 8\").    Weight as of this encounter: 80.9 kg (178 lb 4.8 oz).        ______________________________________________________________________    Interval History   Getting quite tachy post albuterol, will try xopenex. Otherwise better. Pain is better controlled. Moving around room. Using 4 liter oxygen, base is 2-2.5    Data reviewed today: I reviewed all medications, new labs and imaging results over the last 24 hours. I personally reviewed no images or EKG's today.    Physical Exam   Vital Signs: Temp: 97.1  F (36.2  C) Temp src: Tympanic BP: (!) 158/74 Pulse: 51   Resp: 18 SpO2: 94 % O2 Device: Nasal cannula Oxygen Delivery: 4 LPM  Weight: 178 lbs 4.8 oz  Constitutional: awake, alert, cooperative, no apparent distress, and appears stated age  Respiratory: on 4 L, some decreased air movement, no wheeze  Cardiovascular: regular rate and rhythm  GI: normal bowel sounds, soft and non-distended    Data   Recent Labs   Lab 08/16/22  0702 08/15/22  2156 08/15/22  1134 08/15/22  0710 08/13/22  1134 08/13/22  0551 08/12/22  2244 08/12/22  1443   WBC  --   --   --   --   --  6.9  --  8.6   HGB  --   --   --   --   --  14.7  --  17.0   MCV  --   --   --   --   --  98  --  97   PLT  --   --   --   --   --  175  --  179   INR  --   --   --   --   --   --   --  1.02   NA  --   --   --   --   --  140  --  137   POTASSIUM 4.3  --   --   --   --  4.4  --  4.7   CHLORIDE  --   --   --   --   --  106  --  100   CO2  --   --   --   --   --  27  --  28   BUN  --   --   --   --   --  22  --  27*   CR  --   --   --   --   --  0.89  --  1.15   ANIONGAP  --   --   --   --   --  7  --  9   MARCY  --   --   --   --  "  --  8.7  --  10.3   GLC  --  88 76 86   < > 81   < > 108*   ALBUMIN  --   --   --   --   --   --   --  4.3   PROTTOTAL  --   --   --   --   --   --   --  7.2   BILITOTAL  --   --   --   --   --   --   --  0.9   ALKPHOS  --   --   --   --   --   --   --  66   ALT  --   --   --   --   --   --   --  21   AST  --   --   --   --   --   --   --  22    < > = values in this interval not displayed.

## 2022-08-16 NOTE — PROGRESS NOTES
"Patient told writer that his spouse increased his O2 from 4 L to 9 L while he showered. Spouse returned O2 to 4 L after shower when patient \"felt okay.\" Writer educated patient on importance of calling staff for assistance when oxygen needs to be adjusted. Patient is currently on 4 L. O2 sats 89-90%.  "

## 2022-08-16 NOTE — PROGRESS NOTES
Patient Stable throughout the shift Titrated down to 4L nc patient given morning treatment patient requested not to have Albuterol this morning .    RT Kendal on 8/16/2022 at 6:23 AM

## 2022-08-16 NOTE — PLAN OF CARE
"Patient using PRN dilaudid oral tablet, prefers over IV.  Oxygen saturation up to 98-99% when sleeping on 5LPM, down to 90 - 94% with movement and sitting up in bed.  Appeared to sleep soundly for blocks of time overnight.  BP elevated slightly.    BP (!) 144/71 (BP Location: Right arm, Patient Position: Semi-Galeano's, Cuff Size: Adult Regular)   Pulse 57   Temp 97.4  F (36.3  C) (Tympanic)   Resp 18   Ht 1.727 m (5' 8\")   Wt 80.9 kg (178 lb 6.4 oz)   SpO2 95%   BMI 27.13 kg/m        Goal Outcome Evaluation:    Plan of Care Reviewed With: patient     Overall Patient Progress: improving      Problem: Plan of Care - These are the overarching goals to be used throughout the patient stay.    Goal: Plan of Care Review/Shift Note  Description: The Plan of Care Review/Shift note should be completed every shift.  The Outcome Evaluation is a brief statement about your assessment that the patient is improving, declining, or no change.  This information will be displayed automatically on your shift note.  8/16/2022 0448 by Susan Tanner, RN  Outcome: Ongoing, Progressing  Flowsheets (Taken 8/16/2022 0448)  Plan of Care Reviewed With: patient  Overall Patient Progress: improving  8/16/2022 0448 by Susan Tanner RN  Outcome: Ongoing, Progressing  Flowsheets (Taken 8/16/2022 0448)  Plan of Care Reviewed With: patient  Overall Patient Progress: improving       Problem: Plan of Care - These are the overarching goals to be used throughout the patient stay.    Goal: Absence of Hospital-Acquired Illness or Injury  8/16/2022 0448 by Susan Tanner, RN  Outcome: Ongoing, Progressing  8/16/2022 0448 by Susan Tanner RN  Outcome: Ongoing, Progressing  Intervention: Identify and Manage Fall Risk  Recent Flowsheet Documentation  Taken 8/16/2022 0423 by Susan Tanner, RN  Safety Promotion/Fall Prevention: safety round/check completed  Taken 8/15/2022 2141 by Susan Tanner, RN  Safety Promotion/Fall Prevention: " safety round/check completed  Intervention: Prevent Skin Injury  Recent Flowsheet Documentation  Taken 8/16/2022 0423 by Susan Tanner, RN  Body Position: position changed independently  Taken 8/15/2022 2141 by Susan Tanner, RN  Body Position: position changed independently  Intervention: Prevent and Manage VTE (Venous Thromboembolism) Risk  Recent Flowsheet Documentation  Taken 8/16/2022 0423 by Susan Tanner, RN  Activity Management: activity adjusted per tolerance  Taken 8/15/2022 2141 by Susan Tanner, RN  Activity Management: activity adjusted per tolerance

## 2022-08-16 NOTE — PROGRESS NOTES
08/16/22 1358   Quick Adds   Type of Visit Initial Occupational Therapy Evaluation   Living Environment   People in Home significant other   Current Living Arrangements house   Home Accessibility no concerns   Transportation Anticipated family or friend will provide   Self-Care   Usual Activity Tolerance fair   Current Activity Tolerance poor   Equipment Currently Used at Home walker, rolling;shower chair   Cognitive Status Examination   Orientation Status orientation to person, place and time   Visual Perception   Visual Impairment/Limitations WFL   Pain Assessment   Patient Currently in Pain No   Range of Motion Comprehensive   General Range of Motion no range of motion deficits identified   Strength Comprehensive (MMT)   General Manual Muscle Testing (MMT) Assessment no strength deficits identified   Bed Mobility   Bed Mobility supine-sit   Supine-Sit [place] (Bed Mobility) supervision   Transfers   Transfers sit-stand transfer;bed-chair transfer   Transfer Skill: Bed to Chair/Chair to Bed   Bed-Chair K[place]man (Transfers) supervision   Sit-Stand Transfer   Sit-Stand Kaufman (Transfers) supervision   Clinical Impression   Criteria for Skilled Therapeutic Interventions Met (OT) Yes, treatment indicated   OT Diagnosis syncope, respiratory failure   Influenced by the following impairments SOB, decreased activity tolerance   OT Problem List-Impairments impacting ADL activity tolerance impaired   Assessment of Occupational Performance 1-3 Performance Deficits   Identified Performance Deficits mobility and self cares   Planned Therapy Interventions (OT) ADL retraining;progressive activity/exercise   Clinical Decision Making Complexity (OT) low complexity   Risk & Benefits of therapy have been explained risks/benefits reviewed   OT Discharge Planning   OT Discharge Recommendation (DC Rec) home with assist   OT Rationale for DC Rec Pt will likely have no further OT needs at time of D/C but will continue  to assess   OT Brief overview of current status Pt very pleasant, on 4 liters of O2 at time of eval and on 2 liters chronically, moving around room with SBA only but very SOB with all activity. Pt independently incorporates rest breaks as needed, will continue to follow.   Total Evaluation Time (Minutes)   Total Evaluation Time (Minutes) 15   OT Goals   Therapy Frequency (OT) Daily   OT Predicted Duration/Target Date for Goal Attainment 08/18/22   OT Goals Upper Body Dressing;Lower Body Dressing;Upper Body Bathing;Lower Body Bathing;Transfers;Toilet Transfer/Toileting   OT: Upper Body Dressing Supervision/stand-by assist   OT: Lower Body Dressing Supervision/stand-by assist   OT: Upper Body Bathing Supervision/stand-by assist   OT: Lower Body Bathing Supervision/stand-by assist   OT: Transfer Supervision/stand-by assist   OT: Toilet Transfer/Toileting Supervision/stand-by assist

## 2022-08-16 NOTE — PLAN OF CARE
Patient's O2 89-90% on 4 L via n/c. LS diminished throughout with expiratory wheezing. Productive cough. Dyspnea with exertion. C/o rib pain rating 5/10; controlled with medications. See MAR. Declined cold therapy. Independent in room. Voiding without difficulty.

## 2022-08-17 ENCOUNTER — HOSPITAL ENCOUNTER (OUTPATIENT)
Dept: CARDIOLOGY | Facility: OTHER | Age: 77
Discharge: HOME | DRG: 193 | End: 2022-08-17
Attending: FAMILY MEDICINE
Payer: COMMERCIAL

## 2022-08-17 ENCOUNTER — APPOINTMENT (OUTPATIENT)
Dept: PHYSICAL THERAPY | Facility: OTHER | Age: 77
DRG: 193 | End: 2022-08-17
Attending: FAMILY MEDICINE
Payer: COMMERCIAL

## 2022-08-17 ENCOUNTER — APPOINTMENT (OUTPATIENT)
Dept: OCCUPATIONAL THERAPY | Facility: OTHER | Age: 77
DRG: 193 | End: 2022-08-17
Attending: FAMILY MEDICINE
Payer: COMMERCIAL

## 2022-08-17 VITALS
BODY MASS INDEX: 26.55 KG/M2 | HEART RATE: 51 BPM | RESPIRATION RATE: 16 BRPM | HEIGHT: 68 IN | WEIGHT: 175.2 LBS | TEMPERATURE: 96.8 F | OXYGEN SATURATION: 94 % | DIASTOLIC BLOOD PRESSURE: 86 MMHG | SYSTOLIC BLOOD PRESSURE: 167 MMHG

## 2022-08-17 DIAGNOSIS — R55 SYNCOPE, UNSPECIFIED SYNCOPE TYPE: ICD-10-CM

## 2022-08-17 LAB
BACTERIA BLD CULT: NO GROWTH
BACTERIA BLD CULT: NO GROWTH
GLUCOSE BLDC GLUCOMTR-MCNC: 81 MG/DL (ref 70–99)
MAGNESIUM SERPL-MCNC: 2.1 MG/DL (ref 1.9–2.7)
POTASSIUM BLD-SCNC: 4.2 MMOL/L (ref 3.5–5.1)

## 2022-08-17 PROCEDURE — 250N000013 HC RX MED GY IP 250 OP 250 PS 637: Performed by: SURGERY

## 2022-08-17 PROCEDURE — 97530 THERAPEUTIC ACTIVITIES: CPT | Mod: GO | Performed by: OCCUPATIONAL THERAPIST

## 2022-08-17 PROCEDURE — 93246 EXT ECG>7D<15D RECORDING: CPT

## 2022-08-17 PROCEDURE — 93248 EXT ECG>7D<15D REV&INTERPJ: CPT | Performed by: INTERNAL MEDICINE

## 2022-08-17 PROCEDURE — 999N000157 HC STATISTIC RCP TIME EA 10 MIN

## 2022-08-17 PROCEDURE — 250N000013 HC RX MED GY IP 250 OP 250 PS 637: Performed by: FAMILY MEDICINE

## 2022-08-17 PROCEDURE — 250N000012 HC RX MED GY IP 250 OP 636 PS 637: Performed by: FAMILY MEDICINE

## 2022-08-17 PROCEDURE — 83735 ASSAY OF MAGNESIUM: CPT | Performed by: FAMILY MEDICINE

## 2022-08-17 PROCEDURE — 36415 COLL VENOUS BLD VENIPUNCTURE: CPT | Performed by: FAMILY MEDICINE

## 2022-08-17 PROCEDURE — 84132 ASSAY OF SERUM POTASSIUM: CPT | Performed by: FAMILY MEDICINE

## 2022-08-17 PROCEDURE — 99239 HOSP IP/OBS DSCHRG MGMT >30: CPT | Performed by: FAMILY MEDICINE

## 2022-08-17 PROCEDURE — 97116 GAIT TRAINING THERAPY: CPT | Mod: GP

## 2022-08-17 PROCEDURE — 94640 AIRWAY INHALATION TREATMENT: CPT

## 2022-08-17 PROCEDURE — 94640 AIRWAY INHALATION TREATMENT: CPT | Mod: 76

## 2022-08-17 RX ORDER — CEFUROXIME AXETIL 500 MG/1
500 TABLET ORAL EVERY 12 HOURS
Qty: 10 TABLET | Refills: 0 | Status: SHIPPED | OUTPATIENT
Start: 2022-08-17 | End: 2022-08-22

## 2022-08-17 RX ORDER — HYDROMORPHONE HYDROCHLORIDE 2 MG/1
2 TABLET ORAL EVERY 4 HOURS PRN
Qty: 15 TABLET | Refills: 0 | Status: SHIPPED | OUTPATIENT
Start: 2022-08-17 | End: 2022-08-25

## 2022-08-17 RX ORDER — PREDNISONE 20 MG/1
TABLET ORAL
Qty: 7 TABLET | Refills: 0 | Status: SHIPPED | OUTPATIENT
Start: 2022-08-17 | End: 2022-10-25

## 2022-08-17 RX ADMIN — HYDROMORPHONE HYDROCHLORIDE 2 MG: 2 TABLET ORAL at 03:31

## 2022-08-17 RX ADMIN — CEFUROXIME AXETIL 500 MG: 250 TABLET, FILM COATED ORAL at 08:31

## 2022-08-17 RX ADMIN — LISINOPRIL 20 MG: 20 TABLET ORAL at 10:17

## 2022-08-17 RX ADMIN — ACETAMINOPHEN 975 MG: 325 TABLET, FILM COATED ORAL at 03:31

## 2022-08-17 RX ADMIN — FLUTICASONE FUROATE AND VILANTEROL TRIFENATATE 1 PUFF: 100; 25 POWDER RESPIRATORY (INHALATION) at 06:17

## 2022-08-17 RX ADMIN — UMECLIDINIUM 1 PUFF: 62.5 AEROSOL, POWDER ORAL at 06:17

## 2022-08-17 RX ADMIN — PANTOPRAZOLE SODIUM 40 MG: 40 TABLET, DELAYED RELEASE ORAL at 07:57

## 2022-08-17 RX ADMIN — PREDNISONE 40 MG: 20 TABLET ORAL at 10:17

## 2022-08-17 RX ADMIN — ASPIRIN 81 MG: 81 TABLET, COATED ORAL at 10:17

## 2022-08-17 RX ADMIN — HYDROMORPHONE HYDROCHLORIDE 2 MG: 2 TABLET ORAL at 08:31

## 2022-08-17 RX ADMIN — ACETAMINOPHEN 975 MG: 325 TABLET, FILM COATED ORAL at 10:16

## 2022-08-17 RX ADMIN — AMLODIPINE BESYLATE 2.5 MG: 2.5 TABLET ORAL at 10:17

## 2022-08-17 RX ADMIN — POLYETHYLENE GLYCOL 3350 17 G: 17 POWDER, FOR SOLUTION ORAL at 10:16

## 2022-08-17 ASSESSMENT — CHADS2 SCORE: AGE GREATER THAN OR EQUAL TO 75: YES

## 2022-08-17 ASSESSMENT — ACTIVITIES OF DAILY LIVING (ADL)
ADLS_ACUITY_SCORE: 24
ADLS_ACUITY_SCORE: 24
ADLS_ACUITY_SCORE: 20
ADLS_ACUITY_SCORE: 24
ADLS_ACUITY_SCORE: 24

## 2022-08-17 NOTE — PROGRESS NOTES
08/17/22 0900   Signing Clinician's Name / Credentials   Signing clinician's name / credentials Latrice Costa DPT   Quick Adds   Rehab Discipline PT   Functional Transfer Training   Symptoms Noted During/After Treatment fatigue;shortness of breath   Treatment Detail pt on 4L at 92% in seated, CGA for sit to stand from recliner due to initial instability with standing. pain well controlled.   Gait Training   Symptoms Noted During/After Treatment (Gait Training) fatigue;shortness of breath   Distance in Feet (Required for LE Total Joints) 40   Treatment Detail ambulation in hallway for intial 20 feet on 4L and no AD with CGA and shuffling gait pattern and minor instability. pt demonstrating 82% oxygen on 4L during gait, increased to 8 L and seated rest break with rebounding oxygen to 92% in less than 1 min. 8L for 20 feet back to recliner and stayed at 92%, decreased to 4L at rest in chair and maintained oxygen above 90%.   [place] Level (Gait Training) contact guard   Physical Assistance Level (Gait Training) supervision   Weight Bearing (Gait Training) full weight-bearing   Pattern Analysis (Gait Training) 2-point gait   Gait Analysis Deviations decreased step length;increased time in double stance   Impairments (Gait Analysis/Training) balance impaired;pain;strength decreased   PT Discharge Planning   PT Discharge Recommendation (DC Rec) home with assist   PT Rationale for DC Rec anticipate patient to be near his baseline mobility at time of discharge   PT Brief overview of current status CGA without use of AD, 4L of oxygen at rest and required 8L during gait of 40 feet to maintain oxgyen above 90%. CGA for all mobility this date.   Additional Documentation   Rehab Comments pt is close to baseline function per pt report.   PT Plan continue PT   Total Session Time   Total Session Time (minutes) 15 minutes      16-Oct-2021 01:36

## 2022-08-17 NOTE — PATIENT INSTRUCTIONS
Date Placed on Pt:  08/17/2022    Patient instructed not to:   -take baths, swim, sauna   -remove device prior to 10 days   -use electric blankets   -shower or sweat excessively within first 24 hours of device application  Patient instructed to:   -shower as needed   -be carefull when toweling off and dressing   -press button when cardiac symptoms occur   -document symptoms in log book   -remove and return device (send via mail to [employer])   -Call [employer] with any questions     

## 2022-08-17 NOTE — PHARMACY - DISCHARGE MEDICATION RECONCILIATION AND EDUCATION
Pharmacy:  Discharge Counseling and Medication Reconciliation    Brent Villagomez  [address] [id number] [phone] (home)   77 year old male  PCP: Prince Proctor    Allergies: No clinical screening - see comments    Discharge Counseling:    Pharmacist met with patient today to review the medication portion of the After Visit Summary (with an emphasis on NEW medications) and to address patient's questions/concerns.    Summary of Education: Educated patient on new prescriptions for cefuroxime, hydromorphone, and prednisone. Covered how to take each of them, what they are for, and side effects to watch out for. Emphasized the importance of completing all of his antibiotic and that he can take it with a full glass of water and some food to help prevent stomach upset/diarrhea. Educated patient about his hydromorphone on how it is an oral narcotic, not to drive while taking it, it can cause constipation and he can take senna/docusate to help with constipation. Also discussed how it can make you feel tired, loopy, and dizzy.  Provided education on prednisone taper. Went through the directions with him and made sure he understood how to take it. Let him know to take it with food and covered common side effects such as upset stomach, restlessness, and trouble sleeping.     Patient seemed to understand all the information and stated he has taken these before.     Materials Provided:  [employer]sel[employer] sheets printed from Clinical Pharmacology on: hydromorphone, cefuroxime, and prednisone.     Discharge Medication Reconciliation:    It has been determined that the patient has an adequate supply of medications available or which can be obtained from the patient's preferred pharmacy, which he has confirmed as: [facility] [place].    Thank you for the consult.    BRAYAN HEARD........August 17, 2022 11:19 AM

## 2022-08-17 NOTE — PLAN OF CARE
Problem: Pain Acute  Goal: Acceptable Pain Control and Functional Ability  Outcome: Ongoing, Progressing  Intervention: Prevent or Manage Pain  Recent Flowsheet Documentation  Taken 8/16/2022 1928 by Rolan Wilson RN  Medication Review/Management:   medications reviewed   high-risk medications identified     Problem: Fluid Imbalance (Pneumonia)  Goal: Fluid Balance  Outcome: Ongoing, Progressing     Problem: Infection (Pneumonia)  Goal: Resolution of Infection Signs and Symptoms  Outcome: Ongoing, Progressing  Intervention: Prevent Infection Progression  Recent Flowsheet Documentation  Taken 8/16/2022 1928 by Rolan Wilson RN  Infection Management: aseptic technique maintained     Problem: Respiratory Compromise (Pneumonia)  Goal: Effective Oxygenation and Ventilation  Outcome: Ongoing, Progressing  Intervention: Promote Airway Secretion Clearance  Recent Flowsheet Documentation  Taken 8/16/2022 1928 by Rolan Wilson RN  Cough And Deep Breathing: done independently per patient  Intervention: Optimize Oxygenation and Ventilation  Recent Flowsheet Documentation  Taken 8/16/2022 1928 by Rolan Wilson RN  Head of Bed (HOB) Positioning: HOB at 30-45 degrees   Goal Outcome Evaluation:

## 2022-08-17 NOTE — PROGRESS NOTES
Patient slept most of the night requesting oral dilaudid occassionally overnight. No other complaints. Rolan Wilson RN on 8/17/2022 at 6:06 AM

## 2022-08-17 NOTE — PROGRESS NOTES
Patient remained stable throughout the night patient currently on 4L nc.  Patient given morning treatments as Scheduled.    Jon Joshi,  on 8/17/2022 at 6:20 AM

## 2022-08-17 NOTE — PROGRESS NOTES
08/17/22 0956   Signing Clinician's Name / Credentials   Signing clinician's name / credentials Mally Rondon OTR/L   Quick Adds   Rehab Discipline OT   Gait Training   Symptoms Noted During/After Treatment (Gait Training) fatigue;shortness of breath   Distance in Feet (Required for LE Total Joints) 40   Treatment Detail ambulation in hallway   [place] Level (Gait Training) contact guard   Physical Assistance Level (Gait Training) supervision   Weight Bearing (Gait Training) full weight-bearing   Therapeutic Activity   Symptoms Noted During/After Treatment Shortness of breath   Treatment Detail pt ambulated in room and hallway with 4 lpm at rest, sats dropped to 80% after 20 feet, increased to 8 lpm to maintain sats   OT Discharge Planning   OT Discharge Recommendation (DC Rec) home with assist   OT Rationale for DC Rec pt is at or close to baseline level of function for self cares; no further OT needs upon DC home   OT Brief overview of current status pt has poor activity tolerance; needs frequent rest breaks; CGA/SBA overall for basic self cares; mobility with CGA and no AD   Additional Documentation   OT Plan Continue OT   Total Session Time   Total Session Time (minutes) 15 minutes

## 2022-08-17 NOTE — PROGRESS NOTES
[facility] DISCHARGE NOTE    Patient discharged to home at 1:22 PM via wheel chair. Accompanied by spouse and staff. Discharge instructions reviewed with patient and spouse, opportunity offered to ask questions. Prescriptions sent to patients preferred pharmacy. All belongings sent with patient.    Shakira Kay RN

## 2022-08-17 NOTE — LETTER
September 6, 2022      Brent Villagomez  [address]  [place] [address]        Dear ,    We are writing to inform you of your test results.    Your heart monitor showed some early beats but no arrhythmia.  This is great news.  I am suspecting that your episode at home was due to you not wearing your oxygen.    Resulted Orders   Adult Leadless EKG Monitor 8 to 14 Days    Narrative    Procedure: Extended cardiac monitor.    Findings: The patient was monitored for 8 days and 3 hours.  Minimum sinus   heart rate 39, average sinus heart rate 64, maximum sinus heart rate 137.    There were 9 triggered patient events and 7 diary entries.  The majority   of these were associated with PVCs.  Predominant underlying rhythm was   sinus.  1 4 beat run of ventricular tachycardia occurred.  23 SVT runs   occurred, the longest lasting 20 beats with an average rate of 114.    Overall, there were 61,736 PVCs, 504 ventricular couplets and 26   ventricular triplets.    Impression: Zio patch monitor showing underlying sinus rhythm with   frequent ventricular ectopy including PVCs, couplets, triplets and one 4   beat run of ventricular tachycardia.  Ventricular ectopy appears to be   symptomatic based on patient events.  No other significant abnormalities.    Ihsan Galeano MD         If you have any questions or concerns, please call the clinic at the number listed above.       Sincerely,      Prince Proctor MD on 9/6/2022 at 8:09 AM

## 2022-08-18 ENCOUNTER — PATIENT OUTREACH (OUTPATIENT)
Dept: CARE COORDINATION | Facility: CLINIC | Age: 77
End: 2022-08-18

## 2022-08-18 DIAGNOSIS — Z79.899 OTHER LONG TERM (CURRENT) DRUG THERAPY: ICD-10-CM

## 2022-08-18 DIAGNOSIS — S22.49XD CLOSED FRACTURE OF MULTIPLE RIBS WITH ROUTINE HEALING, UNSPECIFIED LATERALITY, SUBSEQUENT ENCOUNTER: Primary | ICD-10-CM

## 2022-08-18 RX ORDER — MAGNESIUM OXIDE 400 MG/1
400 TABLET ORAL DAILY
COMMUNITY
End: 2022-10-25

## 2022-08-18 RX ORDER — HYDROMORPHONE HYDROCHLORIDE 2 MG/1
2 TABLET ORAL EVERY 4 HOURS PRN
Refills: 0 | Status: CANCELLED
Start: 2022-08-18

## 2022-08-18 RX ORDER — OXYCODONE AND ACETAMINOPHEN 5; 325 MG/1; MG/1
1 TABLET ORAL EVERY 6 HOURS PRN
Qty: 12 TABLET | Refills: 0 | Status: SHIPPED | OUTPATIENT
Start: 2022-08-18 | End: 2022-08-21

## 2022-08-18 RX ORDER — AMLODIPINE BESYLATE 5 MG/1
2.5 TABLET ORAL
COMMUNITY
Start: 2022-02-15 | End: 2022-08-18

## 2022-08-18 RX ORDER — LORAZEPAM 1 MG/1
1 TABLET ORAL
COMMUNITY
Start: 2022-08-05 | End: 2022-08-18

## 2022-08-18 RX ORDER — MONTELUKAST SODIUM 10 MG/1
10 TABLET ORAL
COMMUNITY
Start: 2022-07-06 | End: 2022-10-25

## 2022-08-18 RX ORDER — NAPROXEN 500 MG/1
500 TABLET ORAL 2 TIMES DAILY WITH MEALS
Qty: 30 TABLET | Refills: 1 | Status: SHIPPED | OUTPATIENT
Start: 2022-08-18 | End: 2022-09-02

## 2022-08-18 NOTE — TELEPHONE ENCOUNTER
Called patient to update on O2 supply delivery. Patient's caregiver states that patient is napping and that he took a pain medication so he is not struggling like he was earlier today. Advised that nursing staff will call with updated guidance once received from provider regarding need to be seen and medication refill.   Swathi Cox RN on 8/18/2022 at 3:22 PM

## 2022-08-18 NOTE — TELEPHONE ENCOUNTER
Transitional Care Management Phone Call    Summary of hospitalization:  [facility] discharge summary reviewed  DISCHARGE DIAGNOSIS:   Principal Problem:    Syncope  Active Problems:    Acute on chronic respiratory failure with hypoxia (H)    Closed fracture of multiple ribs    Pneumonia of left lower lobe due to infectious organism    Stage 3 severe COPD by GOLD classification (H)    Bradycardia     DATE OF DISCHARGE: 8/17/22    Patient experiencing increased pain and caregiver states that patient status is worsening. Receiving O2 at 4 LPM but in need of an O2 extender as patient cannot ambulate without extended O2 tubing supplies. Patient unable to contact O2 supplier, [employer] with [facility]. Nurse unable to contact medical [employer], as this medical supply company is not answering the telephone. Patient's O2 Sats in the 80's when moves or gets up to walk and average 92 when at rest.     Educated patient on medication use, due to increased pain, request for refill of pain medication.     Diagnostic Tests/Treatments reviewed.  Follow up needed: EKG 8-14 days    Post Discharge Medication Reconciliation: discharge medications reconciled and changed, per note/orders.  Medications reviewed by: by myself    Problems taking medications regularly:  None  Problems adhering to non-medication therapy:  None    Other Healthcare Providers Involved in Patient s Care:         02 supplier  Update since discharge: worsened.   Plan of care communicated with patient and caregiver  Just a friendly reminder that you appointment is   Next 5 appointments (look out 90 days)    Aug 29, 2022 11:00 AM  Office Visit with Prince Proctor MD  [facility] ([facility] ) [address]  [place] [id number] [phone]      .  We encourage you to keep this appointment.  Please remember to bring all of your pills in their bottles  (including any vitamins or over the counter pills) with you to your appointment.   The patient indicates understanding of these issues and agrees with the plan of care.   Yes    Was the patient contacted within the 2 business days or other approved timeframe?  Yes  Was the Medication reconciliation and management done since the patient was discharged? Yes    Swathi Cox RN  8/18/2022 12:42 PM

## 2022-08-18 NOTE — TELEPHONE ENCOUNTER
Patient's records reviewed and discussed with nursing staff.  Recommend the patient be seen sooner than 8/29 due to significant pain and breathing issues.  Please schedule with PCP on 8/25 using the same-day appointment.      In the meantime I will send in an anti-inflammatory medication that can be taken twice daily.  This is typically the best medication for rib fractures.  I will also send in a few additional pain medications in the form of Percocet.  He is to use these sparingly and try to taper off the pain medications by the time he sees PCP.  He should not use the pain medications and the lorazepam at the same time.     If he has worsening breathing issues, new concerning symptoms or intractable pain it is recommended he come in for further evaluation through the ER.

## 2022-08-18 NOTE — TELEPHONE ENCOUNTER
Called patient to review Dr. Johnson's recommendations. Appointment changed to 8/25/22 at 0900; Naproxen and Percocet will be picked up at [facility]. Patient will finish Dilaudid prior to starting Percocet and will not taking Ativan while using narcotic pain medication. Patient plans to contact  Medical supply as they would like to obtain a local O2 supplier. Patient understands and agrees to plan. Patient will go to the ED if he is having uncontrollable pain or difficulty breathing.  Swathi Cox RN on 8/18/2022 at 4:35 PM

## 2022-08-18 NOTE — TELEPHONE ENCOUNTER
Called [employer] to coordinate delivery of O2 tubing and extender to patient. Informed that patient must receive O2 supplies from original O2 supplier, [facility] in [place], [place]. April at  [facility] [place] contacted [facility] supplier as the RN and patient have be unsuccessful in reaching company as [facility] is not answering telephone. [facility] telephone number changed from original number patient was given as companies merged recently. [facility] stated that they will contact patient to organize supply delivery.     Swathi Cox RN on 8/18/2022 at 2:55 PM

## 2022-08-18 NOTE — TELEPHONE ENCOUNTER
Please advise as PCP out until 8/25/22.   Patient follow up TCM call, patient experiencing worsening of symptoms and increased pain, requesting refill of Dilaudid. Patient does not have adequate O2 supplies and O2 supplier [facility] is not answering their telephone to arrange for supplies to be delivered to patient. Please order O2 tubing and extender supplies locally so patient is able to ambulate at home. Please advise if you would like patient to be seen within the clinic or ED due to worsening symptoms and lack of proper O2 supplies. Currently using O2 at 4 LPM.   Thank you,   Swathi Cox RN on 8/18/2022 at 1:14 PM

## 2022-08-19 ENCOUNTER — NURSE TRIAGE (OUTPATIENT)
Dept: INTERNAL MEDICINE | Facility: OTHER | Age: 77
End: 2022-08-19

## 2022-08-19 ENCOUNTER — HOSPITAL ENCOUNTER (EMERGENCY)
Facility: OTHER | Age: 77
Discharge: HOME | End: 2022-08-19
Attending: STUDENT IN AN ORGANIZED HEALTH CARE EDUCATION/TRAINING PROGRAM | Admitting: STUDENT IN AN ORGANIZED HEALTH CARE EDUCATION/TRAINING PROGRAM
Payer: COMMERCIAL

## 2022-08-19 ENCOUNTER — APPOINTMENT (OUTPATIENT)
Dept: GENERAL RADIOLOGY | Facility: OTHER | Age: 77
End: 2022-08-19
Attending: STUDENT IN AN ORGANIZED HEALTH CARE EDUCATION/TRAINING PROGRAM
Payer: COMMERCIAL

## 2022-08-19 VITALS
BODY MASS INDEX: 26.61 KG/M2 | HEART RATE: 78 BPM | DIASTOLIC BLOOD PRESSURE: 65 MMHG | OXYGEN SATURATION: 94 % | RESPIRATION RATE: 20 BRPM | WEIGHT: 175 LBS | TEMPERATURE: 98 F | SYSTOLIC BLOOD PRESSURE: 130 MMHG

## 2022-08-19 DIAGNOSIS — R06.02 SHORTNESS OF BREATH: ICD-10-CM

## 2022-08-19 LAB
ANION GAP SERPL CALCULATED.3IONS-SCNC: 10 MMOL/L (ref 3–14)
BASE EXCESS BLDA CALC-SCNC: 2.2 MMOL/L (ref -9–1.8)
BASOPHILS # BLD AUTO: 0 10E3/UL (ref 0–0.2)
BASOPHILS NFR BLD AUTO: 0 %
BUN SERPL-MCNC: 25 MG/DL (ref 7–25)
CALCIUM SERPL-MCNC: 9.7 MG/DL (ref 8.6–10.3)
CHLORIDE BLD-SCNC: 103 MMOL/L (ref 98–107)
CO2 SERPL-SCNC: 25 MMOL/L (ref 21–31)
CREAT SERPL-MCNC: 0.79 MG/DL (ref 0.7–1.3)
EOSINOPHIL # BLD AUTO: 0 10E3/UL (ref 0–0.7)
EOSINOPHIL NFR BLD AUTO: 0 %
ERYTHROCYTE [DISTWIDTH] IN BLOOD BY AUTOMATED COUNT: 12.2 % (ref 10–15)
GFR SERPL CREATININE-BSD FRML MDRD: >90 ML/MIN/1.73M2
GLUCOSE BLD-MCNC: 138 MG/DL (ref 70–105)
HCO3 BLD-SCNC: 26 MMOL/L (ref 21–28)
HCT VFR BLD AUTO: 44 % (ref 40–53)
HGB BLD-MCNC: 15.8 G/DL (ref 13.3–17.7)
IMM GRANULOCYTES # BLD: 0.1 10E3/UL
IMM GRANULOCYTES NFR BLD: 1 %
LYMPHOCYTES # BLD AUTO: 0.7 10E3/UL (ref 0.8–5.3)
LYMPHOCYTES NFR BLD AUTO: 6 %
MCH RBC QN AUTO: 34.2 PG (ref 26.5–33)
MCHC RBC AUTO-ENTMCNC: 35.9 G/DL (ref 31.5–36.5)
MCV RBC AUTO: 95 FL (ref 78–100)
MONOCYTES # BLD AUTO: 0.4 10E3/UL (ref 0–1.3)
MONOCYTES NFR BLD AUTO: 4 %
NEUTROPHILS # BLD AUTO: 8.9 10E3/UL (ref 1.6–8.3)
NEUTROPHILS NFR BLD AUTO: 89 %
NRBC # BLD AUTO: 0 10E3/UL
NRBC BLD AUTO-RTO: 0 /100
O2/TOTAL GAS SETTING VFR VENT: 32 %
OXYHGB MFR BLD: 90 % (ref 92–100)
PCO2 BLD: 38 MM HG (ref 35–45)
PH BLD: 7.45 [PH] (ref 7.35–7.45)
PLATELET # BLD AUTO: 253 10E3/UL (ref 150–450)
PO2 BLD: 57 MM HG (ref 80–105)
POTASSIUM BLD-SCNC: 4.2 MMOL/L (ref 3.5–5.1)
PROCALCITONIN SERPL-MCNC: <0.5 NG/ML
RBC # BLD AUTO: 4.62 10E6/UL (ref 4.4–5.9)
SODIUM SERPL-SCNC: 138 MMOL/L (ref 134–144)
WBC # BLD AUTO: 10.1 10E3/UL (ref 4–11)

## 2022-08-19 PROCEDURE — 85025 COMPLETE CBC W/AUTO DIFF WBC: CPT | Performed by: STUDENT IN AN ORGANIZED HEALTH CARE EDUCATION/TRAINING PROGRAM

## 2022-08-19 PROCEDURE — 71045 X-RAY EXAM CHEST 1 VIEW: CPT

## 2022-08-19 PROCEDURE — 250N000011 HC RX IP 250 OP 636: Performed by: STUDENT IN AN ORGANIZED HEALTH CARE EDUCATION/TRAINING PROGRAM

## 2022-08-19 PROCEDURE — 82310 ASSAY OF CALCIUM: CPT | Performed by: STUDENT IN AN ORGANIZED HEALTH CARE EDUCATION/TRAINING PROGRAM

## 2022-08-19 PROCEDURE — 250N000009 HC RX 250: Performed by: STUDENT IN AN ORGANIZED HEALTH CARE EDUCATION/TRAINING PROGRAM

## 2022-08-19 PROCEDURE — 99284 EMERGENCY DEPT VISIT MOD MDM: CPT | Performed by: STUDENT IN AN ORGANIZED HEALTH CARE EDUCATION/TRAINING PROGRAM

## 2022-08-19 PROCEDURE — 94640 AIRWAY INHALATION TREATMENT: CPT

## 2022-08-19 PROCEDURE — 999N000157 HC STATISTIC RCP TIME EA 10 MIN

## 2022-08-19 PROCEDURE — 82805 BLOOD GASES W/O2 SATURATION: CPT | Performed by: STUDENT IN AN ORGANIZED HEALTH CARE EDUCATION/TRAINING PROGRAM

## 2022-08-19 PROCEDURE — 99285 EMERGENCY DEPT VISIT HI MDM: CPT | Mod: 25 | Performed by: STUDENT IN AN ORGANIZED HEALTH CARE EDUCATION/TRAINING PROGRAM

## 2022-08-19 PROCEDURE — 94640 AIRWAY INHALATION TREATMENT: CPT | Mod: 76

## 2022-08-19 PROCEDURE — 84145 PROCALCITONIN (PCT): CPT | Performed by: STUDENT IN AN ORGANIZED HEALTH CARE EDUCATION/TRAINING PROGRAM

## 2022-08-19 PROCEDURE — 96374 THER/PROPH/DIAG INJ IV PUSH: CPT | Performed by: STUDENT IN AN ORGANIZED HEALTH CARE EDUCATION/TRAINING PROGRAM

## 2022-08-19 PROCEDURE — 36600 WITHDRAWAL OF ARTERIAL BLOOD: CPT | Performed by: STUDENT IN AN ORGANIZED HEALTH CARE EDUCATION/TRAINING PROGRAM

## 2022-08-19 RX ORDER — IPRATROPIUM BROMIDE AND ALBUTEROL SULFATE 2.5; .5 MG/3ML; MG/3ML
1 SOLUTION RESPIRATORY (INHALATION) EVERY 6 HOURS PRN
Qty: 90 ML | Refills: 0 | Status: SHIPPED | OUTPATIENT
Start: 2022-08-19 | End: 2022-08-25

## 2022-08-19 RX ORDER — IPRATROPIUM BROMIDE AND ALBUTEROL SULFATE 2.5; .5 MG/3ML; MG/3ML
3 SOLUTION RESPIRATORY (INHALATION) ONCE
Status: COMPLETED | OUTPATIENT
Start: 2022-08-19 | End: 2022-08-19

## 2022-08-19 RX ORDER — METHYLPREDNISOLONE SODIUM SUCCINATE 125 MG/2ML
125 INJECTION, POWDER, LYOPHILIZED, FOR SOLUTION INTRAMUSCULAR; INTRAVENOUS ONCE
Status: COMPLETED | OUTPATIENT
Start: 2022-08-19 | End: 2022-08-19

## 2022-08-19 RX ADMIN — METHYLPREDNISOLONE SODIUM SUCCINATE 125 MG: 125 INJECTION, POWDER, FOR SOLUTION INTRAMUSCULAR; INTRAVENOUS at 14:25

## 2022-08-19 RX ADMIN — IPRATROPIUM BROMIDE AND ALBUTEROL SULFATE 3 ML: 2.5; .5 SOLUTION RESPIRATORY (INHALATION) at 14:19

## 2022-08-19 RX ADMIN — IPRATROPIUM BROMIDE AND ALBUTEROL SULFATE 3 ML: 2.5; .5 SOLUTION RESPIRATORY (INHALATION) at 15:44

## 2022-08-19 ASSESSMENT — ACTIVITIES OF DAILY LIVING (ADL)
ADLS_ACUITY_SCORE: 35
ADLS_ACUITY_SCORE: 35

## 2022-08-19 NOTE — ED PROVIDER NOTES
History     Chief Complaint   Patient presents with     Shortness of Breath       Brent Villagomez is a 77 year old male presenting for dyspnea. Onset was last evening when he ran out of his home oxygen.  SPO2 noted to be down to 80%.  He also had subjective fever and felt back to where he was prior to presentation recently for pneumonia.  Is currently on antibiotics and steroid taper.  He has been taking his home respiratory medications as prescribed.  Cough is productive.  Denies any new pain, nausea, vomiting, diarrhea.  He does have left-sided axillary pain secondary to rib fractures.  Chronically on 2 L of home oxygen.    Allergies   Allergen Reactions     No Clinical Screening - See Comments      Dust and pollen       Patient Active Problem List    Diagnosis Date Noted     Closed fracture of multiple ribs 08/12/2022     Priority: Medium     Syncope 08/12/2022     Priority: Medium     Bradycardia 08/12/2022     Priority: Medium     Abdominal wall pain in left upper quadrant 08/12/2022     Priority: Medium     Chest wall contusion, left, initial encounter 08/12/2022     Priority: Medium     Pneumonia of left lower lobe due to infectious organism 08/12/2022     Priority: Medium     Alcohol dependence in remission (H) 09/23/2021     Priority: Medium     Pain medication agreement-nonopioid 9-23-21 09/23/2021     Priority: Medium     Other long term (current) drug therapy 09/23/2021     Priority: Medium     Acute on chronic respiratory failure with hypoxia (H) 09/12/2021     Priority: Medium     Tachypnea 09/11/2021     Priority: Medium     COPD exacerbation (H) 09/11/2021     Priority: Medium     Community acquired pneumonia 09/11/2021     Priority: Medium     Health care directive on file 12/02/2020     Priority: Medium     Benign prostatic hyperplasia with lower urinary tract symptoms, symptom details unspecified 08/06/2019     Priority: Medium     Multiple pulmonary nodules 06/20/2018     Priority: Medium      COPD with emphysema (H) 2015     Priority: Medium     PCT (porphyria cutanea tarda) (H) 2012     Priority: Medium     Formatting of this note might be different from the original.  2000 dx Dr Hernandez felt due to Hepatitis C liver disease; treated with iron unloading phlebotomies       Colon polyps 2012     Priority: Medium     Formatting of this note might be different from the original.  Tubular adenomas cecum and descending colon in .  2008 colonoscopy and 4 polyps removed by Dr Bob Raya at [facility] in [place], [place] - path = tubular adenomas.  Colonoscopy 2013 Dr Bonds - 2 TAs.       Family history of abdominal aortic aneurysm 2012     Priority: Medium     Formatting of this note might be different from the original.  Brother  age 64.  Abd aorta screening 2012 normal.       Positive PPD, treated 2012     Priority: Medium     Formatting of this note might be different from the original.  9 [facility] in his early 30s.       Stage 3 severe COPD by GOLD classification (H) 2012     Priority: Medium     Obesity 09/15/2009     Priority: Medium     Lichenification 2008     Priority: Medium     Benign neoplasm of colon 2008     Priority: Medium     Polycythemia, secondary 2008     Priority: Medium     Major depressive disorder, recurrent episode, moderate (H) 2007     Priority: Medium     Osteoarthritis, hand 2006     Priority: Medium     Tobacco user 05/10/2006     Priority: Medium     Degeneration of lumbar or lumbosacral intervertebral disc 2006     Priority: Medium     Actinic keratosis 2005     Priority: Medium     Obstructive chronic bronchitis without exacerbation (H) 2003     Priority: Medium     Disorder of porphyrin metabolism (H) 2003     Priority: Medium     Acute hepatitis C virus infection 2000     Priority: Medium     Hypertension  2000     Priority: Medium       Past Medical History:   Diagnosis Date     Chronic hepatitis C (H)      Disorder of porphyrin metabolism (H)      Lumbar sprain      Other and unspecified alcohol dependence, unspecified drinking behavior      Other specified chronic obstructive airways disease      Personal history of other specified diseases      Polycythemia, secondary      Unspecified visual loss        Past Surgical History:   Procedure Laterality Date     CLOSED REDUCTION ANKLE      Fx ankle with plate       Family History   Problem Relation Age of Onset     Heart Disease Mother         Heart Disease,valve disease/rheumatic     Other - See Comments Father         sudden death age 70     Arthritis Father         Arthritis     Cancer Brother         Cancer,Skin cancer       Social History     Tobacco Use     Smoking status: Former Smoker     Packs/day: 1.00     Types: Cigarettes     Quit date: 2016     Years since quittin.9     Smokeless tobacco: Never Used   Vaping Use     Vaping Use: Never used   Substance Use Topics     Alcohol use: No     Drug use: Never       Medications:    albuterol (PROAIR HFA/PROVENTIL HFA/VENTOLIN HFA) 108 (90 Base) MCG/ACT inhaler  albuterol (PROVENTIL) (2.5 MG/3ML) 0.083% neb solution  amLODIPine (NORVASC) 5 MG tablet  aspirin EC 81 MG EC tablet  cefuroxime (CEFTIN) 500 MG tablet  cholecalciferol (VITAMIN D3) 25 mcg (1000 units) capsule  Fluticasone-Umeclidin-Vilanterol (TRELEGY ELLIPTA) 100-62.5-25 MCG/INH oral inhaler  HYDROmorphone (DILAUDID) 2 MG tablet  lisinopril-hydrochlorothiazide (ZESTORETIC) 20-12.5 MG tablet  loratadine (CLARITIN) 10 MG tablet  LORazepam (ATIVAN) 1 MG tablet  magnesium oxide (MAG-OX) 400 MG tablet  montelukast (SINGULAIR) 10 MG tablet  montelukast (SINGULAIR) 10 MG tablet  multivitamin, therapeutic (THERA-VIT) TABS tablet  naproxen (NAPROSYN) 500 MG tablet  nicotine (COMMIT) 2 MG lozenge  oxyCODONE-acetaminophen (PERCOCET) 5-325 MG  tablet  predniSONE (DELTASONE) 20 MG tablet  Respiratory Therapy Supplies (INNOSPIRE REPLACEMENT FILTER) [facility]  Respiratory Therapy Supplies (NEBULIZER/TUBING/MOUTHPIECE) KIT  sodium chloride (PF) 0.9% PF flush  tamsulosin (FLOMAX) 0.4 MG capsule        Review of Systems: See HPI for pertinent negatives and positives. All other systems reviewed and found to be negative.    Physical Exam   /68   Pulse 80   Temp 98  F (36.7  C)   Resp 26   Wt 79.4 kg (175 lb)   SpO2 91%   BMI 26.61 kg/m       General: awake, comfortable  HEENT: atraumatic, 3 L supplemental oxygen  Respiratory: normal effort, poor air movement throughout, prolonged expiratory phase with diffuse wheeze  Cardiovascular: regular rate and rhythm, no murmurs, extremities appear well perfused  Abdomen: soft, nontender, nondistended  Extremities: no deformities, edema, or tenderness  Skin: warm, dry, no rashes  Neuro: alert, no focal deficits    ED Course      ED Course as of 08/19/22 1623   Fri Aug 19, 2022   1506 Mild improvement of aeration. Repeating duoneb.   1617 Wheeze completely resolved status post second DuoNeb.       Results for orders placed or performed during the hospital encounter of 08/19/22 (from the past 24 hour(s))   CBC with platelets differential    Narrative    The following orders were created for panel order CBC with platelets differential.  Procedure                               Abnormality         Status                     ---------                               -----------         ------                     CBC with platelets and d...[[id number]]  Abnormal            Final result                 Please view results for these tests on the individual orders.   Basic metabolic panel   Result Value Ref Range    Sodium 138 134 - 144 mmol/L    Potassium 4.2 3.5 - 5.1 mmol/L    Chloride 103 98 - 107 mmol/L    Carbon Dioxide (CO2) 25 21 - 31 mmol/L    Anion Gap 10 3 - 14 mmol/L    Urea Nitrogen 25 7 - 25 mg/dL    Creatinine 0.79  0.70 - 1.30 mg/dL    Calcium 9.7 8.6 - 10.3 mg/dL    Glucose 138 (H) 70 - 105 mg/dL    GFR Estimate >90 >60 mL/min/1.73m2   Procalcitonin   Result Value Ref Range    Procalcitonin <0.50 <0.50 ng/mL ng/mL   Blood gas arterial and oxyhgb   Result Value Ref Range    pH Arterial 7.45 7.35 - 7.45    pCO2 Arterial 38 35 - 45 mm Hg    pO2 Arterial 57 (L) 80 - 105 mm Hg    Bicarbonate Arterial 26 21 - 28 mmol/L    Oxyhemoglobin Arterial 90 (L) 92 - 100 %    Base Excess/Deficit (+/-) 2.2 (H) -9.0 - 1.8 mmol/L    FIO2 32    CBC with platelets and differential   Result Value Ref Range    WBC Count 10.1 4.0 - 11.0 10e3/uL    RBC Count 4.62 4.40 - 5.90 10e6/uL    Hemoglobin 15.8 13.3 - 17.7 g/dL    Hematocrit 44.0 40.0 - 53.0 %    MCV 95 78 - 100 fL    MCH 34.2 (H) 26.5 - 33.0 pg    MCHC 35.9 31.5 - 36.5 g/dL    RDW 12.2 10.0 - 15.0 %    Platelet Count 253 150 - 450 10e3/uL    % Neutrophils 89 %    % Lymphocytes 6 %    % Monocytes 4 %    % Eosinophils 0 %    % Basophils 0 %    % Immature Granulocytes 1 %    NRBCs per 100 WBC 0 <1 /100    Absolute Neutrophils 8.9 (H) 1.6 - 8.3 10e3/uL    Absolute Lymphocytes 0.7 (L) 0.8 - 5.3 10e3/uL    Absolute Monocytes 0.4 0.0 - 1.3 10e3/uL    Absolute Eosinophils 0.0 0.0 - 0.7 10e3/uL    Absolute Basophils 0.0 0.0 - 0.2 10e3/uL    Absolute Immature Granulocytes 0.1 <=0.4 10e3/uL    Absolute NRBCs 0.0 10e3/uL   XR Chest Port 1 View    Narrative    PROCEDURE: XR CHEST PORT 1 VIEW 8/19/2022 2:25 PM    HISTORY: shortness of breath, current PNA treatment    COMPARISONS: 8/12/2022.    TECHNIQUE: Portable AP views.    FINDINGS: Heart is stable in size. Right lung and pleural space are  clear.    There is a small left-sided pleural effusion with adjacent atelectasis  or infiltrate, slightly worse than on the prior exam.         Impression    IMPRESSION: Slight worsening in the appearance of the left lung base.    TOÑO ALVAREZ MD         SYSTEM ID:  [id number]       Medications   ipratropium -  albuterol 0.5 mg/2.5 mg/3 mL (DUONEB) neb solution 3 mL (3 mLs Nebulization Given 8/19/22 1419)   methylPREDNISolone sodium succinate (solu-MEDROL) injection 125 mg (125 mg Intravenous Given 8/19/22 1425)   ipratropium - albuterol 0.5 mg/2.5 mg/3 mL (DUONEB) neb solution 3 mL (3 mLs Nebulization Given 8/19/22 4314)       Assessments & Plan (with Medical Decision Making)     I have reviewed the nursing notes.    77 year old male evaluated for dyspnea in context of recent admission for pneumonia and currently on antibiotic and steroid taper.  ABG demonstrating hypoxia.  Exam with diffuse expiratory wheeze and prolonged expiratory phase and poor air movement.  Patient treated per above with significant improvement with resolution of his wheeze.  He felt back to approximate recent normal baseline and felt appropriate for outpatient management after discussion regarding admission versus discharge.  He has ample oxygen at home and is at his baseline oxygen requirement.  He has close outpatient PCP follow-up already scheduled. Discharged home with ED return precautions.    I have reviewed the findings, diagnosis, plan and need for follow up with the patient.    Patient instructions:   Complete antibiotics and steroids you are currently taking.  Follow-up with Dr. Proctor as already scheduled.    Continue to take your scheduled DuoNeb's and all others prescribed breathing treatments.    Return to the emergency department if you have significant worsening of your shortness of breath or chest pain or chest tightness.    New Prescriptions    No medications on file       Final diagnoses:   Shortness of breath       8/19/2022   [facility]       Nakul Clements MD  08/19/22 6775

## 2022-08-19 NOTE — ED TRIAGE NOTES
Pt is here with increasing SOB.  He was just discharged on Wednesday.  He was hospitalized for pneumonia and left sided rib fx's.  Is currently on 4L of oxygen.  Prior to his hospitalization, he was only on 2 L.  Pt is having dyspnea while at rest.    Margaret May RN on 8/19/2022 at 1:30 PM       Triage Assessment     Row Name 08/19/22 1328       Triage Assessment (Adult)    Airway WDL airway symptoms       Respiratory WDL    Respiratory WDL rhythm/pattern    Rhythm/Pattern, Respiratory breathlessness  Dyspnea at rest       Cardiac WDL    Cardiac WDL WDL       Peripheral/Neurovascular WDL    Peripheral Neurovascular WDL X;capillary refill    Capillary Refill, General greater than 3 secs       Cognitive/Neuro/Behavioral WDL    Cognitive/Neuro/Behavioral WDL WDL       Kb Coma Scale    Best Eye Response 4-->(E4) spontaneous    Best Motor Response 6-->(M6) obeys commands    Best Verbal Response 5-->(V5) oriented    [place] Coma Scale Score 15

## 2022-08-19 NOTE — TELEPHONE ENCOUNTER
"S-(situation): low oxygen saturation    B-(background): Patient was discharged from the hospital on 8/17/22. He was in the hospital for syncope, acute on chronic respiratory failure with hypoxia, and pneumonia.     A-(assessment): Patient's wife states that his oxygen saturation has been hanging in the low 80's and occasionally gets up to 90%. He is currently on continuous home oxygen at 4 L via NC. He has some mild shortness of breath. His pulse has been fluctuating as well. He has had some dizziness and a fever that comes and goes. His sputum has been rust colored and blood tinged at times. These symptoms started last night.     R-(recommendations): Per protocol patient should be further evaluated in the ED due to his oxygen levels dropping and not going above 90%. They verbalized their understanding and agreed to plan.     George Mancini RN, BSN  ....................  8/19/2022   11:59 AM        Reason for Disposition    Oxygen level (e.g., pulse oximetry) 90 percent or lower    Additional Information    Negative: SEVERE difficulty breathing (e.g., struggling for each breath, speaks in single words, pulse > 120)    Negative: Bluish (or gray) lips or face now    Negative: Difficult to awaken or acting confused (e.g., disoriented, slurred speech)    Negative: Slow, shallow and weak breathing    Negative: Sounds like a life-threatening emergency to the triager    Negative: Breathing difficulty is main concern    Negative: [1] Wheezing (high pitched whistling sound) is main concern AND [2] previous asthma attacks or use of asthma medicines    Negative: Diagnosed with Chronic Pulmonary Obstructive Disease (COPD) and on oxygen therapy    Answer Assessment - Initial Assessment Questions  1. MAIN CONCERN OR SYMPTOM: \"What's your main concern?\" (e.g., low oxygen level, breathing difficulty) \"What question do you have?\"      Shortness of breath, low oxygen level, dizziness    2. ONSET: \"When did the  symptoms  start?\"     " "  Last [facility]    3. OXYGEN THERAPY:     - \"Do you currently use home oxygen?\"     - If Yes, ask: \"What is your oxygen source?\" (e.g., O2 tank, O2 concentrator).     - If Yes, ask: \"How do you get the oxygen?\" (e.g., nasal prongs, face mask).     - If Yes, ask: \"How much oxygen are you supposed to use?\" (e.g., 1-2 L NC)      Yes, O2 concentrator via NC at 4 L    4.  OXYGEN EQUIPMENT:  \"Are you having any trouble with your oxygen equipment?\"  (e.g., cannula, mask, tubing, tank, concentrator)      No    5. OXYGEN SATURATION MONITOR:      - \"Do you use an oxygen saturation monitor (pulse oximeter) at home?\"     - If Yes, ask: \"Where do you place the probe?\" (e.g., fingertip, ear lobe)      It was 86% a minute ago and is currently 90%    6. OXYGEN LEVEL: \"What is your reading (oxygen level) today?\" \"What is your usual oxygen saturation reading?\" (e.g., 95%)  91-93%    7: VSS MONITORING: \"Do you monitor/measure your oxygen level or vital signs?\" (e.g., yes, no, measurements are automatically sent to provider/call center). Document CURRENT and NORMAL BASELINE values if available.      -  P: \"What is your pulse rate per minute?\"    -  RR: \"What is your respiratory rate per minute?\"      Pulse is currently 98 but has been fluctuating, unsure of respiratory rate    8. BREATHING DIFFICULTY: \"Are you having any difficulty breathing?\" If Yes, ask: \"How bad is it?\"  (e.g., none, mild, moderate, severe)     - MILD: No SOB at rest, mild SOB with walking, speaks normally in sentences, able to lie down, no retractions, pulse < 100.     - MODERATE: SOB at rest, SOB with minimal exertion and prefers to sit, cannot lie down flat, speaks in phrases, mild retractions, audible wheezing, pulse 100-120.     - SEVERE: Very SOB at rest, speaks in single words, struggling to breathe, sitting hunched forward, retractions, pulse > 120       Yes Mild-moderate    9. OTHER SYMPTOMS: \"Do you have any other symptoms?\" (e.g., fever, change in sputum)     " " Slight fever that comes and goes, change in sputum as is now blood tinged at times, rust colored    10. SMOKING: \"Do you smoke currently?\" \"Is there anyone that smokes around you?\"  (Note: smoking around oxygen is dangerous!)        No    Protocols used: OXYGEN MONITORING AND [id number]      "

## 2022-08-19 NOTE — DISCHARGE INSTRUCTIONS
Complete antibiotics and steroids you are currently taking.  Follow-up with Dr. Proctor as already scheduled.    Continue to take your scheduled DuoNeb's and all others prescribed breathing treatments.    Return to the emergency department if you have significant worsening of your shortness of breath or chest pain or chest tightness.

## 2022-08-24 ENCOUNTER — APPOINTMENT (OUTPATIENT)
Dept: GENERAL RADIOLOGY | Facility: OTHER | Age: 77
End: 2022-08-24
Attending: EMERGENCY MEDICINE
Payer: COMMERCIAL

## 2022-08-24 ENCOUNTER — HOSPITAL ENCOUNTER (EMERGENCY)
Facility: OTHER | Age: 77
Discharge: HOME | End: 2022-08-24
Attending: EMERGENCY MEDICINE | Admitting: EMERGENCY MEDICINE
Payer: COMMERCIAL

## 2022-08-24 VITALS
OXYGEN SATURATION: 95 % | RESPIRATION RATE: 16 BRPM | BODY MASS INDEX: 26.52 KG/M2 | TEMPERATURE: 98.2 F | HEART RATE: 60 BPM | HEIGHT: 68 IN | DIASTOLIC BLOOD PRESSURE: 78 MMHG | WEIGHT: 175 LBS | SYSTOLIC BLOOD PRESSURE: 127 MMHG

## 2022-08-24 DIAGNOSIS — J44.9 CHRONIC OBSTRUCTIVE PULMONARY DISEASE, UNSPECIFIED COPD TYPE (H): ICD-10-CM

## 2022-08-24 LAB
ALBUMIN UR-MCNC: NEGATIVE MG/DL
ANION GAP SERPL CALCULATED.3IONS-SCNC: 9 MMOL/L (ref 3–14)
APPEARANCE UR: CLEAR
BACTERIA #/AREA URNS HPF: ABNORMAL /HPF
BASE EXCESS BLDA CALC-SCNC: 1.2 MMOL/L (ref -9–1.8)
BASOPHILS # BLD AUTO: 0.1 10E3/UL (ref 0–0.2)
BASOPHILS NFR BLD AUTO: 1 %
BILIRUB UR QL STRIP: NEGATIVE
BUN SERPL-MCNC: 29 MG/DL (ref 7–25)
CALCIUM SERPL-MCNC: 9.5 MG/DL (ref 8.6–10.3)
CHLORIDE BLD-SCNC: 101 MMOL/L (ref 98–107)
CO2 SERPL-SCNC: 24 MMOL/L (ref 21–31)
COLOR UR AUTO: ABNORMAL
CREAT SERPL-MCNC: 1.05 MG/DL (ref 0.7–1.3)
EOSINOPHIL # BLD AUTO: 0.3 10E3/UL (ref 0–0.7)
EOSINOPHIL NFR BLD AUTO: 2 %
ERYTHROCYTE [DISTWIDTH] IN BLOOD BY AUTOMATED COUNT: 12.5 % (ref 10–15)
GFR SERPL CREATININE-BSD FRML MDRD: 74 ML/MIN/1.73M2
GLUCOSE BLD-MCNC: 116 MG/DL (ref 70–105)
GLUCOSE UR STRIP-MCNC: NEGATIVE MG/DL
HCO3 BLD-SCNC: 25 MMOL/L (ref 21–28)
HCT VFR BLD AUTO: 46.5 % (ref 40–53)
HGB BLD-MCNC: 16.2 G/DL (ref 13.3–17.7)
HGB UR QL STRIP: NEGATIVE
IMM GRANULOCYTES # BLD: 0.2 10E3/UL
IMM GRANULOCYTES NFR BLD: 2 %
KETONES UR STRIP-MCNC: NEGATIVE MG/DL
LEUKOCYTE ESTERASE UR QL STRIP: NEGATIVE
LYMPHOCYTES # BLD AUTO: 2 10E3/UL (ref 0.8–5.3)
LYMPHOCYTES NFR BLD AUTO: 18 %
MCH RBC QN AUTO: 34.4 PG (ref 26.5–33)
MCHC RBC AUTO-ENTMCNC: 34.8 G/DL (ref 31.5–36.5)
MCV RBC AUTO: 99 FL (ref 78–100)
MONOCYTES # BLD AUTO: 1 10E3/UL (ref 0–1.3)
MONOCYTES NFR BLD AUTO: 9 %
NEUTROPHILS # BLD AUTO: 7.4 10E3/UL (ref 1.6–8.3)
NEUTROPHILS NFR BLD AUTO: 68 %
NITRATE UR QL: NEGATIVE
NRBC # BLD AUTO: 0 10E3/UL
NRBC BLD AUTO-RTO: 0 /100
NT-PROBNP SERPL-MCNC: 51 PG/ML (ref 0–100)
O2/TOTAL GAS SETTING VFR VENT: 36 %
OXYHGB MFR BLD: 95 % (ref 92–100)
PCO2 BLD: 38 MM HG (ref 35–45)
PH BLD: 7.43 [PH] (ref 7.35–7.45)
PH UR STRIP: 6 [PH] (ref 5–9)
PLATELET # BLD AUTO: 245 10E3/UL (ref 150–450)
PO2 BLD: 82 MM HG (ref 80–105)
POTASSIUM BLD-SCNC: 4.1 MMOL/L (ref 3.5–5.1)
PROCALCITONIN SERPL-MCNC: <0.5 NG/ML
RBC # BLD AUTO: 4.71 10E6/UL (ref 4.4–5.9)
RBC URINE: 0 /HPF
SODIUM SERPL-SCNC: 134 MMOL/L (ref 134–144)
SP GR UR STRIP: 1.01 (ref 1–1.03)
UROBILINOGEN UR STRIP-MCNC: NORMAL MG/DL
WBC # BLD AUTO: 10.9 10E3/UL (ref 4–11)
WBC URINE: <1 /HPF

## 2022-08-24 PROCEDURE — 80048 BASIC METABOLIC PNL TOTAL CA: CPT | Performed by: EMERGENCY MEDICINE

## 2022-08-24 PROCEDURE — 81001 URINALYSIS AUTO W/SCOPE: CPT | Performed by: EMERGENCY MEDICINE

## 2022-08-24 PROCEDURE — 71046 X-RAY EXAM CHEST 2 VIEWS: CPT

## 2022-08-24 PROCEDURE — 82805 BLOOD GASES W/O2 SATURATION: CPT | Performed by: EMERGENCY MEDICINE

## 2022-08-24 PROCEDURE — 99284 EMERGENCY DEPT VISIT MOD MDM: CPT | Performed by: EMERGENCY MEDICINE

## 2022-08-24 PROCEDURE — 36415 COLL VENOUS BLD VENIPUNCTURE: CPT | Performed by: EMERGENCY MEDICINE

## 2022-08-24 PROCEDURE — 83880 ASSAY OF NATRIURETIC PEPTIDE: CPT | Performed by: EMERGENCY MEDICINE

## 2022-08-24 PROCEDURE — 94640 AIRWAY INHALATION TREATMENT: CPT

## 2022-08-24 PROCEDURE — 85025 COMPLETE CBC W/AUTO DIFF WBC: CPT | Performed by: EMERGENCY MEDICINE

## 2022-08-24 PROCEDURE — 99284 EMERGENCY DEPT VISIT MOD MDM: CPT | Mod: 25 | Performed by: EMERGENCY MEDICINE

## 2022-08-24 PROCEDURE — 36600 WITHDRAWAL OF ARTERIAL BLOOD: CPT | Performed by: EMERGENCY MEDICINE

## 2022-08-24 PROCEDURE — 84145 PROCALCITONIN (PCT): CPT | Performed by: EMERGENCY MEDICINE

## 2022-08-24 PROCEDURE — 250N000009 HC RX 250: Performed by: EMERGENCY MEDICINE

## 2022-08-24 RX ORDER — IPRATROPIUM BROMIDE AND ALBUTEROL SULFATE 2.5; .5 MG/3ML; MG/3ML
3 SOLUTION RESPIRATORY (INHALATION) ONCE
Status: COMPLETED | OUTPATIENT
Start: 2022-08-24 | End: 2022-08-24

## 2022-08-24 RX ADMIN — IPRATROPIUM BROMIDE AND ALBUTEROL SULFATE 3 ML: .5; 3 SOLUTION RESPIRATORY (INHALATION) at 12:29

## 2022-08-24 ASSESSMENT — ACTIVITIES OF DAILY LIVING (ADL): ADLS_ACUITY_SCORE: 35

## 2022-08-24 NOTE — ED TRIAGE NOTES
Pt here with wife, pt reports foul smelling urine and some urinary retention that started last night, pt reports that he is being treated for pneumonia and has a hx of broken ribs and COPD and is oxygen dependent, VSS, pt brought back into ER to be evaluated     Triage Assessment     Row Name 08/24/22 1110       Triage Assessment (Adult)    Airway WDL WDL       Respiratory WDL    Respiratory WDL WDL       Skin Circulation/Temperature WDL    Skin Circulation/Temperature WDL WDL       Cardiac WDL    Cardiac WDL WDL       Peripheral/Neurovascular WDL    Peripheral Neurovascular WDL WDL       Cognitive/Neuro/Behavioral WDL    Cognitive/Neuro/Behavioral WDL WDL

## 2022-08-24 NOTE — ED PROVIDER NOTES
[facility]  Emergency Department Visit Note    Chief Complaint   Patient presents with     Urinary Retention       History of Present Illness     HPI:  Brent Villagomez is a 77 year old old male presenting to the Emergency Department today for evaluation of  dark urine and the feeling that he is incompletely emptying his bladder.  He has not had a history of frequent urinary tract infections in the past. Symptoms began yesterday.  He denies flank pain, hematuria, fever, chills, nausea, vomiting. He has oxygen and steroid dependent COPD and is quite dyspneic but states it is baseline since his last admission for pneumonia    Medications:  Prior to Admission medications    Medication Sig Last Dose Taking? Auth Provider Long Term End Date   albuterol (PROAIR HFA/PROVENTIL HFA/VENTOLIN HFA) 108 (90 Base) MCG/ACT inhaler Inhale 2 puffs into the lungs every 6 hours as needed for shortness of breath / dyspnea   Prince Proctor MD Yes    albuterol (PROVENTIL) (2.5 MG/3ML) 0.083% neb solution TAKE 3 ML BY NEBULIZATION EVERY FOUR HOURS AS NEEDED FOR SHORTNESS OF BREATH.   Prince Proctor MD Yes 9/23/22   amLODIPine (NORVASC) 5 MG tablet Take 2.5 mg by mouth daily    Reported, Patient Yes    aspirin EC 81 MG EC tablet Take 81 mg by mouth daily    Reported, Patient     cholecalciferol (VITAMIN D3) 25 mcg (1000 units) capsule Take 2 capsules by mouth daily   Reported, Patient     Fluticasone-Umeclidin-Vilanterol (TRELEGY ELLIPTA) 100-62.5-25 MCG/INH oral inhaler Inhale 1 puff into the lungs daily    Reported, Patient     HYDROmorphone (DILAUDID) 2 MG tablet Take 1 tablet (2 mg) by mouth every 4 hours as needed for moderate to severe pain   Bryan Mason MD     ipratropium - albuterol 0.5 mg/2.5 mg/3 mL (DUONEB) 0.5-2.5 (3) MG/3ML neb solution Take 1 vial (3 mLs) by nebulization every 6 hours as needed for shortness of breath / dyspnea or wheezing   Nakul Clements MD Yes    lisinopril-hydrochlorothiazide  (ZESTORETIC) 20-12.5 MG tablet Take 1 tablet by mouth daily    Reported, Patient Yes    loratadine (CLARITIN) 10 MG tablet Take 10 mg by mouth 2 times daily    Reported, Patient     LORazepam (ATIVAN) 1 MG tablet Take 1 tablet (1 mg) by mouth 3 times daily as needed for anxiety   Prince Proctor MD     magnesium oxide (MAG-OX) 400 MG tablet Take 400 mg by mouth daily   Reported, Patient     montelukast (SINGULAIR) 10 MG tablet Take 10 mg by mouth  Patient not taking: Reported on 8/18/2022   Reported, Patient Yes    montelukast (SINGULAIR) 10 MG tablet Take 10 mg by mouth At Bedtime    Reported, Patient Yes    multivitamin, therapeutic (THERA-VIT) TABS tablet Take 1 tablet by mouth daily   Unknown, Entered By History     naproxen (NAPROSYN) 500 MG tablet Take 1 tablet (500 mg) by mouth 2 times daily (with meals) for 15 days   Michela Johnson,   9/2/22   nicotine (COMMIT) 2 MG lozenge Place 2 mg inside cheek every hour as needed for smoking cessation   Unknown, Entered By History     predniSONE (DELTASONE) 20 MG tablet 2 tabs twice daily for 2 days, then one tab twice daily for 2 days, then 1/2 tab daily for 2 days then stop   Bryan Mason MD     Respiratory Therapy Supplies (INNOSPIRE REPLACEMENT FILTER) MISC    Reported, Patient     Respiratory Therapy Supplies (NEBULIZER/TUBING/MOUTHPIECE) KIT Use when taking nebs   Reported, Patient     sodium chloride (PF) 0.9% PF flush Inject 3 mLs into the vein  Patient not taking: Reported on 8/18/2022   Reported, Patient     tamsulosin (FLOMAX) 0.4 MG capsule Take 0.8 mg by mouth daily    Reported, Patient         Allergies:  Allergies   Allergen Reactions     No Clinical Screening - See Comments      Dust and pollen       Problem List:  Patient Active Problem List   Diagnosis     Actinic keratosis     Obstructive chronic bronchitis without exacerbation (H)     Benign neoplasm of colon     Degeneration of lumbar or lumbosacral intervertebral disc     Acute hepatitis C  virus infection     Hypertension     Obesity     Lichenification     Osteoarthritis, hand     Polycythemia, secondary     Disorder of porphyrin metabolism (H)     Tobacco user     Tachypnea     COPD exacerbation (H)     Community acquired pneumonia     Acute on chronic respiratory failure with hypoxia (H)     Alcohol dependence in remission (H)     Benign prostatic hyperplasia with lower urinary tract symptoms, symptom details unspecified     Colon polyps     Family history of abdominal aortic aneurysm     Health care directive on file     Major depressive disorder, recurrent episode, moderate (H)     Multiple pulmonary nodules     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, treated     COPD with emphysema (H)     Stage 3 severe COPD by GOLD classification (H)     Pain medication agreement-nonopioid 9-23-21     Other long term (current) drug therapy     Closed fracture of multiple ribs     Syncope     Bradycardia     Abdominal wall pain in left upper quadrant     Chest wall contusion, left, initial encounter     Pneumonia of left lower lobe due to infectious organism       Past Medical History:  Past Medical History:   Diagnosis Date     Chronic hepatitis C (H)     No Comments Provided     Disorder of porphyrin metabolism (H)     No Comments Provided     Lumbar sprain     No Comments Provided     Other and unspecified alcohol dependence, unspecified drinking behavior     No Comments Provided     Other specified chronic obstructive airways disease     No Comments Provided     Personal history of other specified diseases     occupational, hammering     Polycythemia, secondary     No Comments Provided     Unspecified visual loss     near and far       Past Surgical History:  Past Surgical History:   Procedure Laterality Date     CLOSED REDUCTION ANKLE      Fx ankle with plate       Social History:  Social History     Tobacco Use     Smoking status: Former Smoker     Packs/day: 1.00     Types: Cigarettes      "Quit date: 2016     Years since quittin.9     Smokeless tobacco: Never Used   Vaping Use     Vaping Use: Never used   Substance Use Topics     Alcohol use: No     Drug use: Never       Review of Systems:  Complete review of systems obtained and pertinent positive and negative findings noted in HPI. Review of systems otherwise negative.      Physical Exam     Vital signs: /84   Pulse 79   Temp 98.2  F (36.8  C) (Tympanic)   Resp 16   Ht 1.727 m (5' 8\")   Wt 79.4 kg (175 lb)   SpO2 97%   BMI 26.61 kg/m      Physical Exam:    General: awake, alert, comfortable  HEENT: no scleral injection, no nasal discharge, neck supple  Chest: non labored respirations, symmetric chest rise, no accessory muscle use  Cardiovascular: regular rate, regular rhythm, distally capillary refill intact  Abdomen: soft, nontender, no rebound or guarding, nondistended, no CVA tenderness bilaterally  Extremities: no deformities, edema, or cyanosis  Skin: warm, dry, no rashes  Neuro: alert, moving extremities x 4, ambulates without difficulty      Medical Decision Making & ED Course     Patient presents to the Emergency Department for evaluation of urinary symptoms. Differential includes urinary tract infection, hemorrhagic cystitis, pyelonephritis, urolithiasis Urinalysis does not suggest urinary tract infection. Given his dyspnea I did order CXR, procalcitonin, BNP, CBC and BMP. These were reassuring. He was given a duoneb and RT did assess him and felt he was better than when he was discharged. Patient and his wife feel comfortable for discharge.  All questions were answered and the patient is comfortable with plan for discharge. The patient was discharged in stable condition.     I have reviewed the patients  laboratory studies, imaging, and medical records.  .    Diagnosis & Disposition     Diagnosis:  1. Chronic obstructive pulmonary disease, unspecified COPD type (H)        Disposition:  Home    Belen Laguerre, " Belen Collins MD  08/24/22 [id number]

## 2022-08-25 ENCOUNTER — OFFICE VISIT (OUTPATIENT)
Dept: INTERNAL MEDICINE | Facility: OTHER | Age: 77
End: 2022-08-25
Attending: STUDENT IN AN ORGANIZED HEALTH CARE EDUCATION/TRAINING PROGRAM
Payer: COMMERCIAL

## 2022-08-25 VITALS
WEIGHT: 172.8 LBS | SYSTOLIC BLOOD PRESSURE: 148 MMHG | TEMPERATURE: 97 F | OXYGEN SATURATION: 98 % | BODY MASS INDEX: 26.27 KG/M2 | HEART RATE: 70 BPM | DIASTOLIC BLOOD PRESSURE: 80 MMHG | RESPIRATION RATE: 18 BRPM

## 2022-08-25 DIAGNOSIS — J44.9 STAGE 3 SEVERE COPD BY GOLD CLASSIFICATION (H): ICD-10-CM

## 2022-08-25 DIAGNOSIS — E80.20 DISORDER OF PORPHYRIN METABOLISM (H): ICD-10-CM

## 2022-08-25 DIAGNOSIS — Z79.899 OTHER LONG TERM (CURRENT) DRUG THERAPY: ICD-10-CM

## 2022-08-25 DIAGNOSIS — J44.1 COPD EXACERBATION (H): ICD-10-CM

## 2022-08-25 DIAGNOSIS — R55 SYNCOPE, UNSPECIFIED SYNCOPE TYPE: ICD-10-CM

## 2022-08-25 DIAGNOSIS — F33.1 MAJOR DEPRESSIVE DISORDER, RECURRENT EPISODE, MODERATE (H): ICD-10-CM

## 2022-08-25 DIAGNOSIS — Z09 HOSPITAL DISCHARGE FOLLOW-UP: Primary | ICD-10-CM

## 2022-08-25 DIAGNOSIS — J96.11 CHRONIC RESPIRATORY FAILURE WITH HYPOXIA (H): ICD-10-CM

## 2022-08-25 DIAGNOSIS — F10.21 ALCOHOL DEPENDENCE IN REMISSION (H): ICD-10-CM

## 2022-08-25 DIAGNOSIS — J18.9 COMMUNITY ACQUIRED PNEUMONIA, UNSPECIFIED LATERALITY: ICD-10-CM

## 2022-08-25 DIAGNOSIS — J43.9 PULMONARY EMPHYSEMA, UNSPECIFIED EMPHYSEMA TYPE (H): ICD-10-CM

## 2022-08-25 PROCEDURE — 99215 OFFICE O/P EST HI 40 MIN: CPT | Performed by: STUDENT IN AN ORGANIZED HEALTH CARE EDUCATION/TRAINING PROGRAM

## 2022-08-25 PROCEDURE — G0463 HOSPITAL OUTPT CLINIC VISIT: HCPCS

## 2022-08-25 RX ORDER — IPRATROPIUM BROMIDE AND ALBUTEROL SULFATE 2.5; .5 MG/3ML; MG/3ML
3 SOLUTION RESPIRATORY (INHALATION)
COMMUNITY
Start: 2022-08-19 | End: 2022-10-25

## 2022-08-25 RX ORDER — OXYCODONE AND ACETAMINOPHEN 5; 325 MG/1; MG/1
1 TABLET ORAL
COMMUNITY
Start: 2022-08-18 | End: 2022-10-25

## 2022-08-25 RX ORDER — IPRATROPIUM BROMIDE AND ALBUTEROL SULFATE 2.5; .5 MG/3ML; MG/3ML
1 SOLUTION RESPIRATORY (INHALATION) EVERY 6 HOURS PRN
Qty: 90 ML | Refills: 4 | Status: SHIPPED | OUTPATIENT
Start: 2022-08-25 | End: 2022-10-06

## 2022-08-25 RX ORDER — HYDROMORPHONE HYDROCHLORIDE 2 MG/1
2 TABLET ORAL EVERY 4 HOURS PRN
Qty: 15 TABLET | Refills: 0 | Status: SHIPPED | OUTPATIENT
Start: 2022-08-25 | End: 2022-10-25

## 2022-08-25 ASSESSMENT — ANXIETY QUESTIONNAIRES
GAD7 TOTAL SCORE: 15
GAD7 TOTAL SCORE: 15
1. FEELING NERVOUS, ANXIOUS, OR ON EDGE: MORE THAN HALF THE DAYS
3. WORRYING TOO MUCH ABOUT DIFFERENT THINGS: MORE THAN HALF THE DAYS
4. TROUBLE RELAXING: MORE THAN HALF THE DAYS
7. FEELING AFRAID AS IF SOMETHING AWFUL MIGHT HAPPEN: NEARLY EVERY DAY
6. BECOMING EASILY ANNOYED OR IRRITABLE: NEARLY EVERY DAY
GAD7 TOTAL SCORE: 15
5. BEING SO RESTLESS THAT IT IS HARD TO SIT STILL: NOT AT ALL
7. FEELING AFRAID AS IF SOMETHING AWFUL MIGHT HAPPEN: NEARLY EVERY DAY
8. IF YOU CHECKED OFF ANY PROBLEMS, HOW DIFFICULT HAVE THESE MADE IT FOR YOU TO DO YOUR WORK, TAKE CARE OF THINGS AT HOME, OR GET ALONG WITH OTHER PEOPLE?: VERY DIFFICULT
2. NOT BEING ABLE TO STOP OR CONTROL WORRYING: NEARLY EVERY DAY
IF YOU CHECKED OFF ANY PROBLEMS ON THIS QUESTIONNAIRE, HOW DIFFICULT HAVE THESE PROBLEMS MADE IT FOR YOU TO DO YOUR WORK, TAKE CARE OF THINGS AT HOME, OR GET ALONG WITH OTHER PEOPLE: VERY DIFFICULT

## 2022-08-25 ASSESSMENT — PATIENT HEALTH QUESTIONNAIRE - PHQ9
10. IF YOU CHECKED OFF ANY PROBLEMS, HOW DIFFICULT HAVE THESE PROBLEMS MADE IT FOR YOU TO DO YOUR WORK, TAKE CARE OF THINGS AT HOME, OR GET ALONG WITH OTHER PEOPLE: VERY DIFFICULT
SUM OF ALL RESPONSES TO PHQ QUESTIONS 1-9: 13
SUM OF ALL RESPONSES TO PHQ QUESTIONS 1-9: 13

## 2022-08-25 ASSESSMENT — PAIN SCALES - GENERAL: PAINLEVEL: SEVERE PAIN (6)

## 2022-08-25 NOTE — PROGRESS NOTES
Assessment & Plan         ICD-10-CM    1. Hospital discharge follow-up  Z09    2. Other long term (current) drug therapy  Z79.899 HYDROmorphone (DILAUDID) 2 MG tablet   3. COPD exacerbation (H)  J44.1 ipratropium - albuterol 0.5 mg/2.5 mg/3 mL (DUONEB) 0.5-2.5 (3) MG/3ML neb solution   4. Community acquired pneumonia, unspecified laterality  J18.9    5. Pulmonary emphysema, unspecified emphysema type (H)  J43.9    6. Syncope, unspecified syncope type  R55    7. Stage 3 severe COPD by GOLD classification (H)  J44.9    8. Alcohol dependence in remission (H)  F10.21    9. Major depressive disorder, recurrent episode, moderate (H)  F33.1    10. Chronic respiratory failure with hypoxia (H)  J96.11    11. 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  Patient has completed his course of antibiotics and steroids and his breathing is back to baseline.  Advised him to wean his oxygen back down towards his 2 L of resting oxygen with goal oxygen saturations of 88 to 92%.  Reviewed ED notes, hospitalization notes at length.    Syncope: Has Zio patch in place.  Await results.  Refer to cardiology if arrhythmia.  Suspect that syncope was related to hypoxia as he was not wearing his oxygen for significant amount of time.  Do not think we need echocardiogram or further evaluation at this point unless recurs.      Goals of care: Discussed with patient And his wishes and he does not want to be on a ventilator and is DNR.  He saw his brother die 2 years ago and has very clear wishes to not be on the ventilator like his brother was.  He has an advanced care document at home and he will bring this in to have it scanned to his chart.  He has significant COPD and he knows he is near end-stage.  We discussed the possibility of hospice but he  is not quite ready from my or his perspective yet.    44 minutes spent on the date of the encounter doing chart review, review of test results, patient visit, documentation and discussion with family     Follow-up with me in 2 months for annual exam and follow-up of COPD.    No follow-ups on file.    Prince Proctor MD  [facility]      Eulogio Kennedy is a 77 year old, presenting for the following health issues:  Hospital F/U      History of Present Illness       COPD:  He presents for follow up of COPD.  Overall, COPD symptoms are better since last visit. He has more than usual fatigue or shortness of breath with exertion and more than usual shortness of breath at rest.  He sometimes coughs and does have change in sputum. Patient has had recent fever. He can walk the length of 1-2 rooms without stopping to rest. He can walk 2 flights of stairs without resting.The patient has had an ED, urgent care, or hospital admission because of COPD since the last visit. He states he has had 2 visit(s) to an ED, Urgent Care, or Hospital due to his COPD.    Reason for visit:  Hospital follow up    He eats 0-1 servings of fruits and vegetables daily.He consumes 1 sweetened beverage(s) daily.He exercises with enough effort to increase his heart rate 9 or less minutes per day.  He exercises with enough effort to increase his heart rate 3 or less days per week.   He is taking medications regularly.    Today's PHQ-9         PHQ-9 Total Score: 13    PHQ-9 Q9 Thoughts of better off dead/self-harm past 2 weeks :   Not at all    How difficult have these problems made it for you to do your work, take care of things at home, or get along with other people: Very difficult  Today's SULLY-7 Score: 15     Fall:  Couldn't sleep, got up to make a sandwhich. Was not wearing oxygen.   Felt a little dizzy walking. Felt back to normal but just passed out. On 4L of oxygen resting.   Has zio patch in place.       Fractured ribs and  needs refill of hydromorphone for rib pain.     Discussed advance care planning      Feels quite anxious and depressed at times.  Thinks it is due to the prednisone is getting much better.  He would like to continue watching it for now.        Hospital Follow-up Visit:    Hospital/Nursing Home/IP Rehab Facility: [facility]  Date of Admission: 8/12/22  Date of Discharge: 8/17/22  Reason(s) for Admission: shortness of breath    Was your hospitalization related to COVID-19? No   Problems taking medications regularly:  None  Medication changes since discharge: Salvador Romero  Problems adhering to non-medication therapy:  None    Summary of hospitalization:  [facility] discharge summary reviewed  Diagnostic Tests/Treatments reviewed.  Follow up needed: none  Other Healthcare Providers Involved in Patient s Care:         None  Update since discharge: stable.       Post Medication Reconciliation Status: Discharge medications reconciled, continue medications without change      Breathing is better since leaving the hospital. On 4L resting and and had been on 2L in the past resting.     Depression: nebulizer and prednisone affect moods. Would like to ride it out.     Not using ativan with pain medicine.     Plan of care communicated with patient and family             Review of Systems         Objective    BP (!) 148/80 (BP Location: Right arm, Patient Position: Sitting, Cuff Size: Adult Regular)   Pulse 70   Temp 97  F (36.1  C) (Tympanic)   Resp 18   Wt 78.4 kg (172 lb 12.8 oz)   SpO2 98%   BMI 26.27 kg/m    Body mass index is 26.27 kg/m .  Physical Exam   General: Pleasant 77-year-old man accompanied by his wife in clinic  Pulmonary: Diffuse end expiratory wheezing posterior lung fields, no crackles or rales.    CV: Distant heart sounds no murmur appreciated.  No significant peripheral edema    Reviewed ED labs, do not need to repeat today.                .  ..

## 2022-08-25 NOTE — NURSING NOTE
"Chief Complaint   Patient presents with     Hospital F/U       FOOD SECURITY SCREENING QUESTIONS  Hunger Vital Signs:  Within the past 12 months we worried whether our food would run out before we got money to buy more. Never  Within the past 12 months the food we bought just didn't last and we didn't have money to get more. Never  Omayra White LPN 8/25/2022 9:09 AM      Initial BP (!) 148/80 (BP Location: Right arm, Patient Position: Sitting, Cuff Size: Adult Regular)   Pulse 70   Temp 97  F (36.1  C) (Tympanic)   Resp 18   Wt 78.4 kg (172 lb 12.8 oz)   SpO2 98%   BMI 26.27 kg/m   Estimated body mass index is 26.27 kg/m  as calculated from the following:    Height as of 8/24/22: 1.727 m (5' 8\").    Weight as of this encounter: 78.4 kg (172 lb 12.8 oz).  Medication Reconciliation: complete    Omayra White LPN  "

## 2022-08-29 ENCOUNTER — TELEPHONE (OUTPATIENT)
Dept: INTERNAL MEDICINE | Facility: OTHER | Age: 77
End: 2022-08-29

## 2022-08-29 NOTE — TELEPHONE ENCOUNTER
BAS-patient wife called asking for early refills on his medication     Please call and advise  Thank You    Tiffanie Andrews on 8/29/2022 at 9:06 AM

## 2022-09-02 ENCOUNTER — NURSE TRIAGE (OUTPATIENT)
Dept: INTERNAL MEDICINE | Facility: OTHER | Age: 77
End: 2022-09-02

## 2022-09-02 NOTE — TELEPHONE ENCOUNTER
"Patient had taken a couple doses of Pepto Bismol two days ago. Per Micromedex pepto bismol: may cause a temporary and harmless darkening of the tongue and/or black stool. Advised patient to monitor the stool color over the weekend and if it has not return to normal by Tuesday to call the clinic. Also, if symptoms were to worsen or new symptoms develop, advised he would need to be evaluated sooner. Patient agreeable to plan. Deepti Stallworth RN on 9/2/2022 at 2:43 PM      Reason for Disposition    Black or tarry bowel movements    Age > 50 years    Additional Information    Negative: Shock suspected (e.g., cold/pale/clammy skin, too weak to stand, low BP, rapid pulse)    Negative: Difficult to awaken or acting confused (e.g., disoriented, slurred speech)    Negative: Passed out (i.e., lost consciousness, collapsed and was not responding)    Negative: [1] Vomiting AND [2] contains red blood or black (\"coffee ground\") material  (Exception: few red streaks in vomit that only happened once)    Negative: Sounds like a life-threatening emergency to the triager    Negative: Diarrhea is main symptom    Negative: Stool color other than brown or tan is main concern  (no bleeding and no melena)    Negative: SEVERE rectal bleeding (large blood clots; constant or on and off bleeding)    Negative: SEVERE dizziness (e.g., unable to stand, requires support to walk, feels like passing out now)    Negative: [1] MODERATE rectal bleeding (small blood clots, passing blood without stool, or toilet water turns red) AND [2] more than once a day    Negative: Pale skin (pallor) of new-onset or worsening    Negative: Black or tarry bowel movements (Exception: chronic-unchanged black-grey bowel movements AND is taking iron pills or Pepto-bismol)    Negative: [1] Constant abdominal pain AND [2] present > 2 hours    Negative: Rectal foreign body (i.e., now or within past week;  inserted or swallowed)    Negative: High-risk adult (e.g., prior " "surgery on aorta, abdominal aortic aneurysm)    Negative: Taking Coumadin (warfarin) or other strong blood thinner, or known bleeding disorder (e.g., thrombocytopenia)    Negative: Known cirrhosis of the liver (or history of liver failure or ascites)    Negative: [1] Colonoscopy AND [2] in past 72 hours    Negative: Patient sounds very sick or weak to the triager    Negative: MODERATE rectal bleeding (small blood clots, passing blood without stool, or toilet water turns red)    Negative: MILD rectal bleeding (more than just a few drops or streaks)    Negative: Cancer of rectum or intestines (colon)    Negative: Radiation therapy to lower abdomen or pelvis    Answer Assessment - Initial Assessment Questions  1. COLOR: \"What color is it?\" \"Is that color in part or all of the stool?\"      black  2. ONSET: \"When was the unusual color first noted?\"      Last night and this afternoon  3. CAUSE: \"Have you eaten any food or taken any medicine of this color?\" (See listing in BACKGROUND)      Pepto bismol taken the night before last  4. OTHER SYMPTOMS: \"Do you have any other symptoms?\" (e.g., diarrhea, jaundice, abdominal pain, fever).      Upset stomach, indigestion    Answer Assessment - Initial Assessment Questions  1. APPEARANCE of BLOOD: \"What color is it?\" \"Is it passed separately, on the surface of the stool, or mixed in with the stool?\"       black  2. AMOUNT: \"How much blood was passed?\"       unsure  3. FREQUENCY: \"How many times has blood been passed with the stools?\"       Four times between last night and this afternoon  4. ONSET: \"When was the blood first seen in the stools?\" (Days or weeks)       Last night  5. DIARRHEA: \"Is there also some diarrhea?\" If Yes, ask: \"How many diarrhea stools in the past 24 hours?\"       denies  6. CONSTIPATION: \"Do you have constipation?\" If Yes, ask: \"How bad is it?\"      Yes, been complaining about it since being in the hospital, but passed four stools in the last couple of " "days  7. RECURRENT SYMPTOMS: \"Have you had blood in your stools before?\" If Yes, ask: \"When was the last time?\" and \"What happened that time?\"       denies  8. BLOOD THINNERS: \"Do you take any blood thinners?\" (e.g., Coumadin/warfarin, Pradaxa/dabigatran, aspirin)      Aspirin 81 mg  9. OTHER SYMPTOMS: \"Do you have any other symptoms?\"  (e.g., abdomen pain, vomiting, dizziness, fever)      indigestion  10. PREGNANCY: \"Is there any chance you are pregnant?\" \"When was your last menstrual period?\"        n/a    Protocols used: STOOLS - UNUSUAL COLOR-A-AH, RECTAL BLEEDING-A-AH      "

## 2022-09-04 ENCOUNTER — HEALTH MAINTENANCE LETTER (OUTPATIENT)
Age: 77
End: 2022-09-04

## 2022-10-05 DIAGNOSIS — J44.1 COPD EXACERBATION (H): ICD-10-CM

## 2022-10-06 ENCOUNTER — TELEPHONE (OUTPATIENT)
Dept: INTERNAL MEDICINE | Facility: OTHER | Age: 77
End: 2022-10-06

## 2022-10-06 DIAGNOSIS — J44.1 COPD EXACERBATION (H): ICD-10-CM

## 2022-10-06 RX ORDER — IPRATROPIUM BROMIDE AND ALBUTEROL SULFATE 2.5; .5 MG/3ML; MG/3ML
1 SOLUTION RESPIRATORY (INHALATION) EVERY 6 HOURS PRN
Qty: 90 ML | Refills: 4 | Status: SHIPPED | OUTPATIENT
Start: 2022-10-06 | End: 2022-10-25

## 2022-10-06 NOTE — TELEPHONE ENCOUNTER
Requested Prescriptions   Pending Prescriptions Disp Refills     ipratropium - albuterol 0.5 mg/2.5 mg/3 mL (DUONEB) 0.5-2.5 (3) MG/3ML neb solution 90 mL 4     Sig: Take 1 vial (3 mLs) by nebulization every 6 hours as needed for shortness of breath / dyspnea or wheezing   Last Prescription Date:   8/25/22  Last Fill Qty/Refills:         90 ml, R-4    Last Office Visit:              8/25/22   Future Office visit:             Next 5 appointments (look out 90 days)    Oct 25, 2022 11:00 AM  PHYSICAL with Prince Proctor MD  [facility] ([facility] ) [address]  [place] [id number] [phone]        Per LOV note:  Follow-up with me in 2 months for annual exam and follow-up of COPD.    Evita Castañeda RN .............. 10/6/2022  11:52 AM

## 2022-10-06 NOTE — TELEPHONE ENCOUNTER
Reason for call: Medication or medication refill    Name of medication requested: ipratropium    Are you out of the medication? Yes    What pharmacy do you use? [facility]    Preferred method for responding to this message: Telephone Call    Phone number patient can be reached at: Cell number on file:    Telephone Information:   Mobile [phone]       If we cannot reach you directly, may we leave a detailed response at the number you provided? Yes

## 2022-10-06 NOTE — TELEPHONE ENCOUNTER
Returned call to wife (see consent to communicate), after she verified her last name and . She was informed of prescription. Evita Castañeda RN .............. 10/6/2022  1:33 PM

## 2022-10-06 NOTE — TELEPHONE ENCOUNTER
Need a refill for Dual nebulizer and has questions. Please call      Kate Coronel on 10/6/2022 at 9:57 AM

## 2022-10-07 NOTE — TELEPHONE ENCOUNTER
"  Last Prescription Date: 8/19/22  Last Qty/Refills: 90 ml / 4  Last Office Visit: 8/25/22  Future Office Visit: 10/25/22     Requested Prescriptions   Pending Prescriptions Disp Refills     ipratropium - albuterol 0.5 mg/2.5 mg/3 mL (DUONEB) 0.5-2.5 (3) MG/3ML neb solution [Pharmacy Med Name: Ipratropium-Albuterol 0.5-2.5 (3) MG/3ML Inhalation Solution (DUO-NEB)] 90 mL 4     Sig: Take 3 mL by nebulization every six hours as needed for shortness of breath / dyspnea or wheezing.       Short-Acting Beta Agonist Inhalers Protocol  Passed - 10/5/2022 10:40 AM        Passed - Patient is age 12 or older        Passed - Recent (12 mo) or future (30 days) visit within the authorizing provider's specialty     Patient has had an office visit with the authorizing provider or a provider within the authorizing providers department within the previous 12 mos or has a future within next 30 days. See \"Patient Info\" tab in inbasket, or \"Choose Columns\" in Meds & Orders section of the refill encounter.              Passed - Medication is active on med list       Asthma Nebs Protocol Passed - 10/5/2022 10:40 AM        Passed - Patient is age 4 years or older        Passed - Recent (12 mo) or future (30 days) visit within the authorizing provider's specialty     Patient has had an office visit with the authorizing provider or a provider within the authorizing providers department within the previous 12 mos or has a future within next 30 days. See \"Patient Info\" tab in inbasket, or \"Choose Columns\" in Meds & Orders section of the refill encounter.              Passed - Medication is active on med list             "

## 2022-10-11 RX ORDER — IPRATROPIUM BROMIDE AND ALBUTEROL SULFATE 2.5; .5 MG/3ML; MG/3ML
SOLUTION RESPIRATORY (INHALATION)
Qty: 90 ML | Refills: 4 | Status: SHIPPED | OUTPATIENT
Start: 2022-10-11 | End: 2022-10-25

## 2022-10-25 ENCOUNTER — OFFICE VISIT (OUTPATIENT)
Dept: INTERNAL MEDICINE | Facility: OTHER | Age: 77
End: 2022-10-25
Attending: STUDENT IN AN ORGANIZED HEALTH CARE EDUCATION/TRAINING PROGRAM
Payer: COMMERCIAL

## 2022-10-25 VITALS
SYSTOLIC BLOOD PRESSURE: 112 MMHG | HEART RATE: 76 BPM | OXYGEN SATURATION: 96 % | TEMPERATURE: 97.6 F | RESPIRATION RATE: 16 BRPM | DIASTOLIC BLOOD PRESSURE: 64 MMHG

## 2022-10-25 DIAGNOSIS — J96.21 ACUTE ON CHRONIC RESPIRATORY FAILURE WITH HYPOXIA (H): ICD-10-CM

## 2022-10-25 DIAGNOSIS — N40.1 BENIGN PROSTATIC HYPERPLASIA WITH LOWER URINARY TRACT SYMPTOMS, SYMPTOM DETAILS UNSPECIFIED: ICD-10-CM

## 2022-10-25 DIAGNOSIS — Z87.891 SMOKING HISTORY: ICD-10-CM

## 2022-10-25 DIAGNOSIS — F10.21 ALCOHOL DEPENDENCE IN REMISSION (H): ICD-10-CM

## 2022-10-25 DIAGNOSIS — J43.9 PULMONARY EMPHYSEMA, UNSPECIFIED EMPHYSEMA TYPE (H): ICD-10-CM

## 2022-10-25 DIAGNOSIS — F41.9 ANXIETY: ICD-10-CM

## 2022-10-25 DIAGNOSIS — F33.1 MAJOR DEPRESSIVE DISORDER, RECURRENT EPISODE, MODERATE (H): ICD-10-CM

## 2022-10-25 DIAGNOSIS — Z82.49 FAMILY HISTORY OF ABDOMINAL AORTIC ANEURYSM (AAA): ICD-10-CM

## 2022-10-25 DIAGNOSIS — J44.1 COPD EXACERBATION (H): ICD-10-CM

## 2022-10-25 DIAGNOSIS — Z00.00 MEDICARE ANNUAL WELLNESS VISIT, SUBSEQUENT: Primary | ICD-10-CM

## 2022-10-25 DIAGNOSIS — Z02.89 PAIN MEDICATION AGREEMENT: ICD-10-CM

## 2022-10-25 DIAGNOSIS — E83.42 HYPOMAGNESEMIA: ICD-10-CM

## 2022-10-25 DIAGNOSIS — J44.9 STAGE 3 SEVERE COPD BY GOLD CLASSIFICATION (H): ICD-10-CM

## 2022-10-25 DIAGNOSIS — I10 HYPERTENSION, UNSPECIFIED TYPE: ICD-10-CM

## 2022-10-25 PROCEDURE — G0439 PPPS, SUBSEQ VISIT: HCPCS | Performed by: STUDENT IN AN ORGANIZED HEALTH CARE EDUCATION/TRAINING PROGRAM

## 2022-10-25 PROCEDURE — 99214 OFFICE O/P EST MOD 30 MIN: CPT | Mod: 25 | Performed by: STUDENT IN AN ORGANIZED HEALTH CARE EDUCATION/TRAINING PROGRAM

## 2022-10-25 PROCEDURE — G0463 HOSPITAL OUTPT CLINIC VISIT: HCPCS

## 2022-10-25 RX ORDER — ALBUTEROL SULFATE 90 UG/1
2 AEROSOL, METERED RESPIRATORY (INHALATION) EVERY 6 HOURS PRN
Qty: 8.5 G | Refills: 11 | Status: SHIPPED | OUTPATIENT
Start: 2022-10-25

## 2022-10-25 RX ORDER — LORAZEPAM 1 MG/1
1 TABLET ORAL 3 TIMES DAILY PRN
Qty: 90 TABLET | Refills: 1 | Status: SHIPPED | OUTPATIENT
Start: 2022-10-25

## 2022-10-25 RX ORDER — TAMSULOSIN HYDROCHLORIDE 0.4 MG/1
0.8 CAPSULE ORAL DAILY
Qty: 180 CAPSULE | Refills: 3 | Status: SHIPPED | OUTPATIENT
Start: 2022-10-25

## 2022-10-25 RX ORDER — LISINOPRIL AND HYDROCHLOROTHIAZIDE 12.5; 2 MG/1; MG/1
1 TABLET ORAL DAILY
Qty: 90 TABLET | Refills: 3 | Status: SHIPPED | OUTPATIENT
Start: 2022-10-25

## 2022-10-25 RX ORDER — IPRATROPIUM BROMIDE AND ALBUTEROL SULFATE 2.5; .5 MG/3ML; MG/3ML
1 SOLUTION RESPIRATORY (INHALATION) EVERY 4 HOURS PRN
Qty: 90 ML | Refills: 4 | Status: SHIPPED | OUTPATIENT
Start: 2022-10-25 | End: 2023-01-27

## 2022-10-25 RX ORDER — LORAZEPAM 1 MG/1
1 TABLET ORAL 3 TIMES DAILY PRN
Qty: 90 TABLET | Refills: 0 | Status: SHIPPED | OUTPATIENT
Start: 2022-10-25 | End: 2022-10-25

## 2022-10-25 RX ORDER — MAGNESIUM OXIDE 400 MG/1
400 TABLET ORAL DAILY
Qty: 90 TABLET | Refills: 3 | Status: SHIPPED | OUTPATIENT
Start: 2022-10-25

## 2022-10-25 ASSESSMENT — ENCOUNTER SYMPTOMS
WEAKNESS: 1
CHILLS: 0
SORE THROAT: 0
CONSTIPATION: 0
HEMATURIA: 0
NAUSEA: 0
SHORTNESS OF BREATH: 1
FREQUENCY: 0
PALPITATIONS: 0
HEMATOCHEZIA: 0
FEVER: 0
ARTHRALGIAS: 1
NERVOUS/ANXIOUS: 0
DYSURIA: 0
HEARTBURN: 0
EYE PAIN: 0
DIARRHEA: 0
COUGH: 0
ABDOMINAL PAIN: 0
MYALGIAS: 1
PARESTHESIAS: 0
JOINT SWELLING: 1
HEADACHES: 0
DIZZINESS: 0

## 2022-10-25 ASSESSMENT — ACTIVITIES OF DAILY LIVING (ADL): CURRENT_FUNCTION: NO ASSISTANCE NEEDED

## 2022-10-25 ASSESSMENT — PATIENT HEALTH QUESTIONNAIRE - PHQ9
10. IF YOU CHECKED OFF ANY PROBLEMS, HOW DIFFICULT HAVE THESE PROBLEMS MADE IT FOR YOU TO DO YOUR WORK, TAKE CARE OF THINGS AT HOME, OR GET ALONG WITH OTHER PEOPLE: NOT DIFFICULT AT ALL
SUM OF ALL RESPONSES TO PHQ QUESTIONS 1-9: 3
SUM OF ALL RESPONSES TO PHQ QUESTIONS 1-9: 3

## 2022-10-25 ASSESSMENT — PAIN SCALES - GENERAL: PAINLEVEL: NO PAIN (0)

## 2022-10-25 NOTE — NURSING NOTE
"Chief Complaint   Patient presents with     Medicare Visit       Medication reconciliation completed.    FOOD SECURITY SCREENING QUESTIONS:    The next two questions are to help us understand your food security.  If you are feeling you need any assistance in this area, we have resources available to support you today.    Hunger Vital Signs:  Within the past 12 months we worried whether our food would run out before we got money to buy more. Never  Within the past 12 months the food we bought just didn't last and we didn't have money to get more. Never    Initial /64 (BP Location: Right arm, Patient Position: Sitting, Cuff Size: Adult Regular)   Pulse 76   Temp 97.6  F (36.4  C) (Temporal)   Resp 16   SpO2 96%  Estimated body mass index is 26.27 kg/m  as calculated from the following:    Height as of 8/24/22: 1.727 m (5' 8\").    Weight as of 8/25/22: 78.4 kg (172 lb 12.8 oz).       Nicole Esposito LPN .......  10/25/2022  11:22 AM  "

## 2022-10-25 NOTE — LETTER
Opioid / Opioid Plus Controlled Substance Agreement    This is an agreement between you and your provider about the safe and appropriate use of controlled substance/opioids prescribed by your care team. Controlled substances are medicines that can cause physical and mental dependence (abuse).    There are strict laws about having and using these medicines. We here at [facility] are committing to working with you in your efforts to get better. To support you in this work, we ll help you schedule regular office appointments for medicine refills. If we must cancel or change your appointment for any reason, we ll make sure you have enough medicine to last until your next appointment.     As a Provider, I will:    Listen carefully to your concerns and treat you with respect.     Recommend a treatment plan that I believe is in your best interest. This plan may involve therapies other than opioid pain medication.     Talk with you often about the possible benefits, and the risk of harm of any medicine that we prescribe for you.     Provide a plan on how to taper (discontinue or go off) using this medicine if the decision is made to stop its use.    As a Patient, I understand that opioid(s):     Are a controlled substance prescribed by my care team to help me function or work and manage my condition(s).     Are strong medicines and can cause serious side effects such as:    Drowsiness, which can seriously affect my driving ability    A lower breathing rate, enough to cause death    Harm to my thinking ability     Depression     Abuse of and addiction to this medicine    Need to be taken exactly as prescribed. Combining opioids with certain medicines or chemicals (such as illegal drugs, sedatives, sleeping pills, and benzodiazepines) can be dangerous or even fatal. If I stop opioids suddenly, I may have severe withdrawal symptoms.    Do not work for all types of pain nor for all patients. If they re not helpful, I may  be asked to stop them.        The risks, benefits and side effects of these medicine(s) were explained to me. I agree that:  1. I will take part in other treatments as advised by my care team. This may be psychiatry or counseling, physical therapy, behavioral therapy, group treatment or a referral to a specialist.     2. I will keep all my appointments. I understand that this is part of the monitoring of opioids. My care team may require an office visit for EVERY opioid/controlled substance refill. If I miss appointments or don t follow instructions, my care team may stop my medicine.    3. I will take my medicines as prescribed. I will not change the dose or schedule unless my care team tells me to. There will be no refills if I run out early.     4. I may be asked to come to the clinic and complete a urine drug test or complete a pill count at any time. If I don t give a urine sample or participate in a pill count, the care team may stop my medicine.    5. I will only receive prescriptions from this clinic for chronic pain. If I am treated by another provider for acute pain issues, I will tell them that I am taking opioid pain medication for chronic pain and that I have a treatment agreement with this provider. I will inform my [facility] care team within one business day if I am given a prescription for any pain medication by another healthcare provider. My [facility] care team can contact other providers and pharmacists about my use of any medicines.    6. It is up to me to make sure that I don t run out of my medicines on weekends or holidays. If my care team is willing to refill my opioid prescription without a visit, I must request refills only during office hours. Refills may take up to 3 business days to process. I will use one pharmacy to fill all my opioid and other controlled substance prescriptions. I will notify the clinic about any changes to my insurance or medication  availability.    7. I am responsible for my prescriptions. If the medicine/prescription is lost, stolen or destroyed, it will not be replaced. I also agree not to share controlled substance medicines with anyone.    8. I am aware I should not use any illegal or recreational drugs. I agree not to drink alcohol unless my care team says I can.       9. If I enroll in the [place] Medical Cannabis program, I will tell my care team prior to my next refill.     10. I will tell my care team right away if I become pregnant, have a new medical problem treated outside of my regular clinic, or have a change in my medications.    11. I understand that this medicine can affect my thinking, judgment and reaction time. Alcohol and drugs affect the brain and body, which can affect the safety of my driving. Being under the influence of alcohol or drugs can affect my decision-making, behaviors, personal safety, and the safety of others. Driving while impaired (DWI) can occur if a person is driving, operating, or in physical control of a car, motorcycle, boat, snowmobile, ATV, motorbike, off-road vehicle, or any other motor vehicle ([place] Statute 169A.20). I understand the risk if I choose to drive or operate any vehicle or machinery.    I understand that if I do not follow any of the conditions above, my prescriptions or treatment may be stopped or changed.          Opioids  What You Need to Know    What are opioids?   Opioids are pain medicines that must be prescribed by a doctor. They are also known as narcotics.     Examples are:   1. morphine (MS Contin, Isaura)  2. oxycodone (Oxycontin)  3. oxycodone and acetaminophen (Percocet)  4. hydrocodone and acetaminophen (Vicodin, Norco)   5. fentanyl patch (Duragesic)   6. hydromorphone (Dilaudid)   7. methadone  8. codeine (Tylenol #3)     What do opioids do well?   Opioids are best for severe short-term pain such as after a surgery or injury. They may work well for cancer pain. They may  help some people with long-lasting (chronic) pain.     What do opioids NOT do well?   Opioids never get rid of pain entirely, and they don t work well for most patients with chronic pain. Opioids don t reduce swelling, one of the causes of pain.                                    Other ways to manage chronic pain and improve function include:       Treat the health problem that may be causing pain    Anti-inflammation medicines, which reduce swelling and tenderness, such as ibuprofen (Advil, Motrin) or naproxen (Aleve)    Acetaminophen (Tylenol)    Antidepressants and anti-seizure medicines, especially for nerve pain    Topical treatments such as patches or creams    Injections or nerve blocks    Chiropractic or osteopathic treatment    Acupuncture, massage, deep breathing, meditation, visual imagery, aromatherapy    Use heat or ice at the pain site    Physical therapy     Exercise    Stop smoking    Take part in therapy       Risks and side effects     Talk to your doctor before you start or decide to keep taking opioids. Possible side effects include:      Lowering your breathing rate enough to cause death    Overdose, including death, especially if taking higher than prescribed doses    Worse depression symptoms; less pleasure in things you usually enjoy    Feeling tired or sluggish    Slower thoughts or cloudy thinking    Being more sensitive to pain over time; pain is harder to control    Trouble sleeping or restless sleep    Changes in hormone levels (for example, less testosterone)    Changes in sex drive or ability to have sex    Constipation    Unsafe driving    Itching and sweating    Dizziness    Nausea, throwing up and dry mouth    What else should I know about opioids?    Opioids may lead to dependence, tolerance, or addiction.      Dependence means that if you stop or reduce the medicine too quickly, you will have withdrawal symptoms. These include loose poop (diarrhea), jitters, flu-like symptoms,  nervousness and tremors. Dependence is not the same as addiction.                       Tolerance means needing higher doses over time to get the same effect. This may increase the chance of serious side effects.      Addiction is when people improperly use a substance that harms their body, their mind or their relations with others. Use of opiates can cause a relapse of addiction if you have a history of drug or alcohol abuse.      People who have used opioids for a long time may have a lower quality of life, worse depression, higher levels of pain and more visits to doctors.    You can overdose on opioids. Take these steps to lower your risk of overdose:    1. Recognize the signs:  Signs of overdose include decrease or loss of consciousness (blackout), slowed breathing, trouble waking up and blue lips. If someone is worried about overdose, they should call 911.    2. Talk to your doctor about Narcan (naloxone).   If you are at risk for overdose, you may be given a prescription for Narcan. This medicine very quickly reverses the effects of opioids.   If you overdose, a friend or family member can give you Narcan while waiting for the ambulance. They need to know the signs of overdose and how to give Narcan.     3. Don't use alcohol or street drugs.   Taking them with opioids can cause death.    4. Do not take any of these medicines unless your doctor says it s OK. Taking these with opioids can cause death:    Benzodiazepines, such as lorazepam (Ativan), alprazolam (Xanax) or diazepam (Valium)    Muscle relaxers, such as cyclobenzaprine (Flexeril)    Sleeping pills like zolpidem (Ambien)     Other opioids      How to keep you and other people safe while taking opioids:    1. Never share your opioids with others.  Opioid medicines are regulated by the Drug Enforcement Agency (IMANI). Selling or sharing medications is a criminal act.    2. Be sure to store opioids in a secure place, locked up if possible. Young children  can easily swallow them and overdose.    3. When you are traveling with your medicines, keep them in the original bottles. If you use a pill box, be sure you also carry a copy of your medicine list from your clinic or pharmacy.    4. Safe disposal of opioids    Most pharmacies have places to get rid of medicine, called disposal kiosks. Medicine disposal options are also available in every [facility]. Search your county and  medication disposal  to find more options. You can find more details at:  [email or web address]-[email or web address]     I agree that my provider, clinic care team, and pharmacy may work with any city, state or federal law enforcement agency that investigates the misuse, sale, or other diversion of my controlled medicine. I will allow my provider to discuss my care with, or share a copy of, this agreement with any other treating provider, pharmacy or emergency room where I receive care.    I have read this agreement and have asked questions about anything I did not understand.    _______________________________________________________  Patient Signature - Brent Villagomez _____________________                   Date     _______________________________________________________  Provider Signature - Prince Proctor MD   _____________________                   Date     _______________________________________________________  Witness Signature (required if provider not present while patient signing)   _____________________                   Date

## 2022-10-25 NOTE — PROGRESS NOTES
"SUBJECTIVE:   Brent Villagomez is a 77 year old male who presents for Preventive Visit.      Patient has been advised of split billing requirements and indicates understanding: Yes  Are you in the first 12 months of your Medicare Part B coverage?  No    Physical Health:    In general, how would you rate your overall physical health? poor    Outside of work, how many days during the week do you exercise? 1 day/week    Outside of work, approximately how many minutes a day do you exercise?less than 15 minutes    If you drink alcohol do you typically have >3 drinks per day or >7 drinks per week? No    Do you usually eat at least 4 servings of fruit and vegetables a day, include whole grains & fiber and avoid regularly eating high fat or \"junk\" foods? NO    Do you have any problems taking medications regularly?  No    Do you have any side effects from medications? none    Needs assistance for the following daily activities: no assistance needed    Which of the following safety concerns are present in your home?  none identified     Hearing impairment: No    In the past 6 months, have you been bothered by leaking of urine? no    Mental Health:    In general, how would you rate your overall mental or emotional health? good  PHQ-2 Score: 2    Do you feel safe in your environment? Yes    Have you ever done Advance Care Planning? (For example, a Health Directive, POLST, or a discussion with a medical provider or your loved ones about your wishes): yes    Additional concerns to address?  No    Fall risk:       Cognitive Screenin) Repeat 3 items (Leader, Season, Table)    2) Clock draw: ABNORMAL .  3) 3 item recall: Recalls 2 objects   Results: ABNORMAL clock, 1-2 items recalled: PROBABLE COGNITIVE IMPAIRMENT, **INFORM PROVIDER**    Mini-CogTM Copyright RUPINDER Solorio. Licensed by the author for use in [facility]; reprinted with permission (tiki@.[email or web address]). All rights reserved.      Do you have sleep apnea, excessive " snoring or daytime drowsiness?: no    Currently on 4L nasal cannula. Sats in mid 90s at home.     Just got covid booster and flu shots.    Quit smoking 8-10 years ago.  Finished using his nicotine lozenges 2 weeks ago.  He has done with nicotine currently.    He has been screened for lung cancer in the past was told he has a pulmonary nodule.  Reviewed his CT PE study which shows a stable size nodule from previous.  Do not need to repeat this at this year.      Brother  from AAA rupture.  Patient is a former smoker.  I cannot see a record of a AAA screening for the patient in the past and no CT of the abdomen with IV contrast to rule out AAA.    Has never been screened for prostate cancer.     Last colon cancer screening 3 years ago.   Declines further colon cancer screening.     Has been on zoloft and wellbutrin in the past for anxiety.           Reviewed and updated as needed this visit by clinical staff   Tobacco  Allergies  Meds  Problems  Med Hx  Surg Hx  Fam Hx  Soc   Hx        Reviewed and updated as needed this visit by Provider   Tobacco  Allergies  Meds  Problems  Med Hx  Surg Hx  Fam Hx         Social History     Tobacco Use     Smoking status: Former     Packs/day: 1.00     Types: Cigarettes     Quit date: 2016     Years since quittin.0     Smokeless tobacco: Never   Substance Use Topics     Alcohol use: No                           Current providers sharing in care for this patient include:   Patient Care Team:  Prince Proctor MD as PCP - General (Internal Medicine)  [facility], Fausto Arroyo MD as Assigned Pulmonology Provider  Prince Proctor MD as Assigned PCP    The following health maintenance items are reviewed in Epic and correct as of today:  Health Maintenance   Topic Date Due     ADVANCE CARE PLANNING  Never done     DEPRESSION ACTION PLAN  Never done     COLORECTAL CANCER SCREENING  Never done     LIPID  Never done     PHQ-9  2023     LUNG CANCER SCREENING   "08/12/2023     FALL RISK ASSESSMENT  08/25/2023     MEDICARE ANNUAL WELLNESS VISIT  10/25/2023     DTAP/TDAP/TD IMMUNIZATION (4 - Td or Tdap) 08/02/2032     SPIROMETRY  Completed     COPD ACTION PLAN  Completed     INFLUENZA VACCINE  Completed     Pneumococcal Vaccine: 65+ Years  Completed     ZOSTER IMMUNIZATION  Completed     HEPATITIS B IMMUNIZATION  Completed     AORTIC ANEURYSM SCREENING (SYSTEM ASSIGNED)  Completed     COVID-19 Vaccine  Completed     IPV IMMUNIZATION  Aged Out     MENINGITIS IMMUNIZATION  Aged Out     Labs reviewed in EPIC      ROS:  Constitutional, HEENT, cardiovascular, pulmonary, GI, , musculoskeletal, neuro, skin, endocrine and psych systems are negative, except as otherwise noted.    OBJECTIVE:   /64 (BP Location: Right arm, Patient Position: Sitting, Cuff Size: Adult Regular)   Pulse 76   Temp 97.6  F (36.4  C) (Temporal)   Resp 16   SpO2 96%  Estimated body mass index is 26.27 kg/m  as calculated from the following:    Height as of 8/24/22: 1.727 m (5' 8\").    Weight as of 8/25/22: 78.4 kg (172 lb 12.8 oz).  EXAM:   GENERAL: healthy, alert and no distress  EYES: Eyes grossly normal to inspection, PERRL and conjunctivae and sclerae normal  HENT: ear canals and TM's normal, nose and mouth without ulcers or lesions  NECK: no adenopathy, no asymmetry, masses, or scars and thyroid normal to palpation  RESP: lungs clear to auscultation - no rales, rhonchi or wheezes. Supplemental oxygen in place  CV: regular rate and rhythm, normal S1 S2, no S3 or S4, no murmur, click or rub, no peripheral edema and peripheral pulses strong  ABDOMEN: soft, nontender, no hepatosplenomegaly, no masses and bowel sounds normal  MS: no gross musculoskeletal defects noted, no edema  SKIN: no suspicious lesions or rashes  NEURO: Normal strength and tone, mentation intact and speech normal  PSYCH: mentation appears normal, affect normal/bright    Diagnostic Test Results:  Labs reviewed in " Epic    ASSESSMENT / PLAN:       ICD-10-CM    1. Medicare annual wellness visit, subsequent  Z00.00       2. Stage 3 severe COPD by GOLD classification (H)  J44.9 albuterol (PROAIR HFA/PROVENTIL HFA/VENTOLIN HFA) 108 (90 Base) MCG/ACT inhaler     Fluticasone-Umeclidin-Vilanterol (TRELEGY ELLIPTA) 100-62.5-25 MCG/INH oral inhaler      3. Pulmonary emphysema, unspecified emphysema type (H)  J43.9 albuterol (PROAIR HFA/PROVENTIL HFA/VENTOLIN HFA) 108 (90 Base) MCG/ACT inhaler      4. Acute on chronic respiratory failure with hypoxia (H)  J96.21       5. Alcohol dependence in remission (H)  F10.21       6. Major depressive disorder, recurrent episode, moderate (H)  F33.1       7. Smoking history  Z87.891 US Aorta Medicare AAA Screening      8. Family history of abdominal aortic aneurysm (AAA)  Z82.49 US Aorta Medicare AAA Screening      9. COPD exacerbation (H)  J44.1 ipratropium - albuterol 0.5 mg/2.5 mg/3 mL (DUONEB) 0.5-2.5 (3) MG/3ML neb solution      10. Anxiety  F41.9 FLUoxetine (PROZAC) 20 MG capsule     LORazepam (ATIVAN) 1 MG tablet     DISCONTINUED: LORazepam (ATIVAN) 1 MG tablet      11. Hypomagnesemia  E83.42 magnesium oxide (MAG-OX) 400 MG tablet      12. Pain medication agreement-nonopioid 9-23-21  Z02.89       13. Hypertension, unspecified type  I10 lisinopril-hydrochlorothiazide (ZESTORETIC) 20-12.5 MG tablet      14. Benign prostatic hyperplasia with lower urinary tract symptoms, symptom details unspecified  N40.1 tamsulosin (FLOMAX) 0.4 MG capsule          Medicare wellness: Discussed with patient that he is now 77 and with his advanced COPD his screening for colon cancer prostate cancer are optional and he would elect to decline screening.  Reviewed and updated past medical history family surgical history social history and medications.    Acute on chronic hypoxic respiratory failure, COPD: Reviewed his current regimen, continue Trelegy inhaler, duo nebs and albuterol inhaler as needed.  Refills were  "given.  Under good control today.  Remains on 4 L of supplemental oxygen.    Patient is a former smoker and has had pulmonary nodules in the past which have been followed and have been overall stable.  He had a CT scan of his chest when he broke ribs this summer which showed stable size nodule.  Defer further lung cancer screening until next year.  He also had a brother die of a AAA rupture.  Would like to be screened for AAA.    Anxiety, pain medication agreement: Patient utilizing Ativan for anxiety particularly after some of his nebulizers.  He has been on SSRI in the past.  We discussed starting Prozac to help limit his anxiety attacks and he signed a controlled substance agreement today in clinic.  He was given 90 tablets with 1 refill.  Further refills will need follow-up in clinic.  This was explained to the patient today.  Reviewed PDMP which shows that his former PCP had been prescribing this in the past and I will be the only prescriber for him going forward.    Hypertension at goal, refilled his Zestoretic.  Labs reviewed from most recent visit which are acceptable    BPH: Well-controlled with tamsulosin, continue refill given.      Patient has been advised of split billing requirements and indicates understanding: Yes    COUNSELING:  Reviewed preventive health counseling, as reflected in patient instructions       Consider AAA screening for ages 65-75 and smoking history       Regular exercise       Healthy diet/nutrition       Consider lung cancer screening for ages 55-80 years (77 for Medicare) and 20 pack-year smoking history         Colon cancer screening       Prostate cancer screening    Estimated body mass index is 26.27 kg/m  as calculated from the following:    Height as of 8/24/22: 1.727 m (5' 8\").    Weight as of 8/25/22: 78.4 kg (172 lb 12.8 oz).        He reports that he quit smoking about 6 years ago. His smoking use included cigarettes. He smoked an average of 1 pack per day. He has never " used smokeless tobacco.    Appropriate preventive services were discussed with this patient, including applicable screening as appropriate for cardiovascular disease, diabetes, osteopenia/osteoporosis, and glaucoma.  As appropriate for age/gender, discussed screening for colorectal cancer, prostate cancer, breast cancer, and cervical cancer. Checklist reviewing preventive services available has been given to the patient.      Counseling Resources:  ATP IV Guidelines  Pooled Cohorts Equation Calculator  Breast Cancer Risk Calculator  BRCA-Related Cancer Risk Assessment: FHS-7 Tool  FRAX Risk Assessment  ICSI Preventive Guidelines  Dietary Guidelines for Americans, 2010  USDA's MyPlate  ASA Prophylaxis  Lung CA Screening    Prince Proctor MD  [facility]

## 2022-11-25 ENCOUNTER — HOSPITAL ENCOUNTER (OUTPATIENT)
Dept: ULTRASOUND IMAGING | Facility: OTHER | Age: 77
Discharge: HOME | End: 2022-11-25
Attending: STUDENT IN AN ORGANIZED HEALTH CARE EDUCATION/TRAINING PROGRAM | Admitting: STUDENT IN AN ORGANIZED HEALTH CARE EDUCATION/TRAINING PROGRAM
Payer: COMMERCIAL

## 2022-11-25 DIAGNOSIS — Z82.49 FAMILY HISTORY OF ABDOMINAL AORTIC ANEURYSM (AAA): ICD-10-CM

## 2022-11-25 DIAGNOSIS — Z87.891 SMOKING HISTORY: ICD-10-CM

## 2022-11-25 PROCEDURE — 76706 US ABDL AORTA SCREEN AAA: CPT

## 2023-01-01 ENCOUNTER — NURSE TRIAGE (OUTPATIENT)
Dept: NURSING | Facility: CLINIC | Age: 78
End: 2023-01-01

## 2023-01-01 NOTE — TELEPHONE ENCOUNTER
Nurse Triage SBAR    Situation: Wife calling-pt present to give verbal consent to speak with wife and he was able to answer questions as well regarding tachycardia     Background: Pt is on chronic O2 and was just finishing a nebulizer when he checked his O2 with a pulse ox and noticed his HR was in the 150's for about 20 minutes before it went back down into the high 60's     Assessment: No difficulty breathing. Didn't feel like his heart was beating really fast, no chest pain, no history of a-fib     Protocol Recommended Disposition:   See HCP Within 4 Hours (Or PCP Triage)    Recommendation: Advised that he should go to ED today. Pt is agreeable to this however he would like to go a little later in the day. Advised as long as he goes in the next 4 hours that is okay but will need to go right away if pulse becomes elevated again or is having any other symptoms. Pt and wife verbalize understanding and are agreeable to plan       Reason for Disposition    [1] Heart beating very rapidly (e.g., > 140 / minute) AND [2] not present now  (Exception: during exercise)    Additional Information    Negative: Passed out (i.e., lost consciousness, collapsed and was not responding)    Negative: Shock suspected (e.g., cold/pale/clammy skin, too weak to stand, low BP, rapid pulse)    Negative: Difficult to awaken or acting confused (e.g., disoriented, slurred speech)    Negative: Visible sweat on face or sweat dripping down face    Negative: Unable to walk, or can only walk with assistance (e.g., requires support)    Negative: [1] Received SHOCK from implantable cardiac defibrillator AND [2] persisting symptoms (i.e., palpitations, lightheadedness)    Negative: [1] Dizziness, lightheadedness, or weakness AND [2] heart beating very rapidly (e.g., > 140 / minute)    Negative: [1] Dizziness, lightheadedness, or weakness AND [2] heart beating very slowly (e.g., < 50 / minute)    Negative: Sounds like a life-threatening emergency to  the triager    Negative: Chest pain    Negative: Implantable Cardiac Defibrillator (ICD) or a pacemaker symptoms or questions    Negative: Difficulty breathing    Negative: Dizziness, lightheadedness, or weakness    Negative: [1] Heart beating very rapidly (e.g., > 140 / minute) AND [2] present now  (Exception: during exercise)    Negative: Heart beating very slowly (e.g., < 50 / minute)  (Exception: athlete and heart rate normal for caller)    Negative: New or worsened shortness of breath with activity (dyspnea on exertion)    Negative: Patient sounds very sick or weak to the triager    Protocols used: HEART RATE AND HEARTBEAT [id number]

## 2023-01-03 ENCOUNTER — MYC MEDICAL ADVICE (OUTPATIENT)
Dept: INTERNAL MEDICINE | Facility: OTHER | Age: 78
End: 2023-01-03

## 2023-01-16 ENCOUNTER — APPOINTMENT (OUTPATIENT)
Dept: GENERAL RADIOLOGY | Facility: OTHER | Age: 78
End: 2023-01-16
Attending: FAMILY MEDICINE
Payer: COMMERCIAL

## 2023-01-16 ENCOUNTER — HOSPITAL ENCOUNTER (EMERGENCY)
Facility: OTHER | Age: 78
Discharge: HOME | End: 2023-01-17
Attending: FAMILY MEDICINE | Admitting: FAMILY MEDICINE
Payer: COMMERCIAL

## 2023-01-16 ENCOUNTER — NURSE TRIAGE (OUTPATIENT)
Dept: NURSING | Facility: CLINIC | Age: 78
End: 2023-01-16

## 2023-01-16 DIAGNOSIS — E86.0 DEHYDRATION: ICD-10-CM

## 2023-01-16 LAB
ANION GAP SERPL CALCULATED.3IONS-SCNC: 10 MMOL/L (ref 7–15)
BASOPHILS # BLD AUTO: 0 10E3/UL (ref 0–0.2)
BASOPHILS NFR BLD AUTO: 1 %
BUN SERPL-MCNC: 17.4 MG/DL (ref 8–23)
CALCIUM SERPL-MCNC: 8.7 MG/DL (ref 8.8–10.2)
CHLORIDE SERPL-SCNC: 103 MMOL/L (ref 98–107)
CREAT SERPL-MCNC: 1.59 MG/DL (ref 0.67–1.17)
DEPRECATED HCO3 PLAS-SCNC: 25 MMOL/L (ref 22–29)
EOSINOPHIL # BLD AUTO: 0.1 10E3/UL (ref 0–0.7)
EOSINOPHIL NFR BLD AUTO: 2 %
ERYTHROCYTE [DISTWIDTH] IN BLOOD BY AUTOMATED COUNT: 12.1 % (ref 10–15)
FLUAV RNA SPEC QL NAA+PROBE: NEGATIVE
FLUBV RNA RESP QL NAA+PROBE: NEGATIVE
GFR SERPL CREATININE-BSD FRML MDRD: 45 ML/MIN/1.73M2
GLUCOSE SERPL-MCNC: 78 MG/DL (ref 70–99)
HCT VFR BLD AUTO: 45.2 % (ref 40–53)
HGB BLD-MCNC: 15.7 G/DL (ref 13.3–17.7)
HOLD SPECIMEN: NORMAL
HOLD SPECIMEN: NORMAL
IMM GRANULOCYTES # BLD: 0.1 10E3/UL
IMM GRANULOCYTES NFR BLD: 1 %
LYMPHOCYTES # BLD AUTO: 1.6 10E3/UL (ref 0.8–5.3)
LYMPHOCYTES NFR BLD AUTO: 19 %
MCH RBC QN AUTO: 33.8 PG (ref 26.5–33)
MCHC RBC AUTO-ENTMCNC: 34.7 G/DL (ref 31.5–36.5)
MCV RBC AUTO: 97 FL (ref 78–100)
MONOCYTES # BLD AUTO: 1 10E3/UL (ref 0–1.3)
MONOCYTES NFR BLD AUTO: 12 %
NEUTROPHILS # BLD AUTO: 5.8 10E3/UL (ref 1.6–8.3)
NEUTROPHILS NFR BLD AUTO: 65 %
NRBC # BLD AUTO: 0 10E3/UL
NRBC BLD AUTO-RTO: 0 /100
PLATELET # BLD AUTO: 183 10E3/UL (ref 150–450)
POTASSIUM SERPL-SCNC: 4.5 MMOL/L (ref 3.4–5.3)
RBC # BLD AUTO: 4.64 10E6/UL (ref 4.4–5.9)
RSV RNA SPEC NAA+PROBE: NEGATIVE
SARS-COV-2 RNA RESP QL NAA+PROBE: NEGATIVE
SODIUM SERPL-SCNC: 138 MMOL/L (ref 136–145)
TROPONIN T SERPL HS-MCNC: 32 NG/L
TROPONIN T SERPL HS-MCNC: 36 NG/L
WBC # BLD AUTO: 8.7 10E3/UL (ref 4–11)

## 2023-01-16 PROCEDURE — 36415 COLL VENOUS BLD VENIPUNCTURE: CPT | Performed by: FAMILY MEDICINE

## 2023-01-16 PROCEDURE — 85025 COMPLETE CBC W/AUTO DIFF WBC: CPT | Performed by: FAMILY MEDICINE

## 2023-01-16 PROCEDURE — 84484 ASSAY OF TROPONIN QUANT: CPT | Performed by: FAMILY MEDICINE

## 2023-01-16 PROCEDURE — 94640 AIRWAY INHALATION TREATMENT: CPT

## 2023-01-16 PROCEDURE — 93010 ELECTROCARDIOGRAM REPORT: CPT | Performed by: STUDENT IN AN ORGANIZED HEALTH CARE EDUCATION/TRAINING PROGRAM

## 2023-01-16 PROCEDURE — 96361 HYDRATE IV INFUSION ADD-ON: CPT | Performed by: FAMILY MEDICINE

## 2023-01-16 PROCEDURE — 99285 EMERGENCY DEPT VISIT HI MDM: CPT | Mod: 25 | Performed by: FAMILY MEDICINE

## 2023-01-16 PROCEDURE — 258N000003 HC RX IP 258 OP 636: Performed by: FAMILY MEDICINE

## 2023-01-16 PROCEDURE — 80048 BASIC METABOLIC PNL TOTAL CA: CPT | Performed by: FAMILY MEDICINE

## 2023-01-16 PROCEDURE — 93005 ELECTROCARDIOGRAM TRACING: CPT | Performed by: FAMILY MEDICINE

## 2023-01-16 PROCEDURE — 71045 X-RAY EXAM CHEST 1 VIEW: CPT

## 2023-01-16 PROCEDURE — 96360 HYDRATION IV INFUSION INIT: CPT | Performed by: FAMILY MEDICINE

## 2023-01-16 PROCEDURE — 99283 EMERGENCY DEPT VISIT LOW MDM: CPT | Performed by: FAMILY MEDICINE

## 2023-01-16 PROCEDURE — 87637 SARSCOV2&INF A&B&RSV AMP PRB: CPT | Performed by: FAMILY MEDICINE

## 2023-01-16 PROCEDURE — 250N000009 HC RX 250: Performed by: FAMILY MEDICINE

## 2023-01-16 PROCEDURE — 999N000157 HC STATISTIC RCP TIME EA 10 MIN

## 2023-01-16 RX ORDER — IPRATROPIUM BROMIDE AND ALBUTEROL SULFATE 2.5; .5 MG/3ML; MG/3ML
3 SOLUTION RESPIRATORY (INHALATION) ONCE
Status: COMPLETED | OUTPATIENT
Start: 2023-01-16 | End: 2023-01-16

## 2023-01-16 RX ADMIN — SODIUM CHLORIDE 1000 ML: 9 INJECTION, SOLUTION INTRAVENOUS at 21:40

## 2023-01-16 RX ADMIN — IPRATROPIUM BROMIDE AND ALBUTEROL SULFATE 3 ML: .5; 2.5 SOLUTION RESPIRATORY (INHALATION) at 20:12

## 2023-01-16 RX ADMIN — SODIUM CHLORIDE 1000 ML: 9 INJECTION, SOLUTION INTRAVENOUS at 20:33

## 2023-01-16 ASSESSMENT — ACTIVITIES OF DAILY LIVING (ADL)
ADLS_ACUITY_SCORE: 35
ADLS_ACUITY_SCORE: 35

## 2023-01-17 ENCOUNTER — VIRTUAL VISIT (OUTPATIENT)
Dept: PULMONOLOGY | Facility: CLINIC | Age: 78
End: 2023-01-17
Attending: INTERNAL MEDICINE
Payer: COMMERCIAL

## 2023-01-17 VITALS
OXYGEN SATURATION: 96 % | TEMPERATURE: 97.5 F | HEIGHT: 66 IN | HEART RATE: 72 BPM | DIASTOLIC BLOOD PRESSURE: 80 MMHG | SYSTOLIC BLOOD PRESSURE: 139 MMHG | RESPIRATION RATE: 19 BRPM | BODY MASS INDEX: 28.93 KG/M2 | WEIGHT: 180 LBS

## 2023-01-17 DIAGNOSIS — J43.2 CENTRILOBULAR EMPHYSEMA (H): Primary | ICD-10-CM

## 2023-01-17 LAB
ATRIAL RATE - MUSE: 96 BPM
DIASTOLIC BLOOD PRESSURE - MUSE: NORMAL MMHG
INTERPRETATION ECG - MUSE: NORMAL
P AXIS - MUSE: 94 DEGREES
PR INTERVAL - MUSE: 128 MS
QRS DURATION - MUSE: 80 MS
QT - MUSE: 240 MS
QTC - MUSE: 343 MS
R AXIS - MUSE: -67 DEGREES
SYSTOLIC BLOOD PRESSURE - MUSE: NORMAL MMHG
T AXIS - MUSE: 23 DEGREES
VENTRICULAR RATE- MUSE: 123 BPM

## 2023-01-17 PROCEDURE — G0463 HOSPITAL OUTPT CLINIC VISIT: HCPCS | Mod: PN,GT | Performed by: INTERNAL MEDICINE

## 2023-01-17 PROCEDURE — 99214 OFFICE O/P EST MOD 30 MIN: CPT | Mod: 95 | Performed by: INTERNAL MEDICINE

## 2023-01-17 NOTE — TELEPHONE ENCOUNTER
"Patient's wife Gillian (\"Nguyễn\") calling. Name on [facility] form is filled out with patient's name but states \"wife\" and pt having emergency symptoms, so will triage.    Wife states Brent's HR has been \"bouncing all over\" for the last 30 minutes. He is also struggling to breathe. Has a hx of COPD and is on 6L of O2.     Says his HR goes from the low 100's up to the 150's. His 02 sats were in the 80's, but are now 94. States that he is \"huffing and puffing\" to breathe and he says he feels dizzy and lightheaded. She tried to get a BP on him, but the monitor won't read. She tried to take her own BP to make sure the machine was working and her BP was 120/80.     Protocol recommends call 911 now. Informed Nguyễn she needs to call 911 for an ambulance. She stated she was comfortable doing this and would call now.    Piedad Braden, RN, BSN  [facility]   Triage Nurse Advisor      Reason for Disposition    [1] Dizziness, lightheadedness, or weakness AND [2] heart beating very rapidly (e.g., > 140 / minute)    Additional Information    Negative: Passed out (i.e., lost consciousness, collapsed and was not responding)    Negative: Shock suspected (e.g., cold/pale/clammy skin, too weak to stand, low BP, rapid pulse)    Negative: Difficult to awaken or acting confused (e.g., disoriented, slurred speech)    Negative: Visible sweat on face or sweat dripping down face    Negative: Unable to walk, or can only walk with assistance (e.g., requires support)    Negative: [1] Received SHOCK from implantable cardiac defibrillator AND [2] persisting symptoms (i.e., palpitations, lightheadedness)    Protocols used: HEART RATE AND HEARTBEAT [id number]      "

## 2023-01-17 NOTE — ED NOTES
"/82   Pulse (!) 134   Temp 97.5  F (36.4  C) (Tympanic)   Resp 20   Ht 1.676 m (5' 6\")   Wt 81.6 kg (180 lb)   SpO2 95%   BMI 29.05 kg/m      HR sustaining 120-140.  Pt asymptomatic resting in room.  O2 stable on 4L.  Cammie Knox RN.............................1/16/2023 9:31 PM    "

## 2023-01-17 NOTE — PROGRESS NOTES
Brent is a 77 year old who is being evaluated via a billable video visit.      How would you like to obtain your AVS? MyChart  If the video visit is dropped, the invitation should be resent by: Text to cell phone: [phone]  Will anyone else be joining your video visit? Yes: Gillian - wife. How would they like to receive their invitation? Other e-mail: in person        Video-Visit Details    Type of service:  Video Visit   Video Start Time:2:30 pm  Video End Time:2:45 pm    Originating Location (pt. Location): Home    Distant Location (provider location):  On-site  Platform used for Video Visit: [email or web address] [facility]  Interventional Pulmonary Clinic Virtual Visit Note    January 17, 2023    Chief complaint:  Brent Villagomez is a 77 year old male seen for   Chief Complaint   Patient presents with     Oncology Clinic Visit     Follow up - er visit last night, dehydration, low blood pressure, inconsistent heart rate, kidney function       Reason for clinic visit / Chief complaint:   Severe COPD     Assessment and Plan:  # Severe COPD, evaluation for lung volume reduction.  Patient was sent by his pulmonologist for further evaluation of or his candidacy for bronchoscopic lung volume reduction.  He has had COPD at least for 10 to 15 years.  He has been on Trelegy and nebulizer (albuterol q4 hours).  He is also on home oxygen treatment as below.  We discussed bronchoscopic lung volume reduction, its pros, cons and potential (even life threatening) complications related to the procedure.  6-minute walk test (borderline at 560 ft), comprehensive PFTs are done (borderline DLCO at 23% of predicted) and aceceptable and [facility] select CT chest done in August 2021 is acceptable for ROSIE, see picture below.  I indicated my concerns about having 2 criteria being borderline (DLCO and 6-minute walk test) as well as patient's location ([place], [place]) in case he has emergency such as tension pneumothorax  after the procedure/discharged.  The decision was not to do the BLVR.  I have reviewed his EKG as well as his CT scans. He has been followed in [place] by Dr. Riojas from pulmonary. Advised to look into pulm rehab.      # Recent hospitalization (first time)--end of 2021-- for copd exacerbation. Off prednisone.     # Former smoker     # Hypertension on medications        History of Present Illness:  76 years old gentleman with known severe COPD has been on inhalers/nebulizer with limited physical capacity.  He has had PFTs done in April 2021 revealing FEV1 of 1.2 L.  His main symptom is dyspnea on exertion.  He appears to be having exacerbations averaging once a year but he has never been in the hospital or emergency room because of COPD in the past. Recent PFTs from 8/18/2021 showed FEV1 of 1.27L (44%)--severe obstruction with % increase following administration of a bronchodilator, DLCOc 23% and TLC of 119% of predicted. Same day, 6MWT showed no desaturations, covering 712 feet     HOT 2 lpm/NC sitting, 4 lpm on exertion  trelogy inhaler  Nebs--q4 hours  Prednisone use-- once a year. First hospitalization for copd exacerbation 4 month ago  Smoker 50 pack year, quit 5 years ago      Allergies   Allergen Reactions     No Clinical Screening - See Comments      Dust and pollen        Past Medical History:   Diagnosis Date     Chronic hepatitis C (H)     No Comments Provided     Disorder of porphyrin metabolism (H)     No Comments Provided     Lumbar sprain     No Comments Provided     Other and unspecified alcohol dependence, unspecified drinking behavior     No Comments Provided     Other specified chronic obstructive airways disease     No Comments Provided     Personal history of other specified diseases     occupational, hammering     Polycythemia, secondary     No Comments Provided     Unspecified visual loss     near and far        Past Surgical History:   Procedure Laterality Date     CLOSED REDUCTION ANKLE       Fx ankle with plate        Social History     Socioeconomic History     Marital status:      Spouse name: Not on file     Number of children: Not on file     Years of education: Not on file     Highest education level: Not on file   Occupational History     Not on file   Tobacco Use     Smoking status: Former     Packs/day: 1.00     Types: Cigarettes     Quit date: 2016     Years since quittin.3     Smokeless tobacco: Never   Vaping Use     Vaping Use: Never used   Substance and Sexual Activity     Alcohol use: No     Drug use: Never     Sexual activity: Not Currently   Other Topics Concern     Not on file   Social History Narrative    Living [place] [address].  Lives with partner.  2 children grown.  Disabled from COPD, Hepatitis C.    Likes fishing, doesn't do much due to COPD anymore. Previously a flores.      Social Determinants of Health     Financial Resource Strain: Not on file   Food Insecurity: Not on file   Transportation Needs: Not on file   Physical Activity: Not on file   Stress: Not on file   Social Connections: Not on file   Intimate Partner Violence: Not on file   Housing Stability: Not on file        Family History   Problem Relation Age of Onset     Heart Disease Mother         Heart Disease,valve disease/rheumatic     Other - See Comments Father         sudden death age 70     Arthritis Father         Arthritis     Chronic Obstructive Pulmonary Disease Brother      Cancer Brother         Cancer,Skin cancer     Abdominal Aortic Aneurysm Brother         Immunization History   Administered Date(s) Administered     COVID-19 Vaccine 12+ (Pfizer ) 2022     COVID-19 Vaccine 12+ (Pfizer) 2021, 2021, 2021     COVID-19 Vaccine Bivalent Booster 12+ (Pfizer) 10/13/2022     Flu, Unspecified 2004, 2010     U0g7-14 Novel Flu P-free 2010     HepB-Adult 2002, 10/08/2015, 2017     Influenza (High Dose) 3 valent  vaccine 10/30/2012, 10/20/2014, 12/03/2015, 10/20/2016, 09/22/2017, 11/27/2018, 09/22/2021     Influenza (IIV3) PF 10/23/2007, 11/19/2008, 09/15/2009, 09/27/2010, 10/11/2011, 10/14/2013, 11/27/2018     Influenza Vaccine 65+ (Fluzone HD) 09/22/2020, 09/22/2021, 10/13/2022     Influenza, Whole Virus 11/17/2005, 10/23/2006     Influenza,INJ,MDCK,PF,Quad >6mo(Flucelvax) 10/07/2019     Pneumo Conj 13-V (2010&after) 10/08/2015     Pneumococcal 23 valent 01/28/2003, 03/27/2012     TD (ADULT, 7+) 08/02/2022     Tdap (Adacel,Boostrix) 06/22/2000, 02/14/2012     Zoster vaccine recombinant adjuvanted (SHINGRIX) 10/07/2019, 12/14/2019     Zoster vaccine, live 10/30/2012       Current Outpatient Medications   Medication Sig     albuterol (PROAIR HFA/PROVENTIL HFA/VENTOLIN HFA) 108 (90 Base) MCG/ACT inhaler Inhale 2 puffs into the lungs every 6 hours as needed for shortness of breath / dyspnea     amLODIPine (NORVASC) 5 MG tablet Take 2.5 mg by mouth daily      aspirin EC 81 MG EC tablet Take 81 mg by mouth daily      cholecalciferol (VITAMIN D3) 25 mcg (1000 units) capsule Take 2 capsules by mouth daily     FLUoxetine (PROZAC) 20 MG capsule Take 1 capsule (20 mg) by mouth daily     Fluticasone-Umeclidin-Vilanterol (TRELEGY ELLIPTA) 100-62.5-25 MCG/INH oral inhaler Inhale 1 puff into the lungs daily     ipratropium - albuterol 0.5 mg/2.5 mg/3 mL (DUONEB) 0.5-2.5 (3) MG/3ML neb solution Take 1 vial (3 mLs) by nebulization every 4 hours as needed for shortness of breath / dyspnea or wheezing     lisinopril-hydrochlorothiazide (ZESTORETIC) 20-12.5 MG tablet Take 1 tablet by mouth daily     loratadine (CLARITIN) 10 MG tablet Take 10 mg by mouth 2 times daily      LORazepam (ATIVAN) 1 MG tablet Take 1 tablet (1 mg) by mouth 3 times daily as needed for anxiety     magnesium oxide (MAG-OX) 400 MG tablet Take 1 tablet (400 mg) by mouth daily     montelukast (SINGULAIR) 10 MG tablet Take 10 mg by mouth At Bedtime      multivitamin,  therapeutic (THERA-VIT) TABS tablet Take 1 tablet by mouth daily     Respiratory Therapy Supplies (INNOSPIRE REPLACEMENT FILTER[place] [facility]      Respiratory Therapy Supplies (NEBULIZER/TUBING/MOUTHPIECE) KIT Use when taking nebs     tamsulosin (FLOMAX) 0.4 MG capsule Take 2 capsules (0.8 mg) by mouth daily     No current facility-administered medications for this visit.        Review of Systems:  I have done 10 points of review systems and all negative except for those mentioned in HPI    Physical examination  Constitutional: Oriented, not in distress  Vitals: unavailable  Eyes: No icterus, nystagmus, pupils isocoric  Head and neck: normal posture and movements  Respiratory: Normal tidal breathing, no shortness of breath, no audible wheezing or stridor over the phone or video visit  Musculoskeletal: Normal muscle mass, no deformity on hands/fingers  Integumentary:  No rash on visible skin areas on video visit  Neurological: Alert, orientedx3, no motor deficits  Psychiatric:  Mood and affect are appropriate with insight into his/her medical condition    Data:  Lab Results   Component Value Date    WBC 8.7 01/16/2023     Lab Results   Component Value Date    RBC 4.64 01/16/2023     Lab Results   Component Value Date    HGB 15.7 01/16/2023     Lab Results   Component Value Date    HCT 45.2 01/16/2023     Lab Results   Component Value Date    MCV 97 01/16/2023     Lab Results   Component Value Date    MCH 33.8 01/16/2023     Lab Results   Component Value Date    MCHC 34.7 01/16/2023     Lab Results   Component Value Date    RDW 12.1 01/16/2023     Lab Results   Component Value Date     01/16/2023       Lab Results   Component Value Date     01/16/2023      Lab Results   Component Value Date    POTASSIUM 4.5 01/16/2023    POTASSIUM 4.1 08/24/2022     Lab Results   Component Value Date    CHLORIDE 103 01/16/2023    CHLORIDE 101 08/24/2022     Lab Results   Component Value Date    MARCY 8.7 01/16/2023     Lab Results    Component Value Date    CO2 25 01/16/2023    CO2 24 08/24/2022     Lab Results   Component Value Date    BUN 17.4 01/16/2023    BUN 29 08/24/2022     Lab Results   Component Value Date    CR 1.59 01/16/2023     Lab Results   Component Value Date    GLC 78 01/16/2023     08/24/2022         LARISA Cole MD

## 2023-01-17 NOTE — ED TRIAGE NOTES
Pt arrives via EMS. Pt was cooking dinner and felt his heart racing. Pt has h/o emphazema. Pt has IV established. IV fluids given Afib, on the monitor. Pt denies h/o afib.    Virginia Rae RN on 1/16/2023 at 8:07 PM       Triage Assessment     Row Name 01/16/23 2006       Triage Assessment (Adult)    Airway WDL WDL       Skin Circulation/Temperature WDL    Skin Circulation/Temperature WDL WDL       Cognitive/Neuro/Behavioral WDL    Cognitive/Neuro/Behavioral WDL WDL

## 2023-01-17 NOTE — ED PROVIDER NOTES
History     Chief Complaint   Patient presents with     Palpitations     The history is provided by the patient, the EMS personnel and medical records.     Brent Villagomez is a 77 year old male here by ambulance from home after he felt dizzy and had increased pulse at about 5:30 or 6 PM today.  His pulse oximeter at home was telling him he had a pulse between 40 and 160 bpm. He is on oxygen at home (4 L) and did not feel SOB.  He had a negative COVID test two days ago. No change in his chronic cough, no fever.     He has a history of lung disease including stage 3 oxygen dependent COPD, smoking, history of pneumonia (on albuterol PRN, Trelegy Ellipta, Duo Neb, Singulair, on 4 L oxygen at home), HTN (on amlodipine, lisinopril, hydrochlorothiazide- last ECHO August 2022 with high RA pressure, remainder normal).     Allergies:  Allergies   Allergen Reactions     No Clinical Screening - See Comments      Dust and pollen       Problem List:    Patient Active Problem List    Diagnosis Date Noted     Closed fracture of multiple ribs 08/12/2022     Priority: Medium     Syncope 08/12/2022     Priority: Medium     Bradycardia 08/12/2022     Priority: Medium     Abdominal wall pain in left upper quadrant 08/12/2022     Priority: Medium     Chest wall contusion, left, initial encounter 08/12/2022     Priority: Medium     Pneumonia of left lower lobe due to infectious organism 08/12/2022     Priority: Medium     Alcohol dependence in remission (H) 09/23/2021     Priority: Medium     Pain medication agreement-nonopioid 9-23-21 09/23/2021     Priority: Medium     Other long term (current) drug therapy 09/23/2021     Priority: Medium     Acute on chronic respiratory failure with hypoxia (H) 09/12/2021     Priority: Medium     Tachypnea 09/11/2021     Priority: Medium     COPD exacerbation (H) 09/11/2021     Priority: Medium     Community acquired pneumonia 09/11/2021     Priority: Medium     Health care directive on file  2020     Priority: Medium     Benign prostatic hyperplasia with lower urinary tract symptoms, symptom details unspecified 2019     Priority: Medium     Multiple pulmonary nodules 2018     Priority: Medium     COPD with emphysema (H) 2015     Priority: Medium     PCT (porphyria cutanea tarda) (H) 2012     Priority: Medium     Formatting of this note might be different from the original.  2000 dx Dr Hernandez felt due to Hepatitis C liver disease; treated with iron unloading phlebotomies       Colon polyps 2012     Priority: Medium     Formatting of this note might be different from the original.  Tubular adenomas cecum and descending colon in .  2008 colonoscopy and 4 polyps removed by Dr Bob Raya at [facility] in [place], [place] - path = tubular adenomas.  Colonoscopy 2013 Dr Bonds - 2 TAs.       Family history of abdominal aortic aneurysm 2012     Priority: Medium     Formatting of this note might be different from the original.  Brother  age 64.  Abd aorta screening 2012 normal.       Positive PPD, treated 2012     Priority: Medium     Formatting of this note might be different from the original.  9 months INH preventive treatment in [facility] in his early 30s.       Stage 3 severe COPD by GOLD classification (H) 2012     Priority: Medium     Obesity 09/15/2009     Priority: Medium     Lichenification 2008     Priority: Medium     Benign neoplasm of colon 2008     Priority: Medium     Polycythemia, secondary 2008     Priority: Medium     Major depressive disorder, recurrent episode, moderate (H) 2007     Priority: Medium     Osteoarthritis, hand 2006     Priority: Medium     Tobacco user 05/10/2006     Priority: Medium     Degeneration of lumbar or lumbosacral intervertebral disc 2006     Priority: Medium     Actinic keratosis 2005     Priority: Medium     Obstructive chronic  bronchitis without exacerbation (H) 2003     Priority: Medium     Disorder of porphyrin metabolism (H) 2003     Priority: Medium     Acute hepatitis C virus infection 2000     Priority: Medium     Hypertension 2000     Priority: Medium        Past Medical History:    Past Medical History:   Diagnosis Date     Chronic hepatitis C (H)      Disorder of porphyrin metabolism (H)      Lumbar sprain      Other and unspecified alcohol dependence, unspecified drinking behavior      Other specified chronic obstructive airways disease      Personal history of other specified diseases      Polycythemia, secondary      Unspecified visual loss        Past Surgical History:    Past Surgical History:   Procedure Laterality Date     CLOSED REDUCTION ANKLE      Fx ankle with plate       Family History:    Family History   Problem Relation Age of Onset     Heart Disease Mother         Heart Disease,valve disease/rheumatic     Other - See Comments Father         sudden death age 70     Arthritis Father         Arthritis     Chronic Obstructive Pulmonary Disease Brother      Cancer Brother         Cancer,Skin cancer     Abdominal Aortic Aneurysm Brother        Social History:  Marital Status:   [2]  Social History     Tobacco Use     Smoking status: Former     Packs/day: 1.00     Types: Cigarettes     Quit date: 2016     Years since quittin.3     Smokeless tobacco: Never   Vaping Use     Vaping Use: Never used   Substance Use Topics     Alcohol use: No     Drug use: Never        Medications:    albuterol (PROAIR HFA/PROVENTIL HFA/VENTOLIN HFA) 108 (90 Base) MCG/ACT inhaler  amLODIPine (NORVASC) 5 MG tablet  aspirin EC 81 MG EC tablet  cholecalciferol (VITAMIN D3) 25 mcg (1000 units) capsule  FLUoxetine (PROZAC) 20 MG capsule  Fluticasone-Umeclidin-Vilanterol (TRELEGY ELLIPTA) 100-62.5-25 MCG/INH oral inhaler  ipratropium - albuterol 0.5 mg/2.5 mg/3 mL (DUONEB) 0.5-2.5 (3) MG/3ML neb  "solution  lisinopril-hydrochlorothiazide (ZESTORETIC) 20-12.5 MG tablet  loratadine (CLARITIN) 10 MG tablet  LORazepam (ATIVAN) 1 MG tablet  magnesium oxide (MAG-OX) 400 MG tablet  montelukast (SINGULAIR) 10 MG tablet  multivitamin, therapeutic (THERA-VIT) TABS tablet  Respiratory Therapy Supplies (INNOSPIRE REPLACEMENT FILTER) [facility]  Respiratory Therapy Supplies (NEBULIZER/TUBING/MOUTHPIECE) KIT  tamsulosin (FLOMAX) 0.4 MG capsule          Review of Systems    Physical Exam   BP: (!) 143/94  Pulse: (!) 130  Temp: 97.5  F (36.4  C)  Resp: 24  Height: 167.6 cm (5' 6\")  Weight: 81.6 kg (180 lb)  SpO2: 94 %      Physical Exam    EKG: Sinus rhythm with PVC's, Q waves in III, aVF and this looks different that the previous in August 2022 (no PVC's seen on the August tracing).     Results for orders placed or performed during the hospital encounter of 01/16/23 (from the past 24 hour(s))   CBC with platelets differential    Narrative    The following orders were created for panel order CBC with platelets differential.  Procedure                               Abnormality         Status                     ---------                               -----------         ------                     CBC with platelets and d...[[id number]]  Abnormal            Final result                 Please view results for these tests on the individual orders.   Basic metabolic panel   Result Value Ref Range    Sodium 138 136 - 145 mmol/L    Potassium 4.5 3.4 - 5.3 mmol/L    Chloride 103 98 - 107 mmol/L    Carbon Dioxide (CO2) 25 22 - 29 mmol/L    Anion Gap 10 7 - 15 mmol/L    Urea Nitrogen 17.4 8.0 - 23.0 mg/dL    Creatinine 1.59 (H) 0.67 - 1.17 mg/dL    Calcium 8.7 (L) 8.8 - 10.2 mg/dL    Glucose 78 70 - 99 mg/dL    GFR Estimate 45 (L) >60 mL/min/1.73m2   Troponin T, High Sensitivity   Result Value Ref Range    Troponin T, High Sensitivity 36 (H) <=22 ng/L   CBC with platelets and differential   Result Value Ref Range    WBC Count 8.7 4.0 - 11.0 " 10e3/uL    RBC Count 4.64 4.40 - 5.90 10e6/uL    Hemoglobin 15.7 13.3 - 17.7 g/dL    Hematocrit 45.2 40.0 - 53.0 %    MCV 97 78 - 100 fL    MCH 33.8 (H) 26.5 - 33.0 pg    MCHC 34.7 31.5 - 36.5 g/dL    RDW 12.1 10.0 - 15.0 %    Platelet Count 183 150 - 450 10e3/uL    % Neutrophils 65 %    % Lymphocytes 19 %    % Monocytes 12 %    % Eosinophils 2 %    % Basophils 1 %    % Immature Granulocytes 1 %    NRBCs per 100 WBC 0 <1 /100    Absolute Neutrophils 5.8 1.6 - 8.3 10e3/uL    Absolute Lymphocytes 1.6 0.8 - 5.3 10e3/uL    Absolute Monocytes 1.0 0.0 - 1.3 10e3/uL    Absolute Eosinophils 0.1 0.0 - 0.7 10e3/uL    Absolute Basophils 0.0 0.0 - 0.2 10e3/uL    Absolute Immature Granulocytes 0.1 <=0.4 10e3/uL    Absolute NRBCs 0.0 10e3/uL   Asymptomatic Influenza A/B & SARS-CoV2 (COVID-19) Virus PCR Multiplex Nose    Specimen: Nose; Swab   Result Value Ref Range    Influenza A PCR Negative Negative    Influenza B PCR Negative Negative    RSV PCR Negative Negative    SARS CoV2 PCR Negative Negative    Narrative    Testing was performed using the Xpert Xpress CoV2/Flu/RSV Assay on the [employer] GeneXpert Instrument. This test should be ordered for the detection of SARS-CoV-2 and influenza viruses in individuals who meet clinical and/or epidemiological criteria. Test performance is unknown in asymptomatic patients. This test is for in vitro diagnostic use under the FDA EUA for laboratories certified under CLIA to perform high or moderate complexity testing. This test has not been FDA cleared or approved. A negative result does not rule out the presence of PCR inhibitors in the specimen or target RNA in concentration below the limit of detection for the assay. If only one viral target is positive but coinfection with multiple targets is suspected, the sample should be re-tested with another FDA cleared, approved, or authorized test, if coinfection would change clinical management. This test was validated by the [facility]. These laboratories are certified under the Clinical Laboratory Improvement Amendments of 1988 (CLIA-88) as qualified to perform high complexity laboratory testing.   Extra Tube    Narrative    The following orders were created for panel order Extra Tube.  Procedure                               Abnormality         Status                     ---------                               -----------         ------                     Extra Blue Top Tube[[id number]]                              Final result               Extra Red Top Tube[[id number]]                               Final result                 Please view results for these tests on the individual orders.   Extra Blue Top Tube   Result Value Ref Range    Hold Specimen JIC    Extra Red Top Tube   Result Value Ref Range    Hold Specimen JIC    XR Chest Port 1 View    Narrative    Procedure:XR CHEST PORT 1 VIEW    Clinical history:Male, 77 years, heart palpitations    Technique: Single view was obtained.    Comparison: 8/24/2022    Findings: The cardiac silhouette is normal. The pulmonary vasculature  is normal.    The lungs are similar in appearance. The lungs remain hyperinflated  with interstitial thickening and scattered areas of  scarring/atelectasis with a predominance in the mid and lower lung  zones. Bony structures are unremarkable.      Impression    Impression:   No acute abnormality.     The lungs are similar in appearance with persistent hyperinflation and  scattered areas of scarring/atelectasis.    BRIAN BERMUDEZ MD         SYSTEM ID:  [id number]   Troponin T, High Sensitivity   Result Value Ref Range    Troponin T, High Sensitivity 32 (H) <=22 ng/L       Medications   ipratropium - albuterol 0.5 mg/2.5 mg/3 mL (DUONEB) neb solution 3 mL (3 mLs Nebulization Given 1/16/23 2012)   0.9% sodium chloride BOLUS (0 mLs Intravenous Stopped 1/16/23 2140)   0.9% sodium chloride BOLUS (0 mLs Intravenous Stopped 1/16/23 2244)       Assessments &  "Plan (with Medical Decision Making)  Brent Villagomez is a 77 year old male here by ambulance from home after he felt dizzy and had increased pulse at about 5:30 or 6 PM today.  His pulse oximeter at home was telling him he had a pulse between 40 and 160 bpm. He is on oxygen at home (4 L) and did not feel SOB.  He had a negative COVID test two days ago. No change in his chronic cough, no fever.  He has a history of lung disease including stage 3 oxygen dependent COPD, smoking, history of pneumonia (on albuterol PRN, Trelegy Ellipta, Duo Neb, Singulair, on 4 L oxygen at home), HTN (on amlodipine, lisinopril, hydrochlorothiazide- last ECHO August 2022 with high RA pressure, remainder normal).  VS in the ED on 4 L oxygen by NC:    Patient Vitals for the past 24 hrs:   BP Temp Temp src Pulse Resp SpO2 Height Weight   01/16/23 2245 126/82 -- -- 75 19 95 % -- --   01/16/23 2230 138/88 -- -- 76 16 95 % -- --   01/16/23 2215 (!) 118/91 -- -- 85 18 94 % -- --   01/16/23 2200 (!) 128/91 -- -- 79 22 94 % -- --   01/16/23 2145 109/74 -- -- 84 (!) 35 94 % -- --   01/16/23 2130 120/89 -- -- 80 23 95 % -- --   01/16/23 2115 106/82 -- -- (!) 134 20 95 % -- --   01/16/23 2100 106/89 -- -- 106 14 94 % -- --   01/16/23 2045 114/82 -- -- (!) 131 20 96 % -- --   01/16/23 2030 (!) 136/93 -- -- (!) 121 28 95 % -- --   01/16/23 2015 109/88 -- -- (!) 131 17 97 % -- --   01/16/23 2012 -- -- -- -- -- 94 % -- --   01/16/23 2008 (!) 143/94 97.5  F (36.4  C) Tympanic (!) 130 24 94 % 1.676 m (5' 6\") 81.6 kg (180 lb)   Exam shows wheezes in the upper lung fields, normal heart sounds, normal pulses and no LE edema. He has normal mentation and is not  In distress. EKG shows some PVC's which are new since August 2022 but otherwise stable.  We gave a Duo Neb.   8:27 PM  His pulse went from 60 to 140 while we were monitoring him. I went in to see him- he was asymptomatic.  Labs with CBC normal, BMP with Cr 1.59, troponin 36, 4 Plex negative.  Chest xray " stable. We will repeat the bolus of NS. His repeat troponin is 32, so improved. He has not had chest pain the whole time.   11:51 PM  After two liters of NS he feels better and his VS are more stable.  We did stand him at bedside and he felt better. He cannot walk far on a normal day due to poor respiratory reserve.      I have reviewed the nursing notes.    I have reviewed the findings, diagnosis, plan and need for follow up with the patient.       Medical Decision Making  The patient presented with a problem that is an acute illness with systemic symptoms.    The patient's evaluation involved:  an assessment requiring an independent historian (see separate area of note for details)  ordering and review of 3+ test(s) (see separate area of note for details)    The patient's management involved only simple and very low risk treatment.      Final diagnoses:   Dehydration - felt dizzy and had a rapid pulse on arrival       1/16/2023   [facility] AND [facility], Natanael Riley MD  01/16/23 4630

## 2023-01-17 NOTE — LETTER
1/17/2023       RE: Brent Villagomez  [address]  [facility] [address]     Dear Colleague,    Thank you for referring your patient, Brent Villagomez[facility]. Please see a copy of my visit note below.      [facility]  Interventional Pulmonary Clinic Virtual Visit Note    January 17, 2023    Chief complaint:  Brent Villagomez is a 77 year old male seen for   Chief Complaint   Patient presents with     Oncology Clinic Visit     Follow up - er visit last night, dehydration, low blood pressure, inconsistent heart rate, kidney function       Reason for clinic visit / Chief complaint:   Severe COPD     Assessment and Plan:  # Severe COPD, evaluation for lung volume reduction.  Patient was sent by his pulmonologist for further evaluation of or his candidacy for bronchoscopic lung volume reduction.  He has had COPD at least for 10 to 15 years.  He has been on Trelegy and nebulizer (albuterol q4 hours).  He is also on home oxygen treatment as below.  We discussed bronchoscopic lung volume reduction, its pros, cons and potential (even life threatening) complications related to the procedure.  6-minute walk test (borderline at 560 ft), comprehensive PFTs are done (borderline DLCO at 23% of predicted) and aceceptable and [facility] select CT chest done in August 2021 is acceptable for ROSIE, see picture below.  I indicated my concerns about having 2 criteria being borderline (DLCO and 6-minute walk test) as well as patient's location ([place], [place]) in case he has emergency such as tension pneumothorax after the procedure/discharged.  The decision was not to do the BLVR.  I have reviewed his EKG as well as his CT scans. He has been followed in [place] by Dr. Riojas from pulmonary. Advised to look into pulm rehab.      # Recent hospitalization (first time)--end of 2021-- for copd exacerbation. Off prednisone.     # Former  smoker     # Hypertension on medications        History of Present Illness:  76 years old gentleman with known severe COPD has been on inhalers/nebulizer with limited physical capacity.  He has had PFTs done in April 2021 revealing FEV1 of 1.2 L.  His main symptom is dyspnea on exertion.  He appears to be having exacerbations averaging once a year but he has never been in the hospital or emergency room because of COPD in the past. Recent PFTs from 8/18/2021 showed FEV1 of 1.27L (44%)--severe obstruction with % increase following administration of a bronchodilator, DLCOc 23% and TLC of 119% of predicted. Same day, 6MWT showed no desaturations, covering 712 feet     HOT 2 lpm/NC sitting, 4 lpm on exertion  trelogy inhaler  Nebs--q4 hours  Prednisone use-- once a year. First hospitalization for copd exacerbation 4 month ago  Smoker 50 pack year, quit 5 years ago      Allergies   Allergen Reactions     No Clinical Screening - See Comments      Dust and pollen        Past Medical History:   Diagnosis Date     Chronic hepatitis C (H)     No Comments Provided     Disorder of porphyrin metabolism (H)     No Comments Provided     Lumbar sprain     No Comments Provided     Other and unspecified alcohol dependence, unspecified drinking behavior     No Comments Provided     Other specified chronic obstructive airways disease     No Comments Provided     Personal history of other specified diseases     occupational, hammering     Polycythemia, secondary     No Comments Provided     Unspecified visual loss     near and far        Past Surgical History:   Procedure Laterality Date     CLOSED REDUCTION ANKLE      Fx ankle with plate        Social History     Socioeconomic History     Marital status:      Spouse name: Not on file     Number of children: Not on file     Years of education: Not on file     Highest education level: Not on file   Occupational History     Not on file   Tobacco Use     Smoking status: Former      Packs/day: 1.00     Types: Cigarettes     Quit date: 2016     Years since quittin.3     Smokeless tobacco: Never   Vaping Use     Vaping Use: Never used   Substance and Sexual Activity     Alcohol use: No     Drug use: Never     Sexual activity: Not Currently   Other Topics Concern     Not on file   Social History Narrative    Living [place] [address] [place] [facility].  Lives with partner.  2 children grown.  Disabled from COPD, Hepatitis C.    Likes fishing, doesn't do much due to COPD anymore. Previously a flores.      Social Determinants of Health     Financial Resource Strain: Not on file   Food Insecurity: Not on file   Transportation Needs: Not on file   Physical Activity: Not on file   Stress: Not on file   Social Connections: Not on file   Intimate Partner Violence: Not on file   Housing Stability: Not on file        Family History   Problem Relation Age of Onset     Heart Disease Mother         Heart Disease,valve disease/rheumatic     Other - See Comments Father         sudden death age 70     Arthritis Father         Arthritis     Chronic Obstructive Pulmonary Disease Brother      Cancer Brother         Cancer,Skin cancer     Abdominal Aortic Aneurysm Brother         Immunization History   Administered Date(s) Administered     COVID-19 Vaccine 12+ (Pfizer ) 2022     COVID-19 Vaccine 12+ (Pfizer) 2021, 2021, 2021     COVID-19 Vaccine Bivalent Booster 12+ (Pfizer) 10/13/2022     Flu, Unspecified 2004, 2010     [id number] Novel Flu P-free 2010     HepB-Adult 2002, 10/08/2015, 2017     Influenza (High Dose) 3 valent vaccine 10/30/2012, 10/20/2014, 2015, 10/20/2016, 2017, 2018, 2021     Influenza (IIV3) PF 10/23/2007, 2008, 09/15/2009, 2010, 10/11/2011, 10/14/2013, 2018     Influenza Vaccine 65+ (Fluzone HD) 2020, 2021, 10/13/2022     Influenza, Whole Virus 2005, 10/23/2006      Influenza,INJ,MDCK,PF,Quad >6mo(Flucelvax) 10/07/2019     Pneumo Conj 13-V (2010&after) 10/08/2015     Pneumococcal 23 valent 01/28/2003, 03/27/2012     TD (ADULT, 7+) 08/02/2022     Tdap (Adacel,Boostrix) 06/22/2000, 02/14/2012     Zoster vaccine recombinant adjuvanted (SHINGRIX) 10/07/2019, 12/14/2019     Zoster vaccine, live 10/30/2012       Current Outpatient Medications   Medication Sig     albuterol (PROAIR HFA/PROVENTIL HFA/VENTOLIN HFA) 108 (90 Base) MCG/ACT inhaler Inhale 2 puffs into the lungs every 6 hours as needed for shortness of breath / dyspnea     amLODIPine (NORVASC) 5 MG tablet Take 2.5 mg by mouth daily      aspirin EC 81 MG EC tablet Take 81 mg by mouth daily      cholecalciferol (VITAMIN D3) 25 mcg (1000 units) capsule Take 2 capsules by mouth daily     FLUoxetine (PROZAC) 20 MG capsule Take 1 capsule (20 mg) by mouth daily     Fluticasone-Umeclidin-Vilanterol (TRELEGY ELLIPTA) 100-62.5-25 MCG/INH oral inhaler Inhale 1 puff into the lungs daily     ipratropium - albuterol 0.5 mg/2.5 mg/3 mL (DUONEB) 0.5-2.5 (3) MG/3ML neb solution Take 1 vial (3 mLs) by nebulization every 4 hours as needed for shortness of breath / dyspnea or wheezing     lisinopril-hydrochlorothiazide (ZESTORETIC) 20-12.5 MG tablet Take 1 tablet by mouth daily     loratadine (CLARITIN) 10 MG tablet Take 10 mg by mouth 2 times daily      LORazepam (ATIVAN) 1 MG tablet Take 1 tablet (1 mg) by mouth 3 times daily as needed for anxiety     magnesium oxide (MAG-OX) 400 MG tablet Take 1 tablet (400 mg) by mouth daily     montelukast (SINGULAIR) 10 MG tablet Take 10 mg by mouth At Bedtime      multivitamin, therapeutic (THERA-VIT) TABS tablet Take 1 tablet by mouth daily     Respiratory Therapy Supplies (INNOSPIRE REPLACEMENT FILTER) [facility]      Respiratory Therapy Supplies (NEBULIZER/TUBING/MOUTHPIECE) KIT Use when taking nebs     tamsulosin (FLOMAX) 0.4 MG capsule Take 2 capsules (0.8 mg) by mouth daily     No current  facility-administered medications for this visit.        Review of Systems:  I have done 10 points of review systems and all negative except for those mentioned in HPI    Physical examination  Constitutional: Oriented, not in distress  Vitals: unavailable  Eyes: No icterus, nystagmus, pupils isocoric  Head and neck: normal posture and movements  Respiratory: Normal tidal breathing, no shortness of breath, no audible wheezing or stridor over the phone or video visit  Musculoskeletal: Normal muscle mass, no deformity on hands/fingers  Integumentary:  No rash on visible skin areas on video visit  Neurological: Alert, orientedx3, no motor deficits  Psychiatric:  Mood and affect are appropriate with insight into his/her medical condition    Data:  Lab Results   Component Value Date    WBC 8.7 01/16/2023     Lab Results   Component Value Date    RBC 4.64 01/16/2023     Lab Results   Component Value Date    HGB 15.7 01/16/2023     Lab Results   Component Value Date    HCT 45.2 01/16/2023     Lab Results   Component Value Date    MCV 97 01/16/2023     Lab Results   Component Value Date    MCH 33.8 01/16/2023     Lab Results   Component Value Date    MCHC 34.7 01/16/2023     Lab Results   Component Value Date    RDW 12.1 01/16/2023     Lab Results   Component Value Date     01/16/2023       Lab Results   Component Value Date     01/16/2023      Lab Results   Component Value Date    POTASSIUM 4.5 01/16/2023    POTASSIUM 4.1 08/24/2022     Lab Results   Component Value Date    CHLORIDE 103 01/16/2023    CHLORIDE 101 08/24/2022     Lab Results   Component Value Date    MARCY 8.7 01/16/2023     Lab Results   Component Value Date    CO2 25 01/16/2023    CO2 24 08/24/2022     Lab Results   Component Value Date    BUN 17.4 01/16/2023    BUN 29 08/24/2022     Lab Results   Component Value Date    CR 1.59 01/16/2023     Lab Results   Component Value Date    GLC 78 01/16/2023     08/24/2022         LARISA Cole,  MD

## 2023-01-24 DIAGNOSIS — J44.1 COPD EXACERBATION (H): ICD-10-CM

## 2023-01-26 DIAGNOSIS — J44.1 COPD EXACERBATION (H): ICD-10-CM

## 2023-01-27 RX ORDER — IPRATROPIUM BROMIDE AND ALBUTEROL SULFATE 2.5; .5 MG/3ML; MG/3ML
SOLUTION RESPIRATORY (INHALATION)
Qty: 90 ML | Refills: 0 | Status: SHIPPED | OUTPATIENT
Start: 2023-01-27 | End: 2023-02-03

## 2023-01-27 NOTE — TELEPHONE ENCOUNTER
[facility] [place] [place] sent Rx request for the following:      Requested Prescriptions   Pending Prescriptions Disp Refills     ipratropium - albuterol 0.5 mg/2.5 mg/3 mL (DUONEB) 0.5-2.5 (3) MG/3ML neb solution [Pharmacy Med Name: Ipratropium-Albuterol 0.5-2.5 (3) MG/3ML Inhalation Solution (DUO-NEB)] 90 mL 4     Sig: Take 3 mL by nebulization every six hours as needed for shortness of breath / dyspnea or wheezing.   Last Prescription Date:   10/25/22  Last Fill Qty/Refills:         90 ml, R-4    Last Office Visit:              10/25/22   Future Office visit:           None    In clinical absence of patient's primary, Prince Proctor, patient is requesting that this message be sent to the covering provider for consideration please.    Evita Castañeda RN .............. 1/27/2023  9:49 AM

## 2023-01-27 NOTE — TELEPHONE ENCOUNTER
-- Schedule med management with Dr. Proctor, Prince    Signed, Shant Milton MD, FAAP, FACP  Internal Medicine & Pediatrics

## 2023-01-30 NOTE — TELEPHONE ENCOUNTER
Talked to patient's wife. Pt has advanced emphysema and is on 24/7 oxygen. She states he cannot go out in this weather. Declined to schedule appointment at this time.    Luzmaria Diez on 1/30/2023 at 1:05 PM

## 2023-01-31 RX ORDER — IPRATROPIUM BROMIDE AND ALBUTEROL SULFATE 2.5; .5 MG/3ML; MG/3ML
SOLUTION RESPIRATORY (INHALATION)
Qty: 90 ML | Refills: 4 | OUTPATIENT
Start: 2023-01-31

## 2023-01-31 NOTE — TELEPHONE ENCOUNTER
"Per alternate encounter:   January 30, 2023  Luzmaria Diez  Talked to patient's wife. Pt has advanced emphysema and is on 24/7 oxygen. She states he cannot go out in this weather. Declined to schedule appointment at this time.     Luzmaria Diez on 1/30/2023 at 1:05 PM          Last Prescription Date: 1/27/23  Last Qty/Refills: 90 / ml  Last Office Visit: 10/25/22 [facility]  Future Office Visit: none     Requested Prescriptions   Pending Prescriptions Disp Refills     ipratropium - albuterol 0.5 mg/2.5 mg/3 mL (DUONEB) 0.5-2.5 (3) MG/3ML neb solution [Pharmacy Med Name: Ipratropium-Albuterol 0.5-2.5 (3) MG/3ML Inhalation Solution (DUO-NEB)] 90 mL 4     Sig: Take 3 mL by nebulization every six hours as needed for shortness of breath / dyspnea or wheezing.       Short-Acting Beta Agonist Inhalers Protocol  Passed - 1/26/2023 10:11 AM        Passed - Patient is age 12 or older        Passed - Recent (12 mo) or future (30 days) visit within the authorizing provider's specialty     Patient has had an office visit with the authorizing provider or a provider within the authorizing providers department within the previous 12 mos or has a future within next 30 days. See \"Patient Info\" tab in inbasket, or \"Choose Columns\" in Meds & Orders section of the refill encounter.              Passed - Medication is active on med list       Asthma Nebs Protocol Passed - 1/26/2023 10:11 AM        Passed - Patient is age 4 years or older        Passed - Recent (12 mo) or future (30 days) visit within the authorizing provider's specialty     Patient has had an office visit with the authorizing provider or a provider within the authorizing providers department within the previous 12 mos or has a future within next 30 days. See \"Patient Info\" tab in inbasket, or \"Choose Columns\" in Meds & Orders section of the refill encounter.              Passed - Medication is active on med list             "

## 2023-02-03 ENCOUNTER — MYC MEDICAL ADVICE (OUTPATIENT)
Dept: INTERNAL MEDICINE | Facility: OTHER | Age: 78
End: 2023-02-03
Payer: COMMERCIAL

## 2023-02-03 DIAGNOSIS — J44.1 COPD EXACERBATION (H): ICD-10-CM

## 2023-02-03 RX ORDER — IPRATROPIUM BROMIDE AND ALBUTEROL SULFATE 2.5; .5 MG/3ML; MG/3ML
SOLUTION RESPIRATORY (INHALATION)
Qty: 360 ML | Refills: 8 | Status: SHIPPED | OUTPATIENT
Start: 2023-02-03

## 2023-02-03 NOTE — TELEPHONE ENCOUNTER
He does not need to be seen for refill but should follow up when it warms up in the spring.     Refills sent to his pharmacy.     Prince Proctor MD on 2/3/2023 at 1:49 PM

## 2023-02-03 NOTE — TELEPHONE ENCOUNTER
Prince Proctor  Quantity changed from an 8 day supply to 30 day supply per patient request. Per [employer] message, writer reports they were called to schedule an appointment to follow-up with you but he cannot go out in cold weather. Do you need him to make an appointment? If so, please advise in regards to what.  Deepti Stallworth RN on 2/3/2023 at 10:06 AM    Last Prescription Date: 1/27/23  Last Qty/Refills: 90 / 0  Last Office Visit: 10/25/22 [facility]  Future Office Visit: none     Requested Prescriptions   Pending Prescriptions Disp Refills     ipratropium - albuterol 0.5 mg/2.5 mg/3 mL (DUONEB) 0.5-2.5 (3) MG/3ML neb solution 360 mL 0     Sig: Take 3 mL by nebulization every six hours as needed for shortness of breath / dyspnea or wheezing.  Strength: 0.5-2.5 (3) MG/3ML       There is no refill protocol information for this order        2/1/23 [facility] mailed an 8 day supply: 90 ml. Rx was also filled on 2/1/23 at [facility] pharmacy for 8 day supply. Unable to reach patient by phone, message left. Rx rachid'd up for 30 day supply #360 and routed to MD. Deepti Stallworth RN on 2/3/2023 at 10:03 AM

## 2023-02-06 NOTE — TELEPHONE ENCOUNTER
Informed [facility] about the frozen delivery. This encounter closed. Deepti Stallworth RN on 2/6/2023 at 9:12 AM

## 2023-02-07 RX ORDER — IPRATROPIUM BROMIDE AND ALBUTEROL SULFATE 2.5; .5 MG/3ML; MG/3ML
SOLUTION RESPIRATORY (INHALATION)
Qty: 90 ML | Refills: 4 | Status: SHIPPED | OUTPATIENT
Start: 2023-02-07 | End: 2023-02-07

## 2023-02-07 RX ORDER — IPRATROPIUM BROMIDE AND ALBUTEROL SULFATE 2.5; .5 MG/3ML; MG/3ML
1 SOLUTION RESPIRATORY (INHALATION) EVERY 6 HOURS PRN
Qty: 90 ML | Refills: 4 | Status: ON HOLD | OUTPATIENT
Start: 2023-02-07 | End: 2023-04-15

## 2023-02-07 NOTE — TELEPHONE ENCOUNTER
E-Prescribed Message Needing Your Attention  Received: Today  Interface, Eprescribing  P Gh Er[facility]  An error occurred while processing the e-prescribing message.     ipratropium - albuterol 0.5 mg/2.5 mg/3 mL (DUONEB) 0.5-2.5 (3) MG/3ML neb solution 90 mL 4 2/7/2023     Sig: Take 1 vial (3 mLs) by nebulization every 6 hours as needed for shortness of breath / dyspnea or wheezing    Sent to pharmacy as: Ipratropium-Albuterol 0.5-2.5 (3) MG/3ML Inhalation Solution (DUONEB)    Class: E-Prescribe    E-Prescribing Status: Transmission to pharmacy failed (2/7/2023  9:48 AM CST)      Please resend prescription. Evita Castañeda RN .............. 2/7/2023  9:51 AM

## 2023-02-26 ENCOUNTER — NURSE TRIAGE (OUTPATIENT)
Dept: NURSING | Facility: CLINIC | Age: 78
End: 2023-02-26
Payer: COMMERCIAL

## 2023-02-26 NOTE — TELEPHONE ENCOUNTER
Nurse Triage SBAR    Situation: Bradycardia    Background: Significant Other, Nguyễn, calling. Consent: not needed as patient got on the line.     Assessment: Pt woke up this morning with a headache. He checked his oxygen and heart rate which reads:     Heart rate: 35-36 bpm   Oxygen: 95% on oxygen    Pt checked his rate with a different oximeter on a different finger, reads the same.     He denies chest pain and worsened SOB.     Protocol Recommended Disposition: Call 911. Patient verbalized understanding and had no further questions.      Geri Nguyen RN  [facility] Nurse Advisor    Reason for Disposition    Sounds like a life-threatening emergency to the triager    Additional Information    Negative: Passed out (i.e., lost consciousness, collapsed and was not responding)    Negative: Shock suspected (e.g., cold/pale/clammy skin, too weak to stand, low BP, rapid pulse)    Negative: Difficult to awaken or acting confused (e.g., disoriented, slurred speech)    Negative: Visible sweat on face or sweat dripping down face    Negative: Unable to walk, or can only walk with assistance (e.g., requires support)    Negative: [1] Received SHOCK from implantable cardiac defibrillator AND [2] persisting symptoms (i.e., palpitations, lightheadedness)    Negative: [1] Dizziness, lightheadedness, or weakness AND [2] heart beating very rapidly (e.g., > 140 / minute)    Negative: [1] Dizziness, lightheadedness, or weakness AND [2] heart beating very slowly (e.g., < 50 / minute)    Protocols used: HEART RATE AND HEARTBEAT [id number]

## 2023-04-15 ENCOUNTER — APPOINTMENT (OUTPATIENT)
Dept: CT IMAGING | Facility: OTHER | Age: 78
DRG: 191 | End: 2023-04-15
Attending: STUDENT IN AN ORGANIZED HEALTH CARE EDUCATION/TRAINING PROGRAM
Payer: COMMERCIAL

## 2023-04-15 ENCOUNTER — HOSPITAL ENCOUNTER (INPATIENT)
Facility: OTHER | Age: 78
LOS: 2 days | Discharge: HOME | DRG: 191 | End: 2023-04-17
Attending: STUDENT IN AN ORGANIZED HEALTH CARE EDUCATION/TRAINING PROGRAM | Admitting: STUDENT IN AN ORGANIZED HEALTH CARE EDUCATION/TRAINING PROGRAM
Payer: COMMERCIAL

## 2023-04-15 ENCOUNTER — APPOINTMENT (OUTPATIENT)
Dept: GENERAL RADIOLOGY | Facility: OTHER | Age: 78
DRG: 191 | End: 2023-04-15
Attending: STUDENT IN AN ORGANIZED HEALTH CARE EDUCATION/TRAINING PROGRAM
Payer: COMMERCIAL

## 2023-04-15 DIAGNOSIS — Z99.81 DEPENDENCE ON SUPPLEMENTAL OXYGEN: ICD-10-CM

## 2023-04-15 DIAGNOSIS — E87.1 HYPOSMOLALITY SYNDROME: ICD-10-CM

## 2023-04-15 DIAGNOSIS — J44.1 COPD EXACERBATION (H): ICD-10-CM

## 2023-04-15 DIAGNOSIS — J44.1 OBSTRUCTIVE CHRONIC BRONCHITIS WITH EXACERBATION (H): ICD-10-CM

## 2023-04-15 DIAGNOSIS — E87.1 HYPONATREMIA: ICD-10-CM

## 2023-04-15 PROBLEM — N40.1 BENIGN PROSTATIC HYPERPLASIA WITH LOWER URINARY TRACT SYMPTOMS, SYMPTOM DETAILS UNSPECIFIED: Status: ACTIVE | Noted: 2019-08-06

## 2023-04-15 LAB
ALBUMIN SERPL BCG-MCNC: 3.9 G/DL (ref 3.5–5.2)
ALP SERPL-CCNC: 89 U/L (ref 40–129)
ALT SERPL W P-5'-P-CCNC: 29 U/L (ref 10–50)
ANION GAP SERPL CALCULATED.3IONS-SCNC: 12 MMOL/L (ref 7–15)
AST SERPL W P-5'-P-CCNC: 31 U/L (ref 10–50)
BASOPHILS # BLD AUTO: 0 10E3/UL (ref 0–0.2)
BASOPHILS NFR BLD AUTO: 1 %
BILIRUB SERPL-MCNC: 0.6 MG/DL
BUN SERPL-MCNC: 21.1 MG/DL (ref 8–23)
BUN SERPL-MCNC: 21.6 MG/DL (ref 8–23)
BUN SERPL-MCNC: 22.2 MG/DL (ref 8–23)
BUN SERPL-MCNC: 22.3 MG/DL (ref 8–23)
BUN SERPL-MCNC: 22.8 MG/DL (ref 8–23)
BUN SERPL-MCNC: 23.4 MG/DL (ref 8–23)
CALCIUM SERPL-MCNC: 8.1 MG/DL (ref 8.8–10.2)
CALCIUM SERPL-MCNC: 8.3 MG/DL (ref 8.8–10.2)
CALCIUM SERPL-MCNC: 8.3 MG/DL (ref 8.8–10.2)
CALCIUM SERPL-MCNC: 8.5 MG/DL (ref 8.8–10.2)
CALCIUM SERPL-MCNC: 8.7 MG/DL (ref 8.8–10.2)
CALCIUM SERPL-MCNC: 8.7 MG/DL (ref 8.8–10.2)
CHLORIDE SERPL-SCNC: 85 MMOL/L (ref 98–107)
CHLORIDE SERPL-SCNC: 85 MMOL/L (ref 98–107)
CHLORIDE SERPL-SCNC: 86 MMOL/L (ref 98–107)
CHLORIDE SERPL-SCNC: 86 MMOL/L (ref 98–107)
CHLORIDE SERPL-SCNC: 87 MMOL/L (ref 98–107)
CHLORIDE SERPL-SCNC: 88 MMOL/L (ref 98–107)
CREAT SERPL-MCNC: 0.86 MG/DL (ref 0.67–1.17)
CREAT SERPL-MCNC: 0.88 MG/DL (ref 0.67–1.17)
CREAT SERPL-MCNC: 0.88 MG/DL (ref 0.67–1.17)
CREAT SERPL-MCNC: 0.92 MG/DL (ref 0.67–1.17)
CREAT SERPL-MCNC: 0.95 MG/DL (ref 0.67–1.17)
CREAT SERPL-MCNC: 0.97 MG/DL (ref 0.67–1.17)
DEPRECATED HCO3 PLAS-SCNC: 18 MMOL/L (ref 22–29)
DEPRECATED HCO3 PLAS-SCNC: 19 MMOL/L (ref 22–29)
DEPRECATED HCO3 PLAS-SCNC: 19 MMOL/L (ref 22–29)
DEPRECATED HCO3 PLAS-SCNC: 20 MMOL/L (ref 22–29)
DEPRECATED HCO3 PLAS-SCNC: 21 MMOL/L (ref 22–29)
DEPRECATED HCO3 PLAS-SCNC: 21 MMOL/L (ref 22–29)
EOSINOPHIL # BLD AUTO: 0.1 10E3/UL (ref 0–0.7)
EOSINOPHIL NFR BLD AUTO: 1 %
ERYTHROCYTE [DISTWIDTH] IN BLOOD BY AUTOMATED COUNT: 11.9 % (ref 10–15)
GFR SERPL CREATININE-BSD FRML MDRD: 81 ML/MIN/1.73M2
GFR SERPL CREATININE-BSD FRML MDRD: 83 ML/MIN/1.73M2
GFR SERPL CREATININE-BSD FRML MDRD: 86 ML/MIN/1.73M2
GFR SERPL CREATININE-BSD FRML MDRD: 89 ML/MIN/1.73M2
GFR SERPL CREATININE-BSD FRML MDRD: 89 ML/MIN/1.73M2
GFR SERPL CREATININE-BSD FRML MDRD: 90 ML/MIN/1.73M2
GLUCOSE SERPL-MCNC: 102 MG/DL (ref 70–99)
GLUCOSE SERPL-MCNC: 103 MG/DL (ref 70–99)
GLUCOSE SERPL-MCNC: 104 MG/DL (ref 70–99)
GLUCOSE SERPL-MCNC: 126 MG/DL (ref 70–99)
GLUCOSE SERPL-MCNC: 128 MG/DL (ref 70–99)
GLUCOSE SERPL-MCNC: 142 MG/DL (ref 70–99)
HCT VFR BLD AUTO: 40.2 % (ref 40–53)
HGB BLD-MCNC: 15.3 G/DL (ref 13.3–17.7)
HOLD SPECIMEN: NORMAL
IMM GRANULOCYTES # BLD: 0.1 10E3/UL
IMM GRANULOCYTES NFR BLD: 2 %
LYMPHOCYTES # BLD AUTO: 1 10E3/UL (ref 0.8–5.3)
LYMPHOCYTES NFR BLD AUTO: 14 %
MCH RBC QN AUTO: 34.2 PG (ref 26.5–33)
MCHC RBC AUTO-ENTMCNC: 38.1 G/DL (ref 31.5–36.5)
MCV RBC AUTO: 90 FL (ref 78–100)
MONOCYTES # BLD AUTO: 1.2 10E3/UL (ref 0–1.3)
MONOCYTES NFR BLD AUTO: 18 %
NEUTROPHILS # BLD AUTO: 4.3 10E3/UL (ref 1.6–8.3)
NEUTROPHILS NFR BLD AUTO: 64 %
NRBC # BLD AUTO: 0 10E3/UL
NRBC BLD AUTO-RTO: 0 /100
NT-PROBNP SERPL-MCNC: 250 PG/ML (ref 0–1800)
PLATELET # BLD AUTO: 210 10E3/UL (ref 150–450)
POTASSIUM SERPL-SCNC: 4.8 MMOL/L (ref 3.4–5.3)
POTASSIUM SERPL-SCNC: 4.8 MMOL/L (ref 3.4–5.3)
POTASSIUM SERPL-SCNC: 5 MMOL/L (ref 3.4–5.3)
POTASSIUM SERPL-SCNC: 5.1 MMOL/L (ref 3.4–5.3)
POTASSIUM SERPL-SCNC: 5.1 MMOL/L (ref 3.4–5.3)
POTASSIUM SERPL-SCNC: 5.2 MMOL/L (ref 3.4–5.3)
PROCALCITONIN SERPL IA-MCNC: 0.04 NG/ML
PROT SERPL-MCNC: 6.4 G/DL (ref 6.4–8.3)
RBC # BLD AUTO: 4.48 10E6/UL (ref 4.4–5.9)
SODIUM SERPL-SCNC: 117 MMOL/L (ref 136–145)
SODIUM SERPL-SCNC: 118 MMOL/L (ref 136–145)
SODIUM SERPL-SCNC: 119 MMOL/L (ref 136–145)
SODIUM SERPL-SCNC: 119 MMOL/L (ref 136–145)
SODIUM UR-SCNC: 35 MMOL/L
TSH SERPL DL<=0.005 MIU/L-ACNC: 2.71 UIU/ML (ref 0.3–4.2)
WBC # BLD AUTO: 6.7 10E3/UL (ref 4–11)

## 2023-04-15 PROCEDURE — 84443 ASSAY THYROID STIM HORMONE: CPT | Performed by: STUDENT IN AN ORGANIZED HEALTH CARE EDUCATION/TRAINING PROGRAM

## 2023-04-15 PROCEDURE — 71046 X-RAY EXAM CHEST 2 VIEWS: CPT

## 2023-04-15 PROCEDURE — 36415 COLL VENOUS BLD VENIPUNCTURE: CPT | Performed by: STUDENT IN AN ORGANIZED HEALTH CARE EDUCATION/TRAINING PROGRAM

## 2023-04-15 PROCEDURE — 82310 ASSAY OF CALCIUM: CPT | Performed by: STUDENT IN AN ORGANIZED HEALTH CARE EDUCATION/TRAINING PROGRAM

## 2023-04-15 PROCEDURE — 94640 AIRWAY INHALATION TREATMENT: CPT

## 2023-04-15 PROCEDURE — 96372 THER/PROPH/DIAG INJ SC/IM: CPT | Performed by: STUDENT IN AN ORGANIZED HEALTH CARE EDUCATION/TRAINING PROGRAM

## 2023-04-15 PROCEDURE — 84145 PROCALCITONIN (PCT): CPT | Performed by: STUDENT IN AN ORGANIZED HEALTH CARE EDUCATION/TRAINING PROGRAM

## 2023-04-15 PROCEDURE — 250N000009 HC RX 250: Performed by: STUDENT IN AN ORGANIZED HEALTH CARE EDUCATION/TRAINING PROGRAM

## 2023-04-15 PROCEDURE — 85025 COMPLETE CBC W/AUTO DIFF WBC: CPT | Performed by: STUDENT IN AN ORGANIZED HEALTH CARE EDUCATION/TRAINING PROGRAM

## 2023-04-15 PROCEDURE — 250N000011 HC RX IP 250 OP 636: Performed by: STUDENT IN AN ORGANIZED HEALTH CARE EDUCATION/TRAINING PROGRAM

## 2023-04-15 PROCEDURE — 999N000157 HC STATISTIC RCP TIME EA 10 MIN

## 2023-04-15 PROCEDURE — 84300 ASSAY OF URINE SODIUM: CPT | Performed by: STUDENT IN AN ORGANIZED HEALTH CARE EDUCATION/TRAINING PROGRAM

## 2023-04-15 PROCEDURE — 99285 EMERGENCY DEPT VISIT HI MDM: CPT | Mod: 25 | Performed by: STUDENT IN AN ORGANIZED HEALTH CARE EDUCATION/TRAINING PROGRAM

## 2023-04-15 PROCEDURE — 83935 ASSAY OF URINE OSMOLALITY: CPT | Performed by: STUDENT IN AN ORGANIZED HEALTH CARE EDUCATION/TRAINING PROGRAM

## 2023-04-15 PROCEDURE — 82533 TOTAL CORTISOL: CPT | Performed by: STUDENT IN AN ORGANIZED HEALTH CARE EDUCATION/TRAINING PROGRAM

## 2023-04-15 PROCEDURE — 258N000003 HC RX IP 258 OP 636: Performed by: STUDENT IN AN ORGANIZED HEALTH CARE EDUCATION/TRAINING PROGRAM

## 2023-04-15 PROCEDURE — 99284 EMERGENCY DEPT VISIT MOD MDM: CPT | Performed by: STUDENT IN AN ORGANIZED HEALTH CARE EDUCATION/TRAINING PROGRAM

## 2023-04-15 PROCEDURE — 80053 COMPREHEN METABOLIC PANEL: CPT | Performed by: STUDENT IN AN ORGANIZED HEALTH CARE EDUCATION/TRAINING PROGRAM

## 2023-04-15 PROCEDURE — 83880 ASSAY OF NATRIURETIC PEPTIDE: CPT | Performed by: STUDENT IN AN ORGANIZED HEALTH CARE EDUCATION/TRAINING PROGRAM

## 2023-04-15 PROCEDURE — 87899 AGENT NOS ASSAY W/OPTIC: CPT | Performed by: STUDENT IN AN ORGANIZED HEALTH CARE EDUCATION/TRAINING PROGRAM

## 2023-04-15 PROCEDURE — 250N000013 HC RX MED GY IP 250 OP 250 PS 637: Performed by: STUDENT IN AN ORGANIZED HEALTH CARE EDUCATION/TRAINING PROGRAM

## 2023-04-15 PROCEDURE — 80048 BASIC METABOLIC PNL TOTAL CA: CPT | Performed by: STUDENT IN AN ORGANIZED HEALTH CARE EDUCATION/TRAINING PROGRAM

## 2023-04-15 PROCEDURE — 83930 ASSAY OF BLOOD OSMOLALITY: CPT | Performed by: STUDENT IN AN ORGANIZED HEALTH CARE EDUCATION/TRAINING PROGRAM

## 2023-04-15 PROCEDURE — 120N000001 HC R&B MED SURG/OB

## 2023-04-15 PROCEDURE — 96374 THER/PROPH/DIAG INJ IV PUSH: CPT | Mod: XU | Performed by: STUDENT IN AN ORGANIZED HEALTH CARE EDUCATION/TRAINING PROGRAM

## 2023-04-15 PROCEDURE — 99223 1ST HOSP IP/OBS HIGH 75: CPT | Mod: AI | Performed by: STUDENT IN AN ORGANIZED HEALTH CARE EDUCATION/TRAINING PROGRAM

## 2023-04-15 PROCEDURE — 71260 CT THORAX DX C+: CPT

## 2023-04-15 PROCEDURE — 96375 TX/PRO/DX INJ NEW DRUG ADDON: CPT | Mod: XU | Performed by: STUDENT IN AN ORGANIZED HEALTH CARE EDUCATION/TRAINING PROGRAM

## 2023-04-15 PROCEDURE — 94640 AIRWAY INHALATION TREATMENT: CPT | Mod: 76

## 2023-04-15 RX ORDER — ACETAMINOPHEN 650 MG/1
650 SUPPOSITORY RECTAL EVERY 6 HOURS PRN
Status: DISCONTINUED | OUTPATIENT
Start: 2023-04-15 | End: 2023-04-17 | Stop reason: HOSPADM

## 2023-04-15 RX ORDER — LORAZEPAM 1 MG/1
1 TABLET ORAL 3 TIMES DAILY PRN
Status: DISCONTINUED | OUTPATIENT
Start: 2023-04-15 | End: 2023-04-17 | Stop reason: HOSPADM

## 2023-04-15 RX ORDER — IPRATROPIUM BROMIDE AND ALBUTEROL SULFATE 2.5; .5 MG/3ML; MG/3ML
3 SOLUTION RESPIRATORY (INHALATION) ONCE
Status: COMPLETED | OUTPATIENT
Start: 2023-04-15 | End: 2023-04-15

## 2023-04-15 RX ORDER — AMOXICILLIN 250 MG
1 CAPSULE ORAL 2 TIMES DAILY PRN
Status: DISCONTINUED | OUTPATIENT
Start: 2023-04-15 | End: 2023-04-17 | Stop reason: HOSPADM

## 2023-04-15 RX ORDER — FLUTICASONE FUROATE AND VILANTEROL 100; 25 UG/1; UG/1
1 POWDER RESPIRATORY (INHALATION) DAILY
Status: DISCONTINUED | OUTPATIENT
Start: 2023-04-15 | End: 2023-04-17 | Stop reason: HOSPADM

## 2023-04-15 RX ORDER — IOPAMIDOL 755 MG/ML
100 INJECTION, SOLUTION INTRAVASCULAR ONCE
Status: COMPLETED | OUTPATIENT
Start: 2023-04-15 | End: 2023-04-15

## 2023-04-15 RX ORDER — IPRATROPIUM BROMIDE AND ALBUTEROL SULFATE 2.5; .5 MG/3ML; MG/3ML
3 SOLUTION RESPIRATORY (INHALATION) EVERY 4 HOURS PRN
Status: DISCONTINUED | OUTPATIENT
Start: 2023-04-15 | End: 2023-04-16

## 2023-04-15 RX ORDER — ACETAMINOPHEN 325 MG/1
650 TABLET ORAL EVERY 6 HOURS PRN
Status: DISCONTINUED | OUTPATIENT
Start: 2023-04-15 | End: 2023-04-17 | Stop reason: HOSPADM

## 2023-04-15 RX ORDER — CALCIUM CARBONATE 500 MG/1
1000 TABLET, CHEWABLE ORAL 4 TIMES DAILY PRN
Status: DISCONTINUED | OUTPATIENT
Start: 2023-04-15 | End: 2023-04-17 | Stop reason: HOSPADM

## 2023-04-15 RX ORDER — PROCHLORPERAZINE 25 MG
12.5 SUPPOSITORY, RECTAL RECTAL EVERY 12 HOURS PRN
Status: DISCONTINUED | OUTPATIENT
Start: 2023-04-15 | End: 2023-04-17 | Stop reason: HOSPADM

## 2023-04-15 RX ORDER — MAGNESIUM OXIDE 400 MG/1
400 TABLET ORAL DAILY
Status: DISCONTINUED | OUTPATIENT
Start: 2023-04-16 | End: 2023-04-17 | Stop reason: HOSPADM

## 2023-04-15 RX ORDER — LEVOFLOXACIN 5 MG/ML
750 INJECTION, SOLUTION INTRAVENOUS ONCE
Status: COMPLETED | OUTPATIENT
Start: 2023-04-15 | End: 2023-04-15

## 2023-04-15 RX ORDER — ALBUTEROL SULFATE 90 UG/1
2 AEROSOL, METERED RESPIRATORY (INHALATION) EVERY 6 HOURS PRN
Status: DISCONTINUED | OUTPATIENT
Start: 2023-04-15 | End: 2023-04-17 | Stop reason: HOSPADM

## 2023-04-15 RX ORDER — POLYETHYLENE GLYCOL 3350 17 G/17G
17 POWDER, FOR SOLUTION ORAL DAILY PRN
Status: DISCONTINUED | OUTPATIENT
Start: 2023-04-15 | End: 2023-04-17 | Stop reason: HOSPADM

## 2023-04-15 RX ORDER — METHYLPREDNISOLONE SODIUM SUCCINATE 125 MG/2ML
125 INJECTION, POWDER, LYOPHILIZED, FOR SOLUTION INTRAMUSCULAR; INTRAVENOUS ONCE
Status: COMPLETED | OUTPATIENT
Start: 2023-04-15 | End: 2023-04-15

## 2023-04-15 RX ORDER — TAMSULOSIN HYDROCHLORIDE 0.4 MG/1
0.8 CAPSULE ORAL DAILY
Status: DISCONTINUED | OUTPATIENT
Start: 2023-04-16 | End: 2023-04-17 | Stop reason: HOSPADM

## 2023-04-15 RX ORDER — ONDANSETRON 2 MG/ML
4 INJECTION INTRAMUSCULAR; INTRAVENOUS EVERY 6 HOURS PRN
Status: DISCONTINUED | OUTPATIENT
Start: 2023-04-15 | End: 2023-04-17 | Stop reason: HOSPADM

## 2023-04-15 RX ORDER — PREDNISONE 20 MG/1
40 TABLET ORAL DAILY
Status: DISCONTINUED | OUTPATIENT
Start: 2023-04-16 | End: 2023-04-17 | Stop reason: HOSPADM

## 2023-04-15 RX ORDER — HYDROMORPHONE HYDROCHLORIDE 1 MG/ML
0.5 INJECTION, SOLUTION INTRAMUSCULAR; INTRAVENOUS; SUBCUTANEOUS ONCE
Status: COMPLETED | OUTPATIENT
Start: 2023-04-15 | End: 2023-04-15

## 2023-04-15 RX ORDER — LIDOCAINE 40 MG/G
CREAM TOPICAL
Status: DISCONTINUED | OUTPATIENT
Start: 2023-04-15 | End: 2023-04-17 | Stop reason: HOSPADM

## 2023-04-15 RX ORDER — ASPIRIN 81 MG/1
81 TABLET ORAL DAILY
Status: DISCONTINUED | OUTPATIENT
Start: 2023-04-16 | End: 2023-04-17 | Stop reason: HOSPADM

## 2023-04-15 RX ORDER — TERBUTALINE SULFATE 1 MG/ML
0.25 INJECTION, SOLUTION SUBCUTANEOUS ONCE
Status: COMPLETED | OUTPATIENT
Start: 2023-04-15 | End: 2023-04-15

## 2023-04-15 RX ORDER — ONDANSETRON 4 MG/1
4 TABLET, ORALLY DISINTEGRATING ORAL EVERY 6 HOURS PRN
Status: DISCONTINUED | OUTPATIENT
Start: 2023-04-15 | End: 2023-04-17 | Stop reason: HOSPADM

## 2023-04-15 RX ORDER — IPRATROPIUM BROMIDE AND ALBUTEROL SULFATE 2.5; .5 MG/3ML; MG/3ML
3 SOLUTION RESPIRATORY (INHALATION) EVERY 4 HOURS PRN
Status: DISCONTINUED | OUTPATIENT
Start: 2023-04-15 | End: 2023-04-17 | Stop reason: HOSPADM

## 2023-04-15 RX ORDER — AMLODIPINE BESYLATE 2.5 MG/1
2.5 TABLET ORAL DAILY
Status: DISCONTINUED | OUTPATIENT
Start: 2023-04-16 | End: 2023-04-17 | Stop reason: HOSPADM

## 2023-04-15 RX ORDER — ENOXAPARIN SODIUM 100 MG/ML
40 INJECTION SUBCUTANEOUS EVERY 24 HOURS
Status: DISCONTINUED | OUTPATIENT
Start: 2023-04-15 | End: 2023-04-17 | Stop reason: HOSPADM

## 2023-04-15 RX ORDER — PROCHLORPERAZINE MALEATE 5 MG
5 TABLET ORAL EVERY 6 HOURS PRN
Status: DISCONTINUED | OUTPATIENT
Start: 2023-04-15 | End: 2023-04-17 | Stop reason: HOSPADM

## 2023-04-15 RX ORDER — AMOXICILLIN 250 MG
2 CAPSULE ORAL 2 TIMES DAILY PRN
Status: DISCONTINUED | OUTPATIENT
Start: 2023-04-15 | End: 2023-04-17 | Stop reason: HOSPADM

## 2023-04-15 RX ORDER — LEVOFLOXACIN 5 MG/ML
750 INJECTION, SOLUTION INTRAVENOUS EVERY 24 HOURS
Status: DISCONTINUED | OUTPATIENT
Start: 2023-04-16 | End: 2023-04-17 | Stop reason: HOSPADM

## 2023-04-15 RX ORDER — KETOROLAC TROMETHAMINE 15 MG/ML
15 INJECTION, SOLUTION INTRAMUSCULAR; INTRAVENOUS ONCE
Status: COMPLETED | OUTPATIENT
Start: 2023-04-15 | End: 2023-04-15

## 2023-04-15 RX ADMIN — IPRATROPIUM BROMIDE AND ALBUTEROL SULFATE 3 ML: .5; 2.5 SOLUTION RESPIRATORY (INHALATION) at 10:55

## 2023-04-15 RX ADMIN — IPRATROPIUM BROMIDE AND ALBUTEROL SULFATE 3 ML: .5; 2.5 SOLUTION RESPIRATORY (INHALATION) at 22:01

## 2023-04-15 RX ADMIN — LEVOFLOXACIN 750 MG: 5 INJECTION, SOLUTION INTRAVENOUS at 13:28

## 2023-04-15 RX ADMIN — METHYLPREDNISOLONE SODIUM SUCCINATE 125 MG: 125 INJECTION, POWDER, FOR SOLUTION INTRAMUSCULAR; INTRAVENOUS at 11:01

## 2023-04-15 RX ADMIN — KETOROLAC TROMETHAMINE 15 MG: 15 INJECTION, SOLUTION INTRAMUSCULAR; INTRAVENOUS at 11:00

## 2023-04-15 RX ADMIN — ENOXAPARIN SODIUM 40 MG: 40 INJECTION SUBCUTANEOUS at 21:36

## 2023-04-15 RX ADMIN — TERBUTALINE SULFATE 0.25 MG: 1 INJECTION, SOLUTION SUBCUTANEOUS at 11:01

## 2023-04-15 RX ADMIN — ACETAMINOPHEN 650 MG: 325 TABLET ORAL at 21:41

## 2023-04-15 RX ADMIN — HYDROMORPHONE HYDROCHLORIDE 0.5 MG: 1 INJECTION, SOLUTION INTRAMUSCULAR; INTRAVENOUS; SUBCUTANEOUS at 14:01

## 2023-04-15 RX ADMIN — SODIUM CHLORIDE 1000 ML: 9 INJECTION, SOLUTION INTRAVENOUS at 13:28

## 2023-04-15 RX ADMIN — IOPAMIDOL 100 ML: 755 INJECTION, SOLUTION INTRAVENOUS at 16:27

## 2023-04-15 ASSESSMENT — ACTIVITIES OF DAILY LIVING (ADL)
ADLS_ACUITY_SCORE: 24
ADLS_ACUITY_SCORE: 35
ADLS_ACUITY_SCORE: 35
ADLS_ACUITY_SCORE: 24
ADLS_ACUITY_SCORE: 24

## 2023-04-15 NOTE — PROGRESS NOTES
Pt currently on 5L nc (wears 4L chronic) will start MDI's as order to start in the am   Has not req'd any PRN tx's since the ED  No other interventions this shift    Cora Rob, RT on 4/15/2023 at 6:16 PM

## 2023-04-15 NOTE — PROGRESS NOTES
DATE:  4/15/2023   TIME OF RECEIPT FROM LAB:  1153  LAB TEST:  Sodium  LAB VALUE:  117  RESULTS GIVEN WITH READ-BACK TO (PROVIDER):  Bassem Blount MD  TIME LAB VALUE REPORTED TO PROVIDER:   1151

## 2023-04-15 NOTE — PLAN OF CARE
Patient c/o shortness of breath. Tachypneic at times. O2 92-93% on 5 L via n/c. Patient uses 4 L home O2 chronically. Dyspnea with exertion. Infrequent productive cough. LS coarse with wheezing. Skin intact. Per patient, hesitancy while voiding and/or feeling of incomplete bladder emptying. SBA/Independent in room. Emesis x1; denies nausea. VSS.

## 2023-04-15 NOTE — H&P
[facility]    History and Physical  Hospitalist       Date of Admission:  4/15/2023    Assessment & Plan   Brent Villagomez is a 78 year old male with COPD on 4L O2 at baseline and recent COVID-19 infection who presents with worsening malaise, cough, dyspnea for the past 2 to 3 days found to be hyponatremic to 117 in the setting of increased fluid intake and a 60 pack year smoking history.     Principal Problem:    Hyponatremia    Assessment: Patient found to be hyponatremic to 117 on admission for 2-3 days of worsening malaise.  In recovering from COVID-19 infection, he has increased his fluid intake to ~12 cups of water per day. On exam, patient is euvolemic/hypovolemic. Differential diagnosis for euvolemic/hypovolemic hyponatremia includes SIADH, hypothyroidism, glucocorticoid insufficiency, primary polydipsia, mineralocorticoid deficiency, extra renal loses.  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  Concern for SIADH, could have a pulmonary neoplasm with 60+ pack year smoking history however lung CT was negative for malignancy.     Plan:   -1L NS Bolus  -Trend sodium every 4 hours  -Chest CT with contrast to assess for malignancy/PNA  -If hyponatremia improves with NS bolus, diagnosis likely primary polydipsia.  If hyponatremia does not improve, increase concern for SIADH.  -TSH, cortisol, urine osmolality, blood osmolality, urine sodium.      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    Plan:   -Levaquin 750 IV  -Continue DuoNeb as needed every 4 hours  -Continue home  "Incruse Ellipta, Breo Ellipta inhalers  -Prednisone 40 mg p.o. daily  -Chest CT with contrast to assess for malignancy/PNA     Recent COVID-19 Infection  Assessment: Patient is out of the infectious window and does not need isolation precautions.         Benign prostatic hyperplasia with lower urinary tract symptoms, symptom details unspecified    Assessment: No acute concern.  Continue home Flomax    Plan:   -Continue Flomax 0.8 mg p.o. daily        Major depressive disorder, recurrent episode, moderate (H)    Assessment: Minimal concern for major depressive disorder contributing to acute presentation.  Patient denies ever taking Prozac.        Clinically Significant Risk Factors Present on Admission         # Hyponatremia: Lowest Na = 117 mmol/L in last 2 days, will monitor as appropriate  # Hypocalcemia: Lowest Ca = 8.1 mg/dL in last 2 days, will monitor and replace as appropriate         # Hypertension: home medication list includes antihypertensive(s)      # Overweight: Estimated body mass index is 27.37 kg/m  as calculated from the following:    Height as of this encounter: 1.727 m (5' 8\").    Weight as of this encounter: 81.6 kg (180 lb).             DVT Prophylaxis: Enoxaparin (Lovenox) SQ  Code Status: Full Code    Prince Proctor MD       I was present with the medical student who participated in the service and in the documentation of the note. I have verified the history and personally performed the physical exam and medical decision making. I agree with the assessment and plan of care as documented in the note.    Prince Proctor MD on 4/15/2023 at 4:54 PM          Primary Care Physician   Prince Proctor    Chief Complaint   78-year-old male presenting with increasing fatigue, malaise, dyspnea for the last 2 to 3 days.    History is obtained from the patient and chart review.    History of Present Illness   Brent Villagomez is a 78 year old male with COPD who presents with 2 to 3 days of fatigue, malaise, " shortness of breath, and poor sleep.  Patient was ill with COVID about 2 weeks ago and feels like he never recovered to baseline from this.  2 to 3 days ago, he became increasingly fatigued and dyspneic with decreased energy.  Patient reports he has had difficulty sleeping for the past couple weeks.  Since carlos COVID he has increased his fluid intake to around 12 cups of water per day.  Patient is generally on 4 to 4-1/2 L of oxygen at home and has felt increasingly short of breath, necessitating increased oxygen.  Patient has a 60-pack-year smoking history, quitting 7 years ago.  He denies alcohol use.  Patient has not noticed any increased swelling in his lower extremities.  He denies abdominal pain, chest discomfort, difficulty urinating or with bowel movements, or numbness or tingling in his extremities.  Patient reports using Ativan 2-3 times per week.  His daily medications include DuoNeb and Trelegy, lisinopril-hydrochlorothiazide, amlodipine, Flomax.  He denies ever taking Prozac, although this is prescribed to him.    Past Medical History    I have reviewed this patient's medical history and updated it with pertinent information if needed.   Past Medical History:   Diagnosis Date     Chronic hepatitis C (H)     No Comments Provided     Disorder of porphyrin metabolism (H)     No Comments Provided     Lumbar sprain     No Comments Provided     Other and unspecified alcohol dependence, unspecified drinking behavior     No Comments Provided     Other specified chronic obstructive airways disease     No Comments Provided     Personal history of other specified diseases     occupational, hammering     Polycythemia, secondary     No Comments Provided     Unspecified visual loss     near and far       Past Surgical History   I have reviewed this patient's surgical history and updated it with pertinent information if needed.  Past Surgical History:   Procedure Laterality Date     CLOSED REDUCTION ANKLE       Fx ankle with plate       Prior to Admission Medications   Prior to Admission Medications   Prescriptions Last Dose Informant Patient Reported? Taking?   Fluticasone-Umeclidin-Vilanterol (TRELEGY ELLIPTA) 100-62.5-25 MCG/INH oral inhaler 4/14/2023 at AM  No Yes   Sig: Inhale 1 puff into the lungs daily   LORazepam (ATIVAN) 1 MG tablet Past Week  No Yes   Sig: Take 1 tablet (1 mg) by mouth 3 times daily as needed for anxiety   Respiratory Therapy Supplies (INNOSPIRE REPLACEMENT FILTER) MISC NA Self Yes No   Respiratory Therapy Supplies (NEBULIZER/TUBING/MOUTHPIECE) KIT NA Self Yes No   Sig: Use when taking nebs   albuterol (PROAIR HFA/PROVENTIL HFA/VENTOLIN HFA) 108 (90 Base) MCG/ACT inhaler 4/15/2023 at AM  No Yes   Sig: Inhale 2 puffs into the lungs every 6 hours as needed for shortness of breath / dyspnea   amLODIPine (NORVASC) 5 MG tablet 4/15/2023 at AM Self Yes Yes   Sig: Take 2.5 mg by mouth daily    aspirin EC 81 MG EC tablet 4/15/2023 at AM Self Yes Yes   Sig: Take 81 mg by mouth daily    ipratropium - albuterol 0.5 mg/2.5 mg/3 mL (DUONEB) 0.5-2.5 (3) MG/3ML neb solution 4/15/2023 at 0800  No Yes   Sig: Take 3 mL by nebulization every six hours as needed for shortness of breath / dyspnea or wheezing. Strength: 0.5-2.5 (3) MG/3ML   lisinopril-hydrochlorothiazide (ZESTORETIC) 20-12.5 MG tablet 4/15/2023 at AM  No Yes   Sig: Take 1 tablet by mouth daily   loratadine (CLARITIN) 10 MG tablet 4/15/2023 at AM Self Yes Yes   Sig: Take 10 mg by mouth 2 times daily    magnesium oxide (MAG-OX) 400 MG tablet 4/15/2023 at Am  No Yes   Sig: Take 1 tablet (400 mg) by mouth daily   multivitamin, therapeutic (THERA-VIT) TABS tablet 4/15/2023 at AM Self Yes Yes   Sig: Take 1 tablet by mouth daily   tamsulosin (FLOMAX) 0.4 MG capsule 4/15/2023 at AM  No Yes   Sig: Take 2 capsules (0.8 mg) by mouth daily      Facility-Administered Medications: None     Allergies   Allergies   Allergen Reactions     No Clinical Screening - See  Comments      Dust and pollen       Social History   I have reviewed this patient's social history and updated it with pertinent information if needed. Brent Villagomez  reports that he quit smoking about 6 years ago. His smoking use included cigarettes. He smoked an average of 1 pack per day. He has never used smokeless tobacco. He reports that he does not drink alcohol and does not use drugs.    Family History   I have reviewed this patient's family history and updated it with pertinent information if needed.   Family History   Problem Relation Age of Onset     Heart Disease Mother         Heart Disease,valve disease/rheumatic     Other - See Comments Father         sudden death age 70     Arthritis Father         Arthritis     Chronic Obstructive Pulmonary Disease Brother      Cancer Brother         Cancer,Skin cancer     Abdominal Aortic Aneurysm Brother        Review of Systems     REVIEW OF SYSTEMS:    Comprehensive 12 point review of systems other than those listed in the HPI are otherwise negative.         Physical Exam   Temp: 97.5  F (36.4  C) Temp src: Tympanic BP: 129/70 Pulse: 80   Resp: 24 SpO2: 94 % O2 Device: Nasal cannula Oxygen Delivery: 5 LPM  Vital Signs with Ranges  Temp:  [97.5  F (36.4  C)-98  F (36.7  C)] 97.5  F (36.4  C)  Pulse:  [75-80] 80  Resp:  [24-28] 24  BP: (114-131)/(70-97) 129/70  SpO2:  [94 %-97 %] 94 %  180 lbs 0 oz    Constitutional: Patient comfortable.  In no acute distress.  Eyes: Pupils equal round reactive to light.  HEENT: Mucous membranes moist.  Respiratory: Diffuse wheezing bilaterally in all lung fields.  Breathing comfortably on 5 L high flow nasal cannula.  Cardiovascular: Regular rate and rhythm.  Well perfused.  No JVD appreciated.  No lower extremity edema appreciated.  Volume status is euvolemic/hypovolemic.  GI: No abdominal pain.  Lymph/Hematologic: No lymphadenopathy appreciated.  Genitourinary: No hematuria, hematochezia.  Skin: No recent  changes.  Musculoskeletal: No abnormalities.  Neurologic: No focal deficits appreciated.  Psychiatric: Appropriate affect.    Data   Data reviewed today:  I personally reviewed the chest x-ray image(s) showing No acute infiltrate and the chest CT image(s) showing No acute infiltrate or malignancy.  Recent Labs   Lab 04/15/23  1600 04/15/23  1210 04/15/23  1034   WBC  --   --  6.7   HGB  --   --  15.3   MCV  --   --  90   PLT  --   --  210   * 119*  118* 117*   POTASSIUM 5.0 4.8  4.8 5.2   CHLORIDE 87* 86*  85* 86*   CO2 18* 21*  21* 19*   BUN 22.2 23.4*  21.6 21.1   CR 0.86 0.95  0.97 0.88   ANIONGAP 12 12  12 12   MARCY 8.1* 8.3*  8.5* 8.7*   * 102*  103* 104*   ALBUMIN  --  3.9  --    PROTTOTAL  --  6.4  --    BILITOTAL  --  0.6  --    ALKPHOS  --  89  --    ALT  --  29  --    AST  --  31  --        Recent Results (from the past 24 hour(s))   XR Chest 2 Views    Narrative    PROCEDURE:  XR CHEST 2 VIEWS    HISTORY: Cough.    COMPARISON:  1/16/2023    FINDINGS:  The cardiomediastinal contours are stable. The trachea is midline.  There is calcific aortic atherosclerosis.  Advanced emphysema. Chronic bibasilar scarring. Improved aeration of  the left lung base. No new discrete infiltrate. The diaphragms are  flattened.  Osteopenia or osteoporosis. No subdiaphragmatic free air.      Impression    IMPRESSION:      Chronic emphysema and scarring.    NAVDEEP OLSON MD         SYSTEM ID:  [id number]   CT Chest w Contrast    Narrative    PROCEDURE:  CT CHEST W CONTRAST.      HISTORY:  Hyponatremia, recent COVID infection. Eval for lung mass or  PNA.      TECHNIQUE:  Contrast enhanced helical thoracic CT was performed. This  CT exam was performed using one or more the following dose reduction  techniques: automated exposure control, adjustment of the mA and/or kV  according to patient size, and/or iterative reconstruction technique.    COMPARISON:  4/15/2023, 8/12/2022    MEDS/CONTRAST: ISOVUE 370,  100 mL     FINDINGS: Respiratory motion mildly limits assessment.    The heart and mediastinum are unchanged in appearance.     The central airways are patent. There is no focal consolidation.  Advanced emphysematous changes are again seen. A 9 mm subpleural  nodule in the right lower lobe is chronic and unchanged.         Limited views of the upper abdomen reveal no adrenal mass or acute  process.     Multiple previously described left rib fractures are associated with  interval healing. No suspicious osseous lesions are seen.      Impression    IMPRESSION:    Advanced emphysema.    NAVDEEP OLSON MD         SYSTEM ID:  [id number]

## 2023-04-15 NOTE — ED TRIAGE NOTES
Pt tested positive for Covid  2 weeks ago, took Paxlovid initially once done symptoms worsened, c/o body aches, weakness, now pt more SOB, pt has COPD and uses 4L home O2, pt needing more home O2 up to 5-6L. Pt LS wheezes and dyspneic with exertion. Pt to [place] [facility]     Triage Assessment     Row Name 04/15/23 1018       Triage Assessment (Adult)    Airway WDL X    Additional Documentation Breath Sounds (Group)       Respiratory WDL    Respiratory WDL X;all    Rhythm/Pattern, Respiratory labored;breathlessness;shallow;shortness of breath    Expansion/Accessory Muscles/Retractions abdominal muscle use    Cough Frequency frequent    Cough Type productive       Breath Sounds    Breath Sounds All Fields    All Lung Fields Breath Sounds diminished;wheezes, expiratory;wheezes, inspiratory       Skin Circulation/Temperature WDL    Skin Circulation/Temperature WDL WDL       Cardiac WDL    Cardiac WDL WDL       Peripheral/Neurovascular WDL    Peripheral Neurovascular WDL WDL       Cognitive/Neuro/Behavioral WDL    Cognitive/Neuro/Behavioral WDL WDL

## 2023-04-15 NOTE — PROGRESS NOTES
"NS ADMISSION NOTE    Patient admitted to room 352 at approximately 1420 via wheel chair from emergency room. Patient was accompanied by transport tech.     Verbal SBAR report received from RAVINDER Loco prior to patient arrival.     Patient ambulated to bed with stand-by assist. Patient alert and oriented X 3. The patient is not having any pain.  . Admission vital signs: Blood pressure 129/70, pulse 80, temperature 97.5  F (36.4  C), temperature source Tympanic, resp. rate 24, height 1.727 m (5' 8\"), weight 81.6 kg (180 lb), SpO2 94 %. Patient was oriented to plan of care, call light, bed controls, tv, telephone, bathroom and visiting hours.       Ketty Barroso RN    "

## 2023-04-15 NOTE — PROVIDER NOTIFICATION
04/15/23 1522   Valuables   Patient Belongings Remaining with Patient cell phone/electronics;clothing;shoes       [facility] will make every effort per our policy to help keep your items safe while in the hospital.  If you choose to keep any items at the bedside, we cannot be held responsible for any items that are lost or broken.      List items sent to safe: NA    I have reviewed my belongings list on admission and verify that it is correct.     Patient signature_______________________________  Date/Time_____________________    2nd Staff person if patient unable to sign __________________________  Date/Time ______________________      I have received all my belongings noted above at discharge.    Patient signature________________________________  Date/Time  __________________________

## 2023-04-15 NOTE — ED PROVIDER NOTES
Emergency Department Provider Note  : 1945 Age: 78 year old Sex: male MRN: [id number]    Chief Complaint   Patient presents with     Breathing Problem     Cough     Shortness of Breath       Medical Decision Making / Assessment / Plan   78 year old male with a PMH of severe COPD on 4 L of baseline oxygen with recent COVID treatment within the past 2 weeks who presents for evaluation of mild increase in oxygen requirements, mild increase in dyspnea on exertion, and ongoing mildly productive cough.  On arrival he is afebrile, nontachycardic, normotensive, with oxygen saturation of 95% on 5 L nasal cannula.  He does appear somewhat tachypneic and he has diffuse wheezing throughout all lung fields.  Otherwise his exam is reassuring without evidence of volume overload and he has no history of heart failure.  Labs and x-ray pending.  Will empirically treat with a minimum COPD exacerbation here with DuoNebs and initial steroid therapy while awaiting results and reassess.    ED Course as of 04/15/23 1334   Sat Apr 15, 2023   1127 Procalcitonin: 0.04   1127 WBC: 6.7   1127 Hemoglobin: 15.3   1127 XR Chest 2 Views  IMPRESSION:       Chronic emphysema and scarring.   1252 Work-up notable for hyponatremia to 117.  Did repeat this given this is a complete outlier from patient's history but repeat is consistent at 118.   1253 Urine and serum osm and urine Na added on. Given 1 L NS bolus. Will admit for severe hyponatremia.  Although chest x-ray is clear, did add on Legionella urine antigen given lower respiratory symptoms in combination with hyponatremia.   1320 Patient in agreement with admission for treatment for hyponatremia and ongoing management of COPD exacerbation.  Levaquin ordered empirically.      New Prescriptions    No medications on file       Final diagnoses:   COPD exacerbation (H)   Hyponatremia       Bassem Blount MD  4/15/2023   Emergency Department    Eulogio Kennedy is a 78 year old male with  "PMH of severe COPD who presents at 10:16 AM for evaluation of concern for developing pneumonia.  The patient was diagnosed with COVID roughly 2 weeks ago and completed a course of Paxlovid.  After that time he reports experiencing what he describes as rebound symptoms with ongoing and perhaps somewhat worsening productive cough, increased dyspnea on exertion, and had a small increase in oxygen requirement from 4 L to 4-1/2-5 at times with exertion.  He has been using his Trelegy without significant relief in symptoms.  Has not been on steroids at that timeframe.  Has not had any marianne fevers.  No GI symptoms of vomiting or diarrhea.    I have reviewed the Medications, Allergies, Past Medical and Surgical History, and Social History in the [employer] [facility] and with family.    Review of Systems:  Please see HPI for pertinent positives and negatives. All other systems reviewed and found to be negative.      Objective   BP: 118/72  Pulse: 75  Temp: 98  F (36.7  C)  Resp: 28  Height: 172.7 cm (5' 8\")  Weight: 81.6 kg (180 lb)  SpO2: 95 %    Physical Exam:   Gen: Comfortable. NAD  HEENT: MMM. AT/NC.  Eye: EOMI.   CV:  Regular rate and rhythm.  Well-perfused.  No significant lower extremity edema.  Pulm:  Mildly increased respiratory effort with slight tachypnea.  Diffuse wheezing throughout all lung fields.  Abd: ND.   Ext: No significant edema.  Neuro: AOx3, no focal deficit noted  Psych: Appropriate affect, cognition intact    Procedures / Critical Care   Procedures    Critical Care Time: none         Medical/Surgical History:  Past Medical History:   Diagnosis Date     Chronic hepatitis C (H)     No Comments Provided     Disorder of porphyrin metabolism (H)     No Comments Provided     Lumbar sprain     No Comments Provided     Other and unspecified alcohol dependence, unspecified drinking behavior     No Comments Provided     Other specified chronic obstructive airways disease     No Comments Provided     Personal history " of other specified diseases     occupational, hammering     Polycythemia, secondary     No Comments Provided     Unspecified visual loss     near and far     Past Surgical History:   Procedure Laterality Date     CLOSED REDUCTION ANKLE      Fx ankle with plate       Medications:  Current Facility-Administered Medications   Medication     0.9% sodium chloride BOLUS     levofloxacin (LEVAQUIN) infusion 750 mg     Current Outpatient Medications   Medication     ipratropium - albuterol 0.5 mg/2.5 mg/3 mL (DUONEB) 0.5-2.5 (3) MG/3ML neb solution     albuterol (PROAIR HFA/PROVENTIL HFA/VENTOLIN HFA) 108 (90 Base) MCG/ACT inhaler     amLODIPine (NORVASC) 5 MG tablet     aspirin EC 81 MG EC tablet     cholecalciferol (VITAMIN D3) 25 mcg (1000 units) capsule     FLUoxetine (PROZAC) 20 MG capsule     Fluticasone-Umeclidin-Vilanterol (TRELEGY ELLIPTA) 100-62.5-25 MCG/INH oral inhaler     ipratropium - albuterol 0.5 mg/2.5 mg/3 mL (DUONEB) 0.5-2.5 (3) MG/3ML neb solution     lisinopril-hydrochlorothiazide (ZESTORETIC) 20-12.5 MG tablet     loratadine (CLARITIN) 10 MG tablet     LORazepam (ATIVAN) 1 MG tablet     magnesium oxide (MAG-OX) 400 MG tablet     montelukast (SINGULAIR) 10 MG tablet     multivitamin, therapeutic (THERA-VIT) TABS tablet     Respiratory Therapy Supplies (INNOSPIRE RE[place]ACEMENT FILTER) [facility] [place]     Respiratory Therapy Supplies (NEBULIZER/TUBING/MOUTHPIECE) KIT     tamsulosin (FLOMAX) 0.4 MG capsule       Allergies:  No clinical screening - see comments    Relevant labs, images, EKGs, Epic and outside hospital (if applicable) charts were reviewed. The findings, diagnosis, plan, and need for follow up were discussed with the patient/family. Nursing notes were reviewed.      Bassem Blount MD  04/15/23 8902       Bassem Blount MD  04/15/23 6361

## 2023-04-15 NOTE — PHARMACY-ADMISSION MEDICATION HISTORY
Pharmacist Admission Medication History    Admission medication history is complete. The information provided in this note is only as accurate as the sources available at the time of the update.    Medication reconciliation/reorder completed by provider prior to medication history? Yes    Information Source(s): Patient, Clinic records and CareEverywhere/SureScripts via phone    Pertinent Information: patient uses albuterol on a daily basis    Changes made to PTA medication list:    Added: None    Deleted: vitamin d, fluoxetine, singulair    Changed: None    Medication Affordability: No issues        Allergies reviewed with patient and updates made in EHR: yes    Medication History Completed By: Kemi Torres RPH 4/15/2023 3:41 PM    PTA Med List   Medication Sig Last Dose     albuterol (PROAIR HFA/PROVENTIL HFA/VENTOLIN HFA) 108 (90 Base) MCG/ACT inhaler Inhale 2 puffs into the lungs every 6 hours as needed for shortness of breath / dyspnea 4/15/2023 at AM     amLODIPine (NORVASC) 5 MG tablet Take 2.5 mg by mouth daily  4/15/2023 at AM     aspirin EC 81 MG EC tablet Take 81 mg by mouth daily  4/15/2023 at AM     Fluticasone-Umeclidin-Vilanterol (TRELEGY ELLIPTA) 100-62.5-25 MCG/INH oral inhaler Inhale 1 puff into the lungs daily 4/14/2023 at AM     ipratropium - albuterol 0.5 mg/2.5 mg/3 mL (DUONEB) 0.5-2.5 (3) MG/3ML neb solution Take 3 mL by nebulization every six hours as needed for shortness of breath / dyspnea or wheezing. Strength: 0.5-2.5 (3) MG/3ML 4/15/2023 at 0800     lisinopril-hydrochlorothiazide (ZESTORETIC) 20-12.5 MG tablet Take 1 tablet by mouth daily 4/15/2023 at AM     loratadine (CLARITIN) 10 MG tablet Take 10 mg by mouth 2 times daily  4/15/2023 at AM     LORazepam (ATIVAN) 1 MG tablet Take 1 tablet (1 mg) by mouth 3 times daily as needed for anxiety Past Week     magnesium oxide (MAG-OX) 400 MG tablet Take 1 tablet (400 mg) by mouth daily 4/15/2023 at Am     multivitamin, therapeutic  (THERA-VIT) TABS tablet Take 1 tablet by mouth daily 4/15/2023 at AM     tamsulosin (FLOMAX) 0.4 MG capsule Take 2 capsules (0.8 mg) by mouth daily 4/15/2023 at AM

## 2023-04-16 LAB
ANION GAP SERPL CALCULATED.3IONS-SCNC: 11 MMOL/L (ref 7–15)
ANION GAP SERPL CALCULATED.3IONS-SCNC: 12 MMOL/L (ref 7–15)
ANION GAP SERPL CALCULATED.3IONS-SCNC: 9 MMOL/L (ref 7–15)
BUN SERPL-MCNC: 20.1 MG/DL (ref 8–23)
BUN SERPL-MCNC: 21.2 MG/DL (ref 8–23)
BUN SERPL-MCNC: 22.8 MG/DL (ref 8–23)
CALCIUM SERPL-MCNC: 8.8 MG/DL (ref 8.8–10.2)
CALCIUM SERPL-MCNC: 9.1 MG/DL (ref 8.8–10.2)
CALCIUM SERPL-MCNC: 9.1 MG/DL (ref 8.8–10.2)
CHLORIDE SERPL-SCNC: 87 MMOL/L (ref 98–107)
CHLORIDE SERPL-SCNC: 91 MMOL/L (ref 98–107)
CHLORIDE SERPL-SCNC: 92 MMOL/L (ref 98–107)
CORTIS SERPL-MCNC: 17.5 UG/DL
CREAT SERPL-MCNC: 0.84 MG/DL (ref 0.67–1.17)
CREAT SERPL-MCNC: 0.87 MG/DL (ref 0.67–1.17)
CREAT SERPL-MCNC: 0.89 MG/DL (ref 0.67–1.17)
DEPRECATED HCO3 PLAS-SCNC: 21 MMOL/L (ref 22–29)
DEPRECATED HCO3 PLAS-SCNC: 21 MMOL/L (ref 22–29)
DEPRECATED HCO3 PLAS-SCNC: 22 MMOL/L (ref 22–29)
GFR SERPL CREATININE-BSD FRML MDRD: 89 ML/MIN/1.73M2
GFR SERPL CREATININE-BSD FRML MDRD: 89 ML/MIN/1.73M2
GFR SERPL CREATININE-BSD FRML MDRD: 90 ML/MIN/1.73M2
GLUCOSE SERPL-MCNC: 117 MG/DL (ref 70–99)
GLUCOSE SERPL-MCNC: 121 MG/DL (ref 70–99)
GLUCOSE SERPL-MCNC: 97 MG/DL (ref 70–99)
HOLD SPECIMEN: NORMAL
L PNEUMO1 AG UR QL IA: NEGATIVE
OSMOLALITY SERPL: 256 MMOL/KG (ref 280–301)
OSMOLALITY SERPL: 257 MMOL/KG (ref 280–301)
OSMOLALITY UR: 547 MMOL/KG (ref 100–1200)
POTASSIUM SERPL-SCNC: 5.1 MMOL/L (ref 3.4–5.3)
POTASSIUM SERPL-SCNC: 5.2 MMOL/L (ref 3.4–5.3)
POTASSIUM SERPL-SCNC: 5.2 MMOL/L (ref 3.4–5.3)
SODIUM SERPL-SCNC: 120 MMOL/L (ref 136–145)
SODIUM SERPL-SCNC: 122 MMOL/L (ref 136–145)
SODIUM SERPL-SCNC: 124 MMOL/L (ref 136–145)

## 2023-04-16 PROCEDURE — 250N000012 HC RX MED GY IP 250 OP 636 PS 637: Performed by: STUDENT IN AN ORGANIZED HEALTH CARE EDUCATION/TRAINING PROGRAM

## 2023-04-16 PROCEDURE — 82310 ASSAY OF CALCIUM: CPT | Performed by: STUDENT IN AN ORGANIZED HEALTH CARE EDUCATION/TRAINING PROGRAM

## 2023-04-16 PROCEDURE — 250N000009 HC RX 250: Performed by: STUDENT IN AN ORGANIZED HEALTH CARE EDUCATION/TRAINING PROGRAM

## 2023-04-16 PROCEDURE — 250N000013 HC RX MED GY IP 250 OP 250 PS 637: Performed by: STUDENT IN AN ORGANIZED HEALTH CARE EDUCATION/TRAINING PROGRAM

## 2023-04-16 PROCEDURE — 250N000011 HC RX IP 250 OP 636: Performed by: STUDENT IN AN ORGANIZED HEALTH CARE EDUCATION/TRAINING PROGRAM

## 2023-04-16 PROCEDURE — 36415 COLL VENOUS BLD VENIPUNCTURE: CPT | Performed by: STUDENT IN AN ORGANIZED HEALTH CARE EDUCATION/TRAINING PROGRAM

## 2023-04-16 PROCEDURE — 94640 AIRWAY INHALATION TREATMENT: CPT | Mod: 76

## 2023-04-16 PROCEDURE — 99232 SBSQ HOSP IP/OBS MODERATE 35: CPT | Performed by: STUDENT IN AN ORGANIZED HEALTH CARE EDUCATION/TRAINING PROGRAM

## 2023-04-16 PROCEDURE — 80048 BASIC METABOLIC PNL TOTAL CA: CPT | Performed by: STUDENT IN AN ORGANIZED HEALTH CARE EDUCATION/TRAINING PROGRAM

## 2023-04-16 PROCEDURE — 120N000001 HC R&B MED SURG/OB

## 2023-04-16 PROCEDURE — 999N000157 HC STATISTIC RCP TIME EA 10 MIN

## 2023-04-16 PROCEDURE — 94640 AIRWAY INHALATION TREATMENT: CPT

## 2023-04-16 RX ADMIN — ASPIRIN 81 MG: 81 TABLET, COATED ORAL at 10:00

## 2023-04-16 RX ADMIN — TAMSULOSIN HYDROCHLORIDE 0.8 MG: 0.4 CAPSULE ORAL at 09:59

## 2023-04-16 RX ADMIN — AMLODIPINE BESYLATE 2.5 MG: 2.5 TABLET ORAL at 09:59

## 2023-04-16 RX ADMIN — LEVOFLOXACIN 750 MG: 5 INJECTION, SOLUTION INTRAVENOUS at 09:59

## 2023-04-16 RX ADMIN — CALCIUM CARBONATE (ANTACID) CHEW TAB 500 MG 1000 MG: 500 CHEW TAB at 00:06

## 2023-04-16 RX ADMIN — UMECLIDINIUM 1 PUFF: 62.5 AEROSOL, POWDER ORAL at 06:42

## 2023-04-16 RX ADMIN — IPRATROPIUM BROMIDE AND ALBUTEROL SULFATE 3 ML: .5; 2.5 SOLUTION RESPIRATORY (INHALATION) at 06:41

## 2023-04-16 RX ADMIN — Medication 400 MG: at 10:01

## 2023-04-16 RX ADMIN — ENOXAPARIN SODIUM 40 MG: 40 INJECTION SUBCUTANEOUS at 21:56

## 2023-04-16 RX ADMIN — FLUTICASONE FUROATE AND VILANTEROL TRIFENATATE 1 PUFF: 100; 25 POWDER RESPIRATORY (INHALATION) at 06:42

## 2023-04-16 RX ADMIN — PREDNISONE 40 MG: 20 TABLET ORAL at 09:59

## 2023-04-16 ASSESSMENT — ACTIVITIES OF DAILY LIVING (ADL)
ADLS_ACUITY_SCORE: 24

## 2023-04-16 NOTE — PROGRESS NOTES
Pt remains on 5L nc (4L chronic), has not req'd any PRN neb tx's and rec'd his daily MDI's as scheduled  No other interventions this shift    Cora Rob, RT on 4/16/2023 at 5:50 PM

## 2023-04-16 NOTE — PROGRESS NOTES
Updated provider patient request nebulizer treatment, as patient uses this Q6 at home. Order neds PRN Q4.    Ayah Mcgovern RN on 4/15/2023 at 9:54 PM

## 2023-04-16 NOTE — PROGRESS NOTES
[facility]    Hospitalist Progress Note      Assessment & Plan   Brent Villagomez is a 78 year old male who was admitted on 4/15/2023 with COPD and recent COVID-19 infection with worsening malaise, cough, dyspnea for the past 2 to 3 days and found to be hyponatremic to 117 in the setting of increased fluid intake and a 60 pack year smoking history. Negative imaging reassuring against malignancy or pneumonia. Patient's symptoms and hyponatremia have improved with initial 1 L bolus normal saline and subsequent water restriction.    Principal Problem:    Hyponatremia    Assessment: Patient found to be hyponatremic to 117 on admission for 2-3 days of worsening malaise.  In recovering from COVID-19 infection, he has increased his fluid intake to ~12 cups of water per day.  Still waiting on urine osmolarity and serum osmolarity, but correcting nicely with fluid restriction alone.  We will recheck BMP in the a.m.  TSH normal.  Likely discharge home tomorrow.    Plan:   -Follow up Na 4/17 AM. If continues to appropriately correct (<8 mmol/L), patient can be discharged 4/17.   -Imaging reassuring against malignancy or pneumonia.        Active Problems:    COPD exacerbation (H)    Stage 3 severe COPD by GOLD classification emphysema (H)    Assessment: Increased shortness of breath and dyspnea over the past 2 to 3 days found to be tachypneic with coarse lung sounds on exam raising concern for COPD exacerbation, potentially in the setting of brewing pneumonia.      Plan:   -Continue Levaquin 750 IV  -Continue DuoNeb as needed every 4 hours PRN  -Continue home Incruse Ellipta, Breo Ellipta inhalers  -Continue prednisone 40 mg p.o. daily  -Chest CT reassuring against malignancy/PNA      Recent COVID-19 Infection  Assessment: Patient is out of the infectious window and does not need isolation precautions.           Benign prostatic hyperplasia with lower urinary tract symptoms, symptom details unspecified     "Assessment: No acute concern.  Continue home Flomax    Plan:   -Continue Flomax 0.8 mg p.o. daily          Major depressive disorder, recurrent episode, moderate (H)    Assessment: Minimal concern for major depressive disorder contributing to acute presentation.  Patient denies ever taking Prozac.      Clinically Significant Risk Factors Present on Admission         # Hyponatremia: Lowest Na = 117 mmol/L in last 2 days, will monitor as appropriate  # Hypocalcemia: Lowest Ca = 8.1 mg/dL in last 2 days, will monitor and replace as appropriate         # Hypertension: home medication list includes antihypertensive(s)      # Overweight: Estimated body mass index is 27.37 kg/m  as calculated from the following:    Height as of this encounter: 1.727 m (5' 8\").    Weight as of this encounter: 81.6 kg (180 lb).            Diet: Combination Diet Regular Diet Adult  Fluid restriction 2000 ML FLUID    DVT Prophylaxis: Enoxaparin (Lovenox) SQ  Burris Catheter: Not present  Code Status: Full Code           Disposition Plan    Likely discharge home on 4- as long as sodium rises appropriate    The patient's care was discussed with the Attending Physician, Dr. Proctor.    Sunil Balderrama, MS3  Hospitalist Service  [facility]  Contact information available via [facility] Paging/Directory    I was present with the medical student who participated in the service and in the documentation of the note. I have verified the history and personally performed the physical exam and medical decision making. I agree with the assessment and plan of care as documented in the note.    Prince Proctor MD on 4/16/2023 at 12:19 PM        ______________________________________________________________________    Interval History   CC: Malaise, wheezing, shortness of breath.   Patient endorses feeling less fatigued today.  He thinks his shortness of breath is improving and is back on home oxygen regimen of 4 to 4.5 L.  Patient is doing " well restricting fluid intake to less than 2 L/day, and drinking only when he feels thirsty.  Patient is still feeling short of breath but finding relief with inhaled steroids.    -Data reviewed today: I reviewed all new labs and imaging results over the last 24 hours. I personally reviewed no images or EKG's today.    Physical Exam   Temp: 97.9  F (36.6  C) Temp src: Tympanic BP: (!) 149/81 Pulse: 54   Resp: 26 SpO2: 92 % O2 Device: Nasal cannula Oxygen Delivery: 5 LPM  Vitals:    04/15/23 1021   Weight: 81.6 kg (180 lb)     Vital Signs with Ranges  Temp:  [97.5  F (36.4  C)-97.9  F (36.6  C)] 97.9  F (36.6  C)  Pulse:  [54-80] 54  Resp:  [22-26] 26  BP: (124-153)/(70-97) 149/81  SpO2:  [92 %-96 %] 92 %  No intake/output data recorded.    Constitutional: Alert, conversational, appropriate.  Respiratory: Coarse rhonchi and wheezing with expiration.  Cardiovascular: Normal S1-S2.  No murmurs rubs gallops.    GI: No abdominal pain with palpation.  Skin/Integumen: No changes.  Other:      Medications       amLODIPine  2.5 mg Oral Daily     aspirin  81 mg Oral Daily     enoxaparin ANTICOAGULANT  40 mg Subcutaneous Q24H     fluticasone-vilanterol  1 puff Inhalation Daily    And     umeclidinium  1 puff Inhalation Daily     levofloxacin  750 mg Intravenous Q24H     magnesium oxide  400 mg Oral Daily     predniSONE  40 mg Oral Daily     sodium chloride (PF)  3 mL Intracatheter Q8H     tamsulosin  0.8 mg Oral Daily       Data   Recent Labs   Lab 04/16/23  0908 04/16/23  0522 04/16/23  0102 04/15/23  1600 04/15/23  1210 04/15/23  1034   WBC  --   --   --   --   --  6.7   HGB  --   --   --   --   --  15.3   MCV  --   --   --   --   --  90   PLT  --   --   --   --   --  210   * 122* 120*   < > 119*  118* 117*   POTASSIUM 5.1 5.2 5.2   < > 4.8  4.8 5.2   CHLORIDE 92* 91* 87*   < > 86*  85* 86*   CO2 21* 22 21*   < > 21*  21* 19*   BUN 20.1 21.2 22.8   < > 23.4*  21.6 21.1   CR 0.84 0.87 0.89   < > 0.95  0.97 0.88    ANIONGAP 11 9 12   < > 12  12 12   MARCY 9.1 9.1 8.8   < > 8.3*  8.5* 8.7*   GLC 97 121* 117*   < > 102*  103* 104*   ALBUMIN  --   --   --   --  3.9  --    PROTTOTAL  --   --   --   --  6.4  --    BILITOTAL  --   --   --   --  0.6  --    ALKPHOS  --   --   --   --  89  --    ALT  --   --   --   --  29  --    AST  --   --   --   --  31  --     < > = values in this interval not displayed.       Recent Results (from the past 24 hour(s))   CT Chest w Contrast    Narrative    PROCEDURE:  CT CHEST W CONTRAST.      HISTORY:  Hyponatremia, recent COVID infection. Eval for lung mass or  PNA.      TECHNIQUE:  Contrast enhanced helical thoracic CT was performed. This  CT exam was performed using one or more the following dose reduction  techniques: automated exposure control, adjustment of the mA and/or kV  according to patient size, and/or iterative reconstruction technique.    COMPARISON:  4/15/2023, 8/12/2022    MEDS/CONTRAST: ISOVUE 370, 100 mL     FINDINGS: Respiratory motion mildly limits assessment.    The heart and mediastinum are unchanged in appearance.     The central airways are patent. There is no focal consolidation.  Advanced emphysematous changes are again seen. A 9 mm subpleural  nodule in the right lower lobe is chronic and unchanged.         Limited views of the upper abdomen reveal no adrenal mass or acute  process.     Multiple previously described left rib fractures are associated with  interval healing. No suspicious osseous lesions are seen.      Impression    IMPRESSION:    Advanced emphysema.    NAVDEEP OLSON MD         SYSTEM ID:  [id number]

## 2023-04-16 NOTE — PLAN OF CARE
"Goal Outcome Evaluation:    Patient alert and oriented. VSS. Afebrile. Patient on 5L O2 via NC, satting at 94%, dyspnea with exertion. LS inspiratory and expiratory wheezing. BS audible. Will continue to monitor.     Blood pressure (!) 153/82, pulse 80, temperature 97.5  F (36.4  C), temperature source Tympanic, resp. rate 24, height 1.727 m (5' 8\"), weight 81.6 kg (180 lb), SpO2 93 %.       Plan of Care Reviewed With: patient    Overall Patient Progress: improvingOverall Patient Progress: improving    Outcome Evaluation: Sodium levels are slowly increasing.      "

## 2023-04-17 VITALS
DIASTOLIC BLOOD PRESSURE: 63 MMHG | HEART RATE: 76 BPM | WEIGHT: 180 LBS | SYSTOLIC BLOOD PRESSURE: 136 MMHG | RESPIRATION RATE: 18 BRPM | BODY MASS INDEX: 27.28 KG/M2 | OXYGEN SATURATION: 96 % | HEIGHT: 68 IN | TEMPERATURE: 98.2 F

## 2023-04-17 LAB
ANION GAP SERPL CALCULATED.3IONS-SCNC: 9 MMOL/L (ref 7–15)
BUN SERPL-MCNC: 21.8 MG/DL (ref 8–23)
CALCIUM SERPL-MCNC: 9.4 MG/DL (ref 8.8–10.2)
CHLORIDE SERPL-SCNC: 96 MMOL/L (ref 98–107)
CREAT SERPL-MCNC: 0.95 MG/DL (ref 0.67–1.17)
DEPRECATED HCO3 PLAS-SCNC: 26 MMOL/L (ref 22–29)
GFR SERPL CREATININE-BSD FRML MDRD: 83 ML/MIN/1.73M2
GLUCOSE SERPL-MCNC: 122 MG/DL (ref 70–99)
HOLD SPECIMEN: NORMAL
POTASSIUM SERPL-SCNC: 4.7 MMOL/L (ref 3.4–5.3)
SODIUM SERPL-SCNC: 131 MMOL/L (ref 136–145)

## 2023-04-17 PROCEDURE — 94640 AIRWAY INHALATION TREATMENT: CPT

## 2023-04-17 PROCEDURE — 36415 COLL VENOUS BLD VENIPUNCTURE: CPT | Performed by: STUDENT IN AN ORGANIZED HEALTH CARE EDUCATION/TRAINING PROGRAM

## 2023-04-17 PROCEDURE — 250N000013 HC RX MED GY IP 250 OP 250 PS 637: Performed by: STUDENT IN AN ORGANIZED HEALTH CARE EDUCATION/TRAINING PROGRAM

## 2023-04-17 PROCEDURE — 250N000012 HC RX MED GY IP 250 OP 636 PS 637: Performed by: STUDENT IN AN ORGANIZED HEALTH CARE EDUCATION/TRAINING PROGRAM

## 2023-04-17 PROCEDURE — 999N000157 HC STATISTIC RCP TIME EA 10 MIN

## 2023-04-17 PROCEDURE — 80048 BASIC METABOLIC PNL TOTAL CA: CPT | Performed by: STUDENT IN AN ORGANIZED HEALTH CARE EDUCATION/TRAINING PROGRAM

## 2023-04-17 PROCEDURE — 99239 HOSP IP/OBS DSCHRG MGMT >30: CPT | Performed by: STUDENT IN AN ORGANIZED HEALTH CARE EDUCATION/TRAINING PROGRAM

## 2023-04-17 RX ORDER — LEVOFLOXACIN 750 MG/1
750 TABLET, FILM COATED ORAL DAILY
Qty: 3 TABLET | Refills: 0 | Status: SHIPPED | OUTPATIENT
Start: 2023-04-17 | End: 2023-04-24

## 2023-04-17 RX ORDER — PREDNISONE 20 MG/1
40 TABLET ORAL DAILY
Qty: 6 TABLET | Refills: 0 | Status: SHIPPED | OUTPATIENT
Start: 2023-04-17 | End: 2023-04-24

## 2023-04-17 RX ADMIN — AMLODIPINE BESYLATE 2.5 MG: 2.5 TABLET ORAL at 09:20

## 2023-04-17 RX ADMIN — UMECLIDINIUM 1 PUFF: 62.5 AEROSOL, POWDER ORAL at 07:17

## 2023-04-17 RX ADMIN — Medication 400 MG: at 09:20

## 2023-04-17 RX ADMIN — FLUTICASONE FUROATE AND VILANTEROL TRIFENATATE 1 PUFF: 100; 25 POWDER RESPIRATORY (INHALATION) at 07:16

## 2023-04-17 RX ADMIN — PREDNISONE 40 MG: 20 TABLET ORAL at 09:19

## 2023-04-17 RX ADMIN — ASPIRIN 81 MG: 81 TABLET, COATED ORAL at 09:20

## 2023-04-17 RX ADMIN — TAMSULOSIN HYDROCHLORIDE 0.8 MG: 0.4 CAPSULE ORAL at 09:19

## 2023-04-17 ASSESSMENT — ACTIVITIES OF DAILY LIVING (ADL)
ADLS_ACUITY_SCORE: 24

## 2023-04-17 NOTE — PLAN OF CARE
"Goal Outcome Evaluation:    Patient alert and oriented. Denies pain. VSS. Patient on chronic 4L, currently on 4L, satting at 95%.  Up ad beti. LS course inspiratory and expiratory wheezing. Intermittent cough, patient reported coughing up some phlegm. Sodium levels increasing. Will continue to monitor.   Blood pressure 129/60, pulse 84, temperature 98.3  F (36.8  C), temperature source Tympanic, resp. rate 24, height 1.727 m (5' 8\"), weight 81.6 kg (180 lb), SpO2 94 %.       Plan of Care Reviewed With: patient    Overall Patient Progress: improvingOverall Patient Progress: improving    Outcome Evaluation: Sodium 124    Ayah Mcgovern RN on 4/17/2023 at 1:15 AM  "

## 2023-04-17 NOTE — PROGRESS NOTES
[facility] DISCHARGE NOTE    Patient discharged to home at 9:30 AM via wheel chair. Accompanied by spouse and staff. Discharge instructions reviewed with patient, opportunity offered to ask questions. Prescriptions sent to patients preferred pharmacy. All belongings sent with patient.    Jessica Welch RN

## 2023-04-17 NOTE — PROGRESS NOTES
Patient continues on 5lpm via nasal cannula.  No PRN neb treatments given.  Scheduled inhalers remain.  No further respiratory interventions.

## 2023-04-17 NOTE — PHARMACY - DISCHARGE MEDICATION RECONCILIATION AND EDUCATION
Pharmacy:  Discharge Counseling and Medication Reconciliation    Brent Villagomez  [address] [facility] [place]  [place] [id number] [phone] (home)   78 year old male  PCP: Prince Proctor    Allergies: No clinical screening - see comments    Discharge Counseling:    Pharmacist met with patient (and/or family) today to review the medication portion of the After Visit Summary (with an emphasis on NEW medications) and to address patient's questions/concerns.    Summary of Education: met with patient to discuss administration, duration and side effects of new medications.  All questions answered.    Materials Provided:  MedCounselor sheets printed from Clinical Pharmacology on: levaquin, prednisone    Discharge Medication Reconciliation:    It has been determined that the patient has an adequate supply of medications available or which can be obtained from the patient's preferred pharmacy, which HE/SHE has confirmed as: [facility] [An updated medication list will be faxed to the patient's pharmacy.]    Thank you for the consult.    Kemi Torres RPH........April 17, 2023 8:23 AM

## 2023-04-17 NOTE — DISCHARGE SUMMARY
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 Negative imaging reassuring against malignancy or pneumonia. Patient's symptoms and hyponatremia have improved with initial 1 L bolus normal saline and subsequent water restriction and cause of hyponatremia determined to be excessive fluid intake after covid 19 infection. Limiting his fluid intake to less than 2 L per day improved his serum sodium to normal over 48 hours.     Patient also treated for COPD exacerbation with wheezing. Suspect trigger was recent COVID 19 infection. CT chest performed and negative for malignancy and pneumonia.     Patient will be discharged home on an approximate 2L fluid restriction as well as a course of levofloxacin and prednisone 40mg for a 7 day total course.         Hospital Course   Brent Villagomez was admitted on 4/15/2023.  The following problems were addressed during his hospitalization:      Hyponatremia    Assessment: Patient found to be hyponatremic to 117 on admission  for 2-3 days of worsening malaise.  In recovering from COVID-19 infection, he has increased his fluid intake to ~12 cups of water per day.   Hyponatremia corrected with fluid restriction alone.   Advised to drink when he is thirsty similar to what he is doing in the hospital at discharge.  Otherwise recommend less than 2 L fluid restriction    Plan:   -CT chest reassuring against malignancy or pneumonia.        Active Problems:    COPD exacerbation (H)    Stage 3 severe COPD by GOLD classification emphysema (H)    Assessment: Increased shortness of breath and dyspnea over the past 2 to 3 days found to be tachypneic with coarse lung sounds on exam raising concern for COPD exacerbation, also could be secondary to increase fluid intake and overall fluid overload    Plan:   -Discharged with an additional 3 days of levofloxacin and prednisone 40 mg daily  -Continue home inhalers     Recent COVID-19 Infection  Assessment: Patient is out of the infectious window and does not need isolation precautions.           Benign prostatic hyperplasia with lower urinary tract symptoms, symptom details unspecified    Assessment: No acute concern.  Continue home Flomax    Plan:   -Continue Flomax 0.8 mg p.o. daily          Major depressive disorder, recurrent episode, moderate (H)    Assessment: Minimal concern for major depressive disorder contributing to acute presentation.  Patient denies ever taking Prozac.    Prince Proctor MD    Significant Results and Procedures       Pending Results   These results will be followed up by NA  Unresulted Labs Ordered in the Past 30 Days of this Admission     No orders found from 3/16/2023 to 4/16/2023.          Code Status   Full Code       Primary Care Physician   Prince Proctor    Physical Exam   Temp: 98.4  F (36.9  C) Temp src: Tympanic BP: 118/64 Pulse: 84   Resp: 24 SpO2: 95 % O2 Device: Nasal cannula Oxygen Delivery: 4 LPM  Vitals:    04/15/23 1021   Weight: 81.6 kg (180 lb)     Vital Signs  with Ranges  Temp:  [97.9  F (36.6  C)-98.5  F (36.9  C)] 98.4  F (36.9  C)  Pulse:  [54-84] 84  Resp:  [24-26] 24  BP: (118-149)/(60-81) 118/64  SpO2:  [92 %-95 %] 95 %  I/O last 3 completed shifts:  In: 700 [P.O.:700]  Out: 3900 [Urine:3900]    Constitutional: Pleasant 78-year-old man lying in bed no acute distress  Eyes: Anicteric  ENT: Within normal limit  Respiratory: Scattered end expiratory wheezing throughout all lung fields, on 4 L supplemental oxygen  Cardiovascular: Regular rate and rhythm no peripheral edema appreciated  GI: Soft nontender abdomen    Discharge Disposition   Discharged to home  Condition at discharge: Stable    Consultations This Hospital Stay   None    Time Spent on this Encounter   IPrince MD, personally saw the patient today and spent greater than 30 minutes discharging this patient.    Discharge Orders   No discharge procedures on file.  Discharge Medications   Current Discharge Medication List      CONTINUE these medications which have NOT CHANGED    Details   albuterol (PROAIR HFA/PROVENTIL HFA/VENTOLIN HFA) 108 (90 Base) MCG/ACT inhaler Inhale 2 puffs into the lungs every 6 hours as needed for shortness of breath / dyspnea  Qty: 8.5 g, Refills: 11    Comments: Pharmacy may dispense brand covered by insurance (Proair, or proventil or ventolin or generic albuterol inhaler)  Associated Diagnoses: Stage 3 severe COPD by GOLD classification (H); Pulmonary emphysema, unspecified emphysema type (H)      amLODIPine (NORVASC) 5 MG tablet Take 2.5 mg by mouth daily       aspirin EC 81 MG EC tablet Take 81 mg by mouth daily       Fluticasone-Umeclidin-Vilanterol (TRELEGY ELLIPTA) 100-62.5-25 MCG/INH oral inhaler Inhale 1 puff into the lungs daily  Qty: 60 each, Refills: 3    Associated Diagnoses: Stage 3 severe COPD by GOLD classification (H)      ipratropium - albuterol 0.5 mg/2.5 mg/3 mL (DUONEB) 0.5-2.5 (3) MG/3ML neb solution Take 3 mL by nebulization every six hours as needed  for shortness of breath / dyspnea or wheezing. Strength: 0.5-2.5 (3) MG/3ML  Qty: 360 mL, Refills: 8    Comments: Place on file.  Associated Diagnoses: COPD exacerbation (H)      lisinopril-hydrochlorothiazide (ZESTORETIC) 20-12.5 MG tablet Take 1 tablet by mouth daily  Qty: 90 tablet, Refills: 3    Associated Diagnoses: Hypertension, unspecified type      loratadine (CLARITIN) 10 MG tablet Take 10 mg by mouth 2 times daily       LORazepam (ATIVAN) 1 MG tablet Take 1 tablet (1 mg) by mouth 3 times daily as needed for anxiety  Qty: 90 tablet, Refills: 1    Associated Diagnoses: Anxiety      magnesium oxide (MAG-OX) 400 MG tablet Take 1 tablet (400 mg) by mouth daily  Qty: 90 tablet, Refills: 3    Associated Diagnoses: Hypomagnesemia      multivitamin, therapeutic (THERA-VIT) TABS tablet Take 1 tablet by mouth daily      tamsulosin (FLOMAX) 0.4 MG capsule Take 2 capsules (0.8 mg) by mouth daily  Qty: 180 capsule, Refills: 3    Associated Diagnoses: Benign prostatic hyperplasia with lower urinary tract symptoms, symptom details unspecified      Respiratory Therapy Supplies (INNOSPIRE REPLACEMENT FILTER) MISC       Respiratory Therapy Supplies (NEBULIZER/TUBING/MOUTHPIECE) KIT Use when taking nebs           Allergies   Allergies   Allergen Reactions     No Clinical Screening - See Comments      Dust and pollen     Data   Most Recent 3 CBC's:  Recent Labs   Lab Test 04/15/23  1034 01/16/23  2020 08/24/22  1134   WBC 6.7 8.7 10.9   HGB 15.3 15.7 16.2   MCV 90 97 99    183 245      Most Recent 3 BMP's:  Recent Labs   Lab Test 04/17/23  0610 04/16/23  0908 04/16/23  0522   * 124* 122*   POTASSIUM 4.7 5.1 5.2   CHLORIDE 96* 92* 91*   CO2 26 21* 22   BUN 21.8 20.1 21.2   CR 0.95 0.84 0.87   ANIONGAP 9 11 9   MARCY 9.4 9.1 9.1   * 97 121*     Most Recent 2 LFT's:  Recent Labs   Lab Test 04/15/23  1210 08/12/22  1443   AST 31 22   ALT 29 21   ALKPHOS 89 66   BILITOTAL 0.6 0.9     Most Recent INR's and  Anticoagulation Dosing History:  Anticoagulation Dose History         Latest Ref Rng & Units 8/12/2022   Recent Dosing and Labs   INR 0.85 - 1.15 1.02                Most Recent 3 Troponin's:No lab results found.  Most Recent Cholesterol Panel:No lab results found.  Most Recent 6 Bacteria Isolates From Any Culture (See EPIC Reports for Culture Details):No lab results found.  Most Recent TSH, T4 and A1c Labs:  Recent Labs   Lab Test 04/15/23  1028   TSH 2.71     Results for orders placed or performed during the hospital encounter of 04/15/23   XR Chest 2 Views    Narrative    PROCEDURE:  XR CHEST 2 VIEWS    HISTORY: Cough.    COMPARISON:  1/16/2023    FINDINGS:  The cardiomediastinal contours are stable. The trachea is midline.  There is calcific aortic atherosclerosis.  Advanced emphysema. Chronic bibasilar scarring. Improved aeration of  the left lung base. No new discrete infiltrate. The diaphragms are  flattened.  Osteopenia or osteoporosis. No subdiaphragmatic free air.      Impression    IMPRESSION:      Chronic emphysema and scarring.    NAVDEEP OLSON MD         SYSTEM ID:  [id number]   CT Chest w Contrast    Narrative    PROCEDURE:  CT CHEST W CONTRAST.      HISTORY:  Hyponatremia, recent COVID infection. Eval for lung mass or  PNA.      TECHNIQUE:  Contrast enhanced helical thoracic CT was performed. This  CT exam was performed using one or more the following dose reduction  techniques: automated exposure control, adjustment of the mA and/or kV  according to patient size, and/or iterative reconstruction technique.    COMPARISON:  4/15/2023, 8/12/2022    MEDS/CONTRAST: ISOVUE 370, 100 mL     FINDINGS: Respiratory motion mildly limits assessment.    The heart and mediastinum are unchanged in appearance.     The central airways are patent. There is no focal consolidation.  Advanced emphysematous changes are again seen. A 9 mm subpleural  nodule in the right lower lobe is chronic and unchanged.          Limited views of the upper abdomen reveal no adrenal mass or acute  process.     Multiple previously described left rib fractures are associated with  interval healing. No suspicious osseous lesions are seen.      Impression    IMPRESSION:    Advanced emphysema.    NAVDEEP OLSON MD         SYSTEM ID:  [id number]

## 2023-04-18 ENCOUNTER — PATIENT OUTREACH (OUTPATIENT)
Dept: INTERNAL MEDICINE | Facility: OTHER | Age: 78
End: 2023-04-18
Payer: COMMERCIAL

## 2023-04-18 NOTE — TELEPHONE ENCOUNTER
Transitional Care Management Phone Call    Summary of hospitalization:  [facility] discharge summary reviewed     Diagnostic Tests/Treatments reviewed.  Follow up needed: 4/24/23 Hitesh    Post Discharge Medication Reconciliation: discharge medications reconciled, continue medications without change.  Medications reviewed by: by myself    Problems taking medications regularly:  None  Problems adhering to non-medication therapy:  None    Other Healthcare Providers Involved in Patient s Care:         None  Update since discharge: improved.   Plan of care communicated with patient    Was the patient contacted within the 2 business days or other approved timeframe?  Yes  Was the Medication reconciliation and management done since the patient was discharged? No    Deepti Stallworth RN  4/18/2023 3:28 PM

## 2023-04-18 NOTE — TELEPHONE ENCOUNTER
Message left for patient to return call to the Unit 3 RN. Deepti Stallworth RN on 4/18/2023 at 8:07 AM

## 2023-04-24 ENCOUNTER — MYC MEDICAL ADVICE (OUTPATIENT)
Dept: INTERNAL MEDICINE | Facility: OTHER | Age: 78
End: 2023-04-24

## 2023-04-24 ENCOUNTER — OFFICE VISIT (OUTPATIENT)
Dept: INTERNAL MEDICINE | Facility: OTHER | Age: 78
End: 2023-04-24
Attending: STUDENT IN AN ORGANIZED HEALTH CARE EDUCATION/TRAINING PROGRAM
Payer: COMMERCIAL

## 2023-04-24 VITALS
TEMPERATURE: 98.5 F | SYSTOLIC BLOOD PRESSURE: 138 MMHG | HEART RATE: 68 BPM | OXYGEN SATURATION: 96 % | WEIGHT: 198.2 LBS | RESPIRATION RATE: 20 BRPM | DIASTOLIC BLOOD PRESSURE: 76 MMHG | BODY MASS INDEX: 30.14 KG/M2

## 2023-04-24 DIAGNOSIS — J96.21 ACUTE ON CHRONIC RESPIRATORY FAILURE WITH HYPOXIA (H): ICD-10-CM

## 2023-04-24 DIAGNOSIS — Z09 HOSPITAL DISCHARGE FOLLOW-UP: Primary | ICD-10-CM

## 2023-04-24 DIAGNOSIS — L57.0 ACTINIC KERATOSIS: ICD-10-CM

## 2023-04-24 DIAGNOSIS — F33.1 MAJOR DEPRESSIVE DISORDER, RECURRENT EPISODE, MODERATE (H): ICD-10-CM

## 2023-04-24 DIAGNOSIS — J44.1 COPD EXACERBATION (H): ICD-10-CM

## 2023-04-24 DIAGNOSIS — E87.1 HYPONATREMIA: ICD-10-CM

## 2023-04-24 DIAGNOSIS — E80.20 DISORDER OF PORPHYRIN METABOLISM (H): ICD-10-CM

## 2023-04-24 DIAGNOSIS — F10.21 ALCOHOL DEPENDENCE IN REMISSION (H): ICD-10-CM

## 2023-04-24 LAB
ANION GAP SERPL CALCULATED.3IONS-SCNC: 8 MMOL/L (ref 7–15)
BUN SERPL-MCNC: 26.7 MG/DL (ref 8–23)
CALCIUM SERPL-MCNC: 8.8 MG/DL (ref 8.8–10.2)
CHLORIDE SERPL-SCNC: 99 MMOL/L (ref 98–107)
CREAT SERPL-MCNC: 0.96 MG/DL (ref 0.67–1.17)
DEPRECATED HCO3 PLAS-SCNC: 27 MMOL/L (ref 22–29)
GFR SERPL CREATININE-BSD FRML MDRD: 82 ML/MIN/1.73M2
GLUCOSE SERPL-MCNC: 112 MG/DL (ref 70–99)
MAGNESIUM SERPL-MCNC: 2.3 MG/DL (ref 1.7–2.3)
POTASSIUM SERPL-SCNC: 4.9 MMOL/L (ref 3.4–5.3)
SODIUM SERPL-SCNC: 134 MMOL/L (ref 136–145)

## 2023-04-24 PROCEDURE — 80048 BASIC METABOLIC PNL TOTAL CA: CPT | Mod: ZL | Performed by: STUDENT IN AN ORGANIZED HEALTH CARE EDUCATION/TRAINING PROGRAM

## 2023-04-24 PROCEDURE — 17003 DESTRUCT PREMALG LES 2-14: CPT | Performed by: STUDENT IN AN ORGANIZED HEALTH CARE EDUCATION/TRAINING PROGRAM

## 2023-04-24 PROCEDURE — 99496 TRANSJ CARE MGMT HIGH F2F 7D: CPT | Mod: 25 | Performed by: STUDENT IN AN ORGANIZED HEALTH CARE EDUCATION/TRAINING PROGRAM

## 2023-04-24 PROCEDURE — 36415 COLL VENOUS BLD VENIPUNCTURE: CPT | Mod: ZL | Performed by: STUDENT IN AN ORGANIZED HEALTH CARE EDUCATION/TRAINING PROGRAM

## 2023-04-24 PROCEDURE — 83735 ASSAY OF MAGNESIUM: CPT | Mod: ZL | Performed by: STUDENT IN AN ORGANIZED HEALTH CARE EDUCATION/TRAINING PROGRAM

## 2023-04-24 PROCEDURE — G0463 HOSPITAL OUTPT CLINIC VISIT: HCPCS

## 2023-04-24 PROCEDURE — 17000 DESTRUCT PREMALG LESION: CPT | Performed by: STUDENT IN AN ORGANIZED HEALTH CARE EDUCATION/TRAINING PROGRAM

## 2023-04-24 ASSESSMENT — PATIENT HEALTH QUESTIONNAIRE - PHQ9
SUM OF ALL RESPONSES TO PHQ QUESTIONS 1-9: 2
10. IF YOU CHECKED OFF ANY PROBLEMS, HOW DIFFICULT HAVE THESE PROBLEMS MADE IT FOR YOU TO DO YOUR WORK, TAKE CARE OF THINGS AT HOME, OR GET ALONG WITH OTHER PEOPLE: NOT DIFFICULT AT ALL
SUM OF ALL RESPONSES TO PHQ QUESTIONS 1-9: 2

## 2023-04-24 ASSESSMENT — PAIN SCALES - GENERAL: PAINLEVEL: NO PAIN (0)

## 2023-04-24 NOTE — LETTER
April 24, 2023      Brent Villagomez  [address] [address] [address]        Dear ,    We are writing to inform you of your test results.    Your laboratory results are back to your baseline.  Your sodium has essentially gone back to normal.  Your magnesium and potassium are normal.  You do not have to push the fruits and vegetables at this time.  Continue a normal healthy diet and drink when you are thirsty.  We will see you sometime this fall for a follow-up, sooner if needed.    Resulted Orders   Basic Metabolic Panel   Result Value Ref Range    Sodium 134 (L) 136 - 145 mmol/L    Potassium 4.9 3.4 - 5.3 mmol/L    Chloride 99 98 - 107 mmol/L    Carbon Dioxide (CO2) 27 22 - 29 mmol/L    Anion Gap 8 7 - 15 mmol/L    Urea Nitrogen 26.7 (H) 8.0 - 23.0 mg/dL    Creatinine 0.96 0.67 - 1.17 mg/dL    Calcium 8.8 8.8 - 10.2 mg/dL    Glucose 112 (H) 70 - 99 mg/dL    GFR Estimate 82 >60 mL/min/1.73m2      Comment:      eGFR calculated using 2021 CKD-EPI equation.   Magnesium   Result Value Ref Range    Magnesium 2.3 1.7 - 2.3 mg/dL       If you have any questions or concerns, please call the clinic at the number listed above.       Sincerely,      Prince Proctor MD

## 2023-04-24 NOTE — PROGRESS NOTES
Assessment & Plan       Assessment & Plan         ICD-10-CM    1. Hospital discharge follow-up  Z09 Magnesium     Magnesium      2. Hyponatremia  E87.1 Basic Metabolic Panel     Magnesium     Basic Metabolic Panel     Magnesium      3. COPD exacerbation (H)  J44.1 Magnesium     Magnesium      4. Alcohol dependence in remission (H)  F10.21       5. Major depressive disorder, recurrent episode, moderate (H)  F33.1       6. 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 Actinic keratosis  L57.0 DESTRUCT PREMALIGNANT LESION, FIRST     DESTRUCT PREMALIGNANT LESION, 2-14        Discharge follow-up: Patient was hospitalized for hyponatremia secondary to excessive water intake after being diagnosed with COVID-19.  His breathing is at his baseline.  He has completed antibiotics and steroids.  Recheck sodium today as well as BMP and magnesium as he had muscle cramps initially on going home.  Offered physical therapy, but feels his strength will improve once he gets active outside again this summer.    Chronic respiratory failure with hypoxia: Remains on 3 L of supplemental oxygen had acute respiratory failure requiring increased oxygen needs in the hospitalization secondary to COPD exacerbation.  Back down to his 3 L of supplemental oxygen.    Actinic keratosis: Four actinic keratoses were frozen on the scalp as well as 3 on the left back of his hand using 3 rounds of cryotherapy with freeze thaw cycles in between.        Post Discharge Medication Reconciliation: discharge medications reconciled, continue medications without change.  Issues to address: No issues identified.  Plan of care communicated with patient and family    No follow-ups on file.    Prince Proctor MD  [facility]   Brent is a 78 year old, presenting for the following health issues:  Hospital F/U (4-15-23  to 4-17-23   [facility]  follow up   Hyponatriemia   )         View : No data to display.              History of Present Illness       Reason for visit:  Hospital follow up    He eats 0-1 servings of fruits and vegetables daily.He consumes 1 sweetened beverage(s) daily.He exercises with enough effort to increase his heart rate 9 or less minutes per day.  He exercises with enough effort to increase his heart rate 3 or less days per week.   He is taking medications regularly.    Today's PHQ-9         PHQ-9 Total Score: 2    PHQ-9 Q9 Thoughts of better off dead/self-harm past 2 weeks :   Not at all    How difficult have these problems made it for you to do your work, take care of things at home, or get along with other people: Not difficult at all         Hospital Follow-up Visit:    Hospital/Nursing Home/IP Rehab Facility: [facility]  Date of Admission: 4-15-23  Date of Discharge: 4-17-23  Reason(s) for Admission: hyponatrimeia    Was your hospitalization related to COVID-19? YES   How are you feeling today? Better  In the past 24 hours have you had shortness of breath when speaking, walking, or climbing stairs? My breathing issues have stayed the same  Do you have a cough? Yes, I have a cough but it's not worse  When is the last time you had a fever greater than 100? Not sure  Are you having any other symptoms? None   Do you have any other stressors you would like to discuss with your provider? No               Was the patient in the ICU or did the patient experience delirium during hospitalization?  No          Problems taking medications regularly:  None  Medication changes since discharge: None  Problems adhering to non-medication therapy:  None    Summary of hospitalization:  [facility] discharge summary reviewed  Diagnostic Tests/Treatments reviewed.  Follow up needed: none  Other Healthcare Providers Involved in Patient s Care:         None  Update since discharge: improved.         Plan of care communicated  with patient                   Review of Systems         Objective    /76 (BP Location: Right arm, Patient Position: Sitting, Cuff Size: Adult Regular)   Pulse 68   Temp 98.5  F (36.9  C) (Temporal)   Resp 20   Wt 89.9 kg (198 lb 3.2 oz)   SpO2 96%   BMI 30.14 kg/m    Body mass index is 30.14 kg/m .  Physical Exam   General: Pleasant 78-year-old man sitting clinic no acute distress accompanied by his wife.  Pulmonary: Unlabored breathing 3 L supplemental oxygen in place  Skin: 3 lesions on the dorsum of his left hand consistent with actinic keratoses with 1 being more advanced near the anatomic snuffbox.  As well as four actinic keratoses appreciated on his scalp.

## 2023-04-24 NOTE — NURSING NOTE
"Chief Complaint   Patient presents with     Hospital F/U     4-15-23  to 4-17-23   [facility]  follow up  Hyponatriemia          Medication reconciliation completed.    FOOD SECURITY SCREENING QUESTIONS:    The next two questions are to help us understand your food security.  If you are feeling you need any assistance in this area, we have resources available to support you today.    Hunger Vital Signs:  Within the past 12 months we worried whether our food would run out before we got money to buy more. Never  Within the past 12 months the food we bought just didn't last and we didn't have money to get more. Never    Initial /76 (BP Location: Right arm, Patient Position: Sitting, Cuff Size: Adult Regular)   Pulse 68   Temp 98.5  F (36.9  C) (Temporal)   Resp 20   Wt 89.9 kg (198 lb 3.2 oz)   SpO2 96%   BMI 30.14 kg/m   Estimated body mass index is 30.14 kg/m  as calculated from the following:    Height as of 4/15/23: 1.727 m (5' 8\").    Weight as of this encounter: 89.9 kg (198 lb 3.2 oz).       Nicole Esposito LPN .......  4/24/2023  9:19 AM  "

## 2023-08-24 DIAGNOSIS — J43.9 PULMONARY EMPHYSEMA, UNSPECIFIED EMPHYSEMA TYPE (H): Primary | ICD-10-CM

## 2023-12-09 ENCOUNTER — HEALTH MAINTENANCE LETTER (OUTPATIENT)
Age: 78
End: 2023-12-09

## 2024-04-16 ENCOUNTER — PATIENT OUTREACH (OUTPATIENT)
Dept: INTERNAL MEDICINE | Facility: OTHER | Age: 79
End: 2024-04-16
Payer: COMMERCIAL

## 2024-04-16 NOTE — LETTER
[facility]  [address]  [place] [id number] [phone]       April 16, 2024    Brent Villagomez  [address] [place] [address]    Dear Brent,    We care about your health and have reviewed your health plan and are making recommendations based on this review, to optimize your health.    You are in particular need of attention regarding:  -Wellness (Physical) Visit     We are recommending that you:  -schedule a WELLNESS (Physical) APPOINTMENT with me.   I will check fasting labs the same day - nothing to eat except water and meds for 8-10 hours prior. (If you go elsewhere for Wellness visits then please disregard this reminder.)    In addition, here is a list of due or overdue Health Maintenance reminders.    Health Maintenance Due   Topic Date Due    Discuss Advance Care Planning  Never done    Depression Action Plan  Never done    Hepatitis A Vaccine (1 of 2 - Risk 2-dose series) Never done    Cholesterol Lab  Never done    RSV VACCINE (Pregnancy & 60+) (1 - 1-dose 60+ series) Never done    COVID-19 Vaccine (6 - 2023-24 season) 09/01/2023    Depression Assessment  10/24/2023    Annual Wellness Visit  10/25/2023    LUNG CANCER SCREENING  04/15/2024    FALL RISK ASSESSMENT  04/24/2024       To address the above recommendations, we encourage you to contact us at [phone]. They will assist you with finding the most convenient time and location.    Thank you for trusting [facility] and we appreciate the opportunity to serve you.  We look forward to supporting your healthcare needs in the future.    Healthy Regards,    Your [facility] Team

## 2024-04-16 NOTE — TELEPHONE ENCOUNTER
Patient Quality Outreach    Patient is due for the following:   Physical Annual Wellness Visit    Next Steps:   Schedule a Annual Wellness Visit    Type of outreach:    Sent letter.      Questions for provider review:    None           Laura Fried

## 2025-01-11 ENCOUNTER — HEALTH MAINTENANCE LETTER (OUTPATIENT)
Age: 80
End: 2025-01-11

## (undated) RX ORDER — IPRATROPIUM BROMIDE AND ALBUTEROL SULFATE 2.5; .5 MG/3ML; MG/3ML
SOLUTION RESPIRATORY (INHALATION)
Status: DISPENSED
Start: 2023-01-16

## (undated) RX ORDER — TERBUTALINE SULFATE 1 MG/ML
INJECTION, SOLUTION SUBCUTANEOUS
Status: DISPENSED
Start: 2023-04-15

## (undated) RX ORDER — IPRATROPIUM BROMIDE AND ALBUTEROL SULFATE 2.5; .5 MG/3ML; MG/3ML
SOLUTION RESPIRATORY (INHALATION)
Status: DISPENSED
Start: 2022-08-19

## (undated) RX ORDER — CEFTRIAXONE SODIUM 2 G/50ML
INJECTION, SOLUTION INTRAVENOUS
Status: DISPENSED
Start: 2022-08-12

## (undated) RX ORDER — AZITHROMYCIN 500 MG/5ML
INJECTION, POWDER, LYOPHILIZED, FOR SOLUTION INTRAVENOUS
Status: DISPENSED
Start: 2021-09-12

## (undated) RX ORDER — IPRATROPIUM BROMIDE AND ALBUTEROL SULFATE 2.5; .5 MG/3ML; MG/3ML
SOLUTION RESPIRATORY (INHALATION)
Status: DISPENSED
Start: 2021-09-11

## (undated) RX ORDER — LEVOFLOXACIN 5 MG/ML
INJECTION, SOLUTION INTRAVENOUS
Status: DISPENSED
Start: 2023-04-15

## (undated) RX ORDER — METHYLPREDNISOLONE SODIUM SUCCINATE 125 MG/2ML
INJECTION, POWDER, LYOPHILIZED, FOR SOLUTION INTRAMUSCULAR; INTRAVENOUS
Status: DISPENSED
Start: 2022-08-19

## (undated) RX ORDER — SODIUM CHLORIDE 9 MG/ML
INJECTION, SOLUTION INTRAVENOUS
Status: DISPENSED
Start: 2021-09-11

## (undated) RX ORDER — IPRATROPIUM BROMIDE AND ALBUTEROL SULFATE 2.5; .5 MG/3ML; MG/3ML
SOLUTION RESPIRATORY (INHALATION)
Status: DISPENSED
Start: 2023-04-15

## (undated) RX ORDER — FENTANYL CITRATE 50 UG/ML
INJECTION, SOLUTION INTRAMUSCULAR; INTRAVENOUS
Status: DISPENSED
Start: 2022-08-12

## (undated) RX ORDER — KETOROLAC TROMETHAMINE 15 MG/ML
INJECTION, SOLUTION INTRAMUSCULAR; INTRAVENOUS
Status: DISPENSED
Start: 2023-04-15

## (undated) RX ORDER — IPRATROPIUM BROMIDE AND ALBUTEROL SULFATE 2.5; .5 MG/3ML; MG/3ML
SOLUTION RESPIRATORY (INHALATION)
Status: DISPENSED
Start: 2022-08-24

## (undated) RX ORDER — HYDROMORPHONE HYDROCHLORIDE 1 MG/ML
INJECTION, SOLUTION INTRAMUSCULAR; INTRAVENOUS; SUBCUTANEOUS
Status: DISPENSED
Start: 2023-04-15

## (undated) RX ORDER — CEFTRIAXONE SODIUM 1 G/50ML
INJECTION, SOLUTION INTRAVENOUS
Status: DISPENSED
Start: 2021-09-11

## (undated) RX ORDER — METHYLPREDNISOLONE SODIUM SUCCINATE 125 MG/2ML
INJECTION, POWDER, LYOPHILIZED, FOR SOLUTION INTRAMUSCULAR; INTRAVENOUS
Status: DISPENSED
Start: 2023-04-15

## (undated) RX ORDER — METHYLPREDNISOLONE SODIUM SUCCINATE 125 MG/2ML
INJECTION, POWDER, LYOPHILIZED, FOR SOLUTION INTRAMUSCULAR; INTRAVENOUS
Status: DISPENSED
Start: 2021-09-11